# Patient Record
Sex: FEMALE | Race: WHITE | NOT HISPANIC OR LATINO | Employment: FULL TIME | URBAN - METROPOLITAN AREA
[De-identification: names, ages, dates, MRNs, and addresses within clinical notes are randomized per-mention and may not be internally consistent; named-entity substitution may affect disease eponyms.]

---

## 2019-03-22 ENCOUNTER — HOSPITAL ENCOUNTER (INPATIENT)
Facility: HOSPITAL | Age: 39
LOS: 3 days | Discharge: HOME | DRG: 176 | End: 2019-03-25
Attending: EMERGENCY MEDICINE | Admitting: INTERNAL MEDICINE
Payer: COMMERCIAL

## 2019-03-22 DIAGNOSIS — I26.99 PULMONARY EMBOLISM: ICD-10-CM

## 2019-03-22 DIAGNOSIS — R06.02 SHORTNESS OF BREATH: ICD-10-CM

## 2019-03-22 DIAGNOSIS — Z75.8 DISCHARGE PLANNING ISSUES: ICD-10-CM

## 2019-03-22 DIAGNOSIS — I26.99 OTHER ACUTE PULMONARY EMBOLISM WITHOUT ACUTE COR PULMONALE: ICD-10-CM

## 2019-03-22 DIAGNOSIS — E11.9 NEW ONSET TYPE 2 DIABETES MELLITUS: ICD-10-CM

## 2019-03-22 DIAGNOSIS — I26.09 OTHER PULMONARY EMBOLISM WITH ACUTE COR PULMONALE, UNSPECIFIED CHRONICITY: ICD-10-CM

## 2019-03-22 DIAGNOSIS — I26.09 OTHER ACUTE PULMONARY EMBOLISM WITH ACUTE COR PULMONALE: Primary | ICD-10-CM

## 2019-03-22 DIAGNOSIS — R26.81 GAIT INSTABILITY: ICD-10-CM

## 2019-03-22 LAB
ALBUMIN SERPL BCP-MCNC: 3.5 G/DL
ALP SERPL-CCNC: 68 U/L
ALT SERPL W/O P-5'-P-CCNC: 18 U/L
ANION GAP SERPL CALC-SCNC: 10 MMOL/L
APTT BLDCRRT: 21.3 SEC
APTT BLDCRRT: 22.1 SEC
AST SERPL-CCNC: 14 U/L
B-HCG UR QL: NEGATIVE
BASOPHILS # BLD AUTO: 0.06 K/UL
BASOPHILS # BLD AUTO: 0.07 K/UL
BASOPHILS NFR BLD: 0.7 %
BASOPHILS NFR BLD: 0.9 %
BILIRUB SERPL-MCNC: 0.2 MG/DL
BNP SERPL-MCNC: <10 PG/ML
BUN SERPL-MCNC: 9 MG/DL
CALCIUM SERPL-MCNC: 10.4 MG/DL
CHLORIDE SERPL-SCNC: 97 MMOL/L
CO2 SERPL-SCNC: 26 MMOL/L
CREAT SERPL-MCNC: 1.2 MG/DL
CTP QC/QA: YES
D DIMER PPP IA.FEU-MCNC: 7.06 MG/L FEU
DIFFERENTIAL METHOD: ABNORMAL
DIFFERENTIAL METHOD: ABNORMAL
EOSINOPHIL # BLD AUTO: 0.1 K/UL
EOSINOPHIL # BLD AUTO: 0.1 K/UL
EOSINOPHIL NFR BLD: 0.7 %
EOSINOPHIL NFR BLD: 1 %
ERYTHROCYTE [DISTWIDTH] IN BLOOD BY AUTOMATED COUNT: 13.2 %
ERYTHROCYTE [DISTWIDTH] IN BLOOD BY AUTOMATED COUNT: 13.2 %
ERYTHROCYTE [DISTWIDTH] IN BLOOD BY AUTOMATED COUNT: 13.3 %
EST. GFR  (AFRICAN AMERICAN): >60 ML/MIN/1.73 M^2
EST. GFR  (NON AFRICAN AMERICAN): 57.9 ML/MIN/1.73 M^2
FIBRINOGEN PPP-MCNC: 193 MG/DL
GLUCOSE SERPL-MCNC: 407 MG/DL
HCT VFR BLD AUTO: 42 %
HCT VFR BLD AUTO: 45.5 %
HCT VFR BLD AUTO: 46.7 %
HGB BLD-MCNC: 13.6 G/DL
HGB BLD-MCNC: 14.1 G/DL
HGB BLD-MCNC: 14.6 G/DL
IMM GRANULOCYTES # BLD AUTO: 0.03 K/UL
IMM GRANULOCYTES # BLD AUTO: 0.05 K/UL
IMM GRANULOCYTES NFR BLD AUTO: 0.4 %
IMM GRANULOCYTES NFR BLD AUTO: 0.5 %
INR PPP: 0.9
INR PPP: 1
INR PPP: 1.5
LYMPHOCYTES # BLD AUTO: 2.4 K/UL
LYMPHOCYTES # BLD AUTO: 4.3 K/UL
LYMPHOCYTES NFR BLD: 34.4 %
LYMPHOCYTES NFR BLD: 40.6 %
MCH RBC QN AUTO: 26.1 PG
MCH RBC QN AUTO: 26.2 PG
MCH RBC QN AUTO: 26.8 PG
MCHC RBC AUTO-ENTMCNC: 31 G/DL
MCHC RBC AUTO-ENTMCNC: 31.3 G/DL
MCHC RBC AUTO-ENTMCNC: 32.4 G/DL
MCV RBC AUTO: 83 FL
MCV RBC AUTO: 84 FL
MCV RBC AUTO: 85 FL
MONOCYTES # BLD AUTO: 0.5 K/UL
MONOCYTES # BLD AUTO: 1 K/UL
MONOCYTES NFR BLD: 6.5 %
MONOCYTES NFR BLD: 9.1 %
NEUTROPHILS # BLD AUTO: 4 K/UL
NEUTROPHILS # BLD AUTO: 5.1 K/UL
NEUTROPHILS NFR BLD: 48.1 %
NEUTROPHILS NFR BLD: 57.1 %
NRBC BLD-RTO: 0 /100 WBC
NRBC BLD-RTO: 0 /100 WBC
PLATELET # BLD AUTO: 222 K/UL
PLATELET # BLD AUTO: 248 K/UL
PLATELET # BLD AUTO: 261 K/UL
PMV BLD AUTO: 10.4 FL
PMV BLD AUTO: 10.4 FL
PMV BLD AUTO: 10.9 FL
POCT GLUCOSE: 310 MG/DL (ref 70–110)
POTASSIUM SERPL-SCNC: 4.7 MMOL/L
PROT SERPL-MCNC: 8.2 G/DL
PROTHROMBIN TIME: 10.2 SEC
PROTHROMBIN TIME: 15.1 SEC
PROTHROMBIN TIME: 9.6 SEC
RBC # BLD AUTO: 5.08 M/UL
RBC # BLD AUTO: 5.38 M/UL
RBC # BLD AUTO: 5.59 M/UL
SODIUM SERPL-SCNC: 133 MMOL/L
TROPONIN I SERPL DL<=0.01 NG/ML-MCNC: 0.03 NG/ML
WBC # BLD AUTO: 10.5 K/UL
WBC # BLD AUTO: 6.9 K/UL
WBC # BLD AUTO: 7.03 K/UL

## 2019-03-22 PROCEDURE — 85027 COMPLETE CBC AUTOMATED: CPT

## 2019-03-22 PROCEDURE — 93010 EKG 12-LEAD: ICD-10-PCS | Mod: ,,, | Performed by: INTERNAL MEDICINE

## 2019-03-22 PROCEDURE — 99152 PR MOD CONSCIOUS SEDATION, SAME PHYS, 5+ YRS, FIRST 15 MIN: ICD-10-PCS | Mod: ,,, | Performed by: INTERNAL MEDICINE

## 2019-03-22 PROCEDURE — 99291 CRITICAL CARE FIRST HOUR: CPT | Mod: 25

## 2019-03-22 PROCEDURE — 93005 ELECTROCARDIOGRAM TRACING: CPT

## 2019-03-22 PROCEDURE — 36014 PLACE CATHETER IN ARTERY: CPT | Mod: 59,RT,, | Performed by: INTERNAL MEDICINE

## 2019-03-22 PROCEDURE — C1751 CATH, INF, PER/CENT/MIDLINE: HCPCS | Performed by: INTERNAL MEDICINE

## 2019-03-22 PROCEDURE — 25000003 PHARM REV CODE 250: Performed by: STUDENT IN AN ORGANIZED HEALTH CARE EDUCATION/TRAINING PROGRAM

## 2019-03-22 PROCEDURE — 96374 THER/PROPH/DIAG INJ IV PUSH: CPT

## 2019-03-22 PROCEDURE — 99291 PR CRITICAL CARE, E/M 30-74 MINUTES: ICD-10-PCS | Mod: ,,, | Performed by: EMERGENCY MEDICINE

## 2019-03-22 PROCEDURE — 81025 URINE PREGNANCY TEST: CPT | Performed by: EMERGENCY MEDICINE

## 2019-03-22 PROCEDURE — C1769 GUIDE WIRE: HCPCS | Performed by: INTERNAL MEDICINE

## 2019-03-22 PROCEDURE — 93010 EKG 12-LEAD: ICD-10-PCS | Mod: 77,,, | Performed by: INTERNAL MEDICINE

## 2019-03-22 PROCEDURE — 99152 MOD SED SAME PHYS/QHP 5/>YRS: CPT | Mod: ,,, | Performed by: INTERNAL MEDICINE

## 2019-03-22 PROCEDURE — C1757 CATH, THROMBECTOMY/EMBOLECT: HCPCS | Performed by: INTERNAL MEDICINE

## 2019-03-22 PROCEDURE — 85610 PROTHROMBIN TIME: CPT

## 2019-03-22 PROCEDURE — 80053 COMPREHEN METABOLIC PANEL: CPT

## 2019-03-22 PROCEDURE — 96361 HYDRATE IV INFUSION ADD-ON: CPT

## 2019-03-22 PROCEDURE — 36014 PLACE CATHETER IN ARTERY: CPT | Mod: LT | Performed by: INTERNAL MEDICINE

## 2019-03-22 PROCEDURE — 96376 TX/PRO/DX INJ SAME DRUG ADON: CPT

## 2019-03-22 PROCEDURE — 63600175 PHARM REV CODE 636 W HCPCS: Performed by: EMERGENCY MEDICINE

## 2019-03-22 PROCEDURE — 37211 PR ATERIAL THROMBOLYTIC INFUSION, INITIAL DAY, S&I: ICD-10-PCS | Mod: LT,,, | Performed by: INTERNAL MEDICINE

## 2019-03-22 PROCEDURE — C1894 INTRO/SHEATH, NON-LASER: HCPCS | Performed by: INTERNAL MEDICINE

## 2019-03-22 PROCEDURE — 84484 ASSAY OF TROPONIN QUANT: CPT

## 2019-03-22 PROCEDURE — 93010 ELECTROCARDIOGRAM REPORT: CPT | Mod: ,,, | Performed by: INTERNAL MEDICINE

## 2019-03-22 PROCEDURE — 85025 COMPLETE CBC W/AUTO DIFF WBC: CPT

## 2019-03-22 PROCEDURE — 85730 THROMBOPLASTIN TIME PARTIAL: CPT | Mod: 91

## 2019-03-22 PROCEDURE — 96365 THER/PROPH/DIAG IV INF INIT: CPT

## 2019-03-22 PROCEDURE — 36014 PR PLACE CATH IN LT/RT PULM ART: ICD-10-PCS | Mod: 59,RT,, | Performed by: INTERNAL MEDICINE

## 2019-03-22 PROCEDURE — 63600175 PHARM REV CODE 636 W HCPCS: Performed by: INTERNAL MEDICINE

## 2019-03-22 PROCEDURE — 93010 ELECTROCARDIOGRAM REPORT: CPT | Mod: 77,,, | Performed by: INTERNAL MEDICINE

## 2019-03-22 PROCEDURE — 63600175 PHARM REV CODE 636 W HCPCS: Performed by: STUDENT IN AN ORGANIZED HEALTH CARE EDUCATION/TRAINING PROGRAM

## 2019-03-22 PROCEDURE — 99285 EMERGENCY DEPT VISIT HI MDM: CPT | Mod: 25

## 2019-03-22 PROCEDURE — 25500020 PHARM REV CODE 255: Performed by: EMERGENCY MEDICINE

## 2019-03-22 PROCEDURE — 85384 FIBRINOGEN ACTIVITY: CPT

## 2019-03-22 PROCEDURE — 37211 THROMBOLYTIC ART THERAPY: CPT | Mod: LT,,, | Performed by: INTERNAL MEDICINE

## 2019-03-22 PROCEDURE — 85610 PROTHROMBIN TIME: CPT | Mod: 91

## 2019-03-22 PROCEDURE — 20000000 HC ICU ROOM

## 2019-03-22 PROCEDURE — 99291 CRITICAL CARE FIRST HOUR: CPT | Mod: ,,, | Performed by: EMERGENCY MEDICINE

## 2019-03-22 PROCEDURE — 37211 THROMBOLYTIC ART THERAPY: CPT | Mod: 50 | Performed by: INTERNAL MEDICINE

## 2019-03-22 PROCEDURE — 85379 FIBRIN DEGRADATION QUANT: CPT

## 2019-03-22 PROCEDURE — 99152 MOD SED SAME PHYS/QHP 5/>YRS: CPT | Performed by: INTERNAL MEDICINE

## 2019-03-22 PROCEDURE — 25000003 PHARM REV CODE 250: Performed by: EMERGENCY MEDICINE

## 2019-03-22 PROCEDURE — 83880 ASSAY OF NATRIURETIC PEPTIDE: CPT

## 2019-03-22 RX ORDER — HEPARIN SODIUM,PORCINE/D5W 25000/250
18 INTRAVENOUS SOLUTION INTRAVENOUS CONTINUOUS
Status: DISCONTINUED | OUTPATIENT
Start: 2019-03-22 | End: 2019-03-22

## 2019-03-22 RX ORDER — DIPHENHYDRAMINE HYDROCHLORIDE 50 MG/ML
INJECTION INTRAMUSCULAR; INTRAVENOUS
Status: DISPENSED
Start: 2019-03-22 | End: 2019-03-23

## 2019-03-22 RX ORDER — LORAZEPAM 2 MG/ML
1 INJECTION INTRAMUSCULAR
Status: ACTIVE | OUTPATIENT
Start: 2019-03-22 | End: 2019-03-23

## 2019-03-22 RX ORDER — DIPHENHYDRAMINE HCL 50 MG
50 CAPSULE ORAL ONCE
Status: CANCELLED | OUTPATIENT
Start: 2019-03-22 | End: 2019-03-22

## 2019-03-22 RX ORDER — SODIUM CHLORIDE 0.9 % (FLUSH) 0.9 %
3 SYRINGE (ML) INJECTION
Status: DISCONTINUED | OUTPATIENT
Start: 2019-03-22 | End: 2019-03-25 | Stop reason: HOSPADM

## 2019-03-22 RX ORDER — IBUPROFEN 200 MG
24 TABLET ORAL
Status: DISCONTINUED | OUTPATIENT
Start: 2019-03-22 | End: 2019-03-23

## 2019-03-22 RX ORDER — SODIUM CHLORIDE 9 MG/ML
3 INJECTION, SOLUTION INTRAVENOUS CONTINUOUS
Status: CANCELLED | OUTPATIENT
Start: 2019-03-22 | End: 2019-03-22

## 2019-03-22 RX ORDER — SODIUM CHLORIDE 9 MG/ML
INJECTION, SOLUTION INTRAVENOUS CONTINUOUS
Status: DISCONTINUED | OUTPATIENT
Start: 2019-03-22 | End: 2019-03-23 | Stop reason: SDUPTHER

## 2019-03-22 RX ORDER — SODIUM CHLORIDE 9 MG/ML
INJECTION, SOLUTION INTRAVENOUS CONTINUOUS
Status: DISCONTINUED | OUTPATIENT
Start: 2019-03-22 | End: 2019-03-24

## 2019-03-22 RX ORDER — IBUPROFEN 200 MG
16 TABLET ORAL
Status: DISCONTINUED | OUTPATIENT
Start: 2019-03-22 | End: 2019-03-23

## 2019-03-22 RX ORDER — ASPIRIN 325 MG
325 TABLET ORAL
Status: COMPLETED | OUTPATIENT
Start: 2019-03-22 | End: 2019-03-22

## 2019-03-22 RX ORDER — FENTANYL CITRATE 50 UG/ML
INJECTION, SOLUTION INTRAMUSCULAR; INTRAVENOUS
Status: DISCONTINUED | OUTPATIENT
Start: 2019-03-22 | End: 2019-03-22 | Stop reason: HOSPADM

## 2019-03-22 RX ORDER — GLUCAGON 1 MG
1 KIT INJECTION
Status: DISCONTINUED | OUTPATIENT
Start: 2019-03-22 | End: 2019-03-23

## 2019-03-22 RX ORDER — MIDAZOLAM HYDROCHLORIDE 1 MG/ML
INJECTION, SOLUTION INTRAMUSCULAR; INTRAVENOUS
Status: DISCONTINUED | OUTPATIENT
Start: 2019-03-22 | End: 2019-03-22 | Stop reason: HOSPADM

## 2019-03-22 RX ORDER — INSULIN ASPART 100 [IU]/ML
1-10 INJECTION, SOLUTION INTRAVENOUS; SUBCUTANEOUS
Status: DISCONTINUED | OUTPATIENT
Start: 2019-03-22 | End: 2019-03-23

## 2019-03-22 RX ADMIN — ALTEPLASE 1 MG/HR: 2.2 INJECTION, POWDER, LYOPHILIZED, FOR SOLUTION INTRAVENOUS at 10:03

## 2019-03-22 RX ADMIN — BIVALIRUDIN 0.25 MG/KG/HR: 250 INJECTION, POWDER, LYOPHILIZED, FOR SOLUTION INTRAVENOUS at 10:03

## 2019-03-22 RX ADMIN — IOHEXOL 100 ML: 350 INJECTION, SOLUTION INTRAVENOUS at 06:03

## 2019-03-22 RX ADMIN — SODIUM CHLORIDE 1000 ML: 0.9 INJECTION, SOLUTION INTRAVENOUS at 10:03

## 2019-03-22 RX ADMIN — HEPARIN SODIUM AND DEXTROSE 18 UNITS/KG/HR: 10000; 5 INJECTION INTRAVENOUS at 07:03

## 2019-03-22 RX ADMIN — SODIUM CHLORIDE 1000 ML: 0.9 INJECTION, SOLUTION INTRAVENOUS at 06:03

## 2019-03-22 RX ADMIN — ASPIRIN 325 MG ORAL TABLET 325 MG: 325 PILL ORAL at 06:03

## 2019-03-22 NOTE — Clinical Note
The catheter is inserted to the  distal LPA. is inserted in to the Cath Ekosonic 5.4fr [id number] was secured in the LPA..

## 2019-03-22 NOTE — ED NOTES
LOC: The patient is awake, alert, and oriented to place, time, situation. Affect is appropriate.  Speech is appropriate and clear.     APPEARANCE: Patient resting comfortably in no acute distress.  Patient is clean and well groomed.    SKIN: The skin is warm and dry; color consistent with ethnicity.  Patient has normal skin turgor and moist mucus membranes.  Skin intact; no breakdown or bruising noted.     MUSCULOSKELETAL: Patient moving upper and lower extremities without difficulty.  Denies weakness.     RESPIRATORY: Airway is open and patent. Respirations spontaneous, labored.  Patient has an increased effort and rate.  No accessory muscle use noted. Denies cough.     CARDIAC:  ST,   No peripheral edema noted.  Complaints of chest tightness.     ABDOMEN: Soft and non tender to palpation.  No distention noted.     NEUROLOGIC: Eyes open spontaneously.  Behavior appropriate to situation.  Follows commands; facial expression symmetrical.  Purposeful motor response noted; normal sensation in all extremities.

## 2019-03-22 NOTE — Clinical Note
The catheter is inserted to the  distal RPA. is inserted in to the Cath Ekosonic 5.4fr [id number] was secured in the RPA. .

## 2019-03-22 NOTE — ED NOTES
Patient placed on monitor 0951 and is sinus tach at 114 bpm. Pulse Ox at 95% on room air. Patient complains of being short of breath.

## 2019-03-23 PROBLEM — E11.9 NEW ONSET TYPE 2 DIABETES MELLITUS: Status: ACTIVE | Noted: 2019-03-23

## 2019-03-23 PROBLEM — I26.09 PULMONARY EMBOLISM WITH ACUTE COR PULMONALE: Status: ACTIVE | Noted: 2019-03-22

## 2019-03-23 PROBLEM — R73.9 HYPERGLYCEMIA: Status: ACTIVE | Noted: 2019-03-23

## 2019-03-23 PROBLEM — H57.11 EYE PAIN, RIGHT: Status: ACTIVE | Noted: 2019-03-23

## 2019-03-23 LAB
ANION GAP SERPL CALC-SCNC: 10 MMOL/L
ASCENDING AORTA: 3.35 CM
BSA FOR ECHO PROCEDURE: 2.47 M2
BUN SERPL-MCNC: 8 MG/DL
CALCIUM SERPL-MCNC: 9 MG/DL
CHLORIDE SERPL-SCNC: 103 MMOL/L
CO2 SERPL-SCNC: 21 MMOL/L
CREAT SERPL-MCNC: 1 MG/DL
CV ECHO LV RWT: 0.34 CM
DOP CALC LVOT AREA: 3.11 CM2
DOP CALC LVOT DIAMETER: 1.99 CM
DOP CALC LVOT PEAK VEL: 0.96 M/S
DOP CALC LVOT STROKE VOLUME: 49.83 CM3
DOP CALCLVOT PEAK VEL VTI: 16.03 CM
E WAVE DECELERATION TIME: 194.38 MSEC
E/A RATIO: 1.14
E/E' RATIO: 5.82
ECHO LV POSTERIOR WALL: 0.77 CM (ref 0.6–1.1)
ERYTHROCYTE [DISTWIDTH] IN BLOOD BY AUTOMATED COUNT: 13.3 %
ERYTHROCYTE [DISTWIDTH] IN BLOOD BY AUTOMATED COUNT: 13.3 %
ERYTHROCYTE [DISTWIDTH] IN BLOOD BY AUTOMATED COUNT: 13.4 %
EST. GFR  (AFRICAN AMERICAN): >60 ML/MIN/1.73 M^2
EST. GFR  (NON AFRICAN AMERICAN): >60 ML/MIN/1.73 M^2
ESTIMATED AVG GLUCOSE: 338 MG/DL
FIBRINOGEN PPP-MCNC: 242 MG/DL
FIBRINOGEN PPP-MCNC: 284 MG/DL
FIBRINOGEN PPP-MCNC: 376 MG/DL
FRACTIONAL SHORTENING: 30 % (ref 28–44)
GLUCOSE SERPL-MCNC: 398 MG/DL
HBA1C MFR BLD HPLC: 13.4 %
HCT VFR BLD AUTO: 41.5 %
HCT VFR BLD AUTO: 42.3 %
HCT VFR BLD AUTO: 43.1 %
HGB BLD-MCNC: 13.1 G/DL
HGB BLD-MCNC: 13.2 G/DL
HGB BLD-MCNC: 13.3 G/DL
INR PPP: 1
INR PPP: 1.2
INR PPP: 1.2
INTERVENTRICULAR SEPTUM: 0.78 CM (ref 0.6–1.1)
IVRT: 0.12 MSEC
LA MAJOR: 5.82 CM
LA MINOR: 6.04 CM
LA WIDTH: 3.74 CM
LEFT ATRIUM SIZE: 3.34 CM
LEFT ATRIUM VOLUME INDEX: 26.6 ML/M2
LEFT ATRIUM VOLUME: 62.94 CM3
LEFT INTERNAL DIMENSION IN SYSTOLE: 3.17 CM (ref 2.1–4)
LEFT VENTRICLE DIASTOLIC VOLUME INDEX: 39.79 ML/M2
LEFT VENTRICLE DIASTOLIC VOLUME: 94.25 ML
LEFT VENTRICLE MASS INDEX: 46.7 G/M2
LEFT VENTRICLE SYSTOLIC VOLUME INDEX: 16.9 ML/M2
LEFT VENTRICLE SYSTOLIC VOLUME: 40.03 ML
LEFT VENTRICULAR INTERNAL DIMENSION IN DIASTOLE: 4.54 CM (ref 3.5–6)
LEFT VENTRICULAR MASS: 110.67 G
LV LATERAL E/E' RATIO: 5.82
LV SEPTAL E/E' RATIO: 5.82
MCH RBC QN AUTO: 26.3 PG
MCH RBC QN AUTO: 26.5 PG
MCH RBC QN AUTO: 26.7 PG
MCHC RBC AUTO-ENTMCNC: 30.6 G/DL
MCHC RBC AUTO-ENTMCNC: 31 G/DL
MCHC RBC AUTO-ENTMCNC: 32 G/DL
MCV RBC AUTO: 83 FL
MCV RBC AUTO: 86 FL
MCV RBC AUTO: 86 FL
MV PEAK A VEL: 0.56 M/S
MV PEAK E VEL: 0.64 M/S
PISA TR MAX VEL: 3.21 M/S
PLATELET # BLD AUTO: 202 K/UL
PLATELET # BLD AUTO: 218 K/UL
PLATELET # BLD AUTO: 225 K/UL
PMV BLD AUTO: 10.2 FL
PMV BLD AUTO: 10.4 FL
PMV BLD AUTO: 10.6 FL
POCT GLUCOSE: 261 MG/DL (ref 70–110)
POCT GLUCOSE: 275 MG/DL (ref 70–110)
POCT GLUCOSE: 306 MG/DL (ref 70–110)
POCT GLUCOSE: 321 MG/DL (ref 70–110)
POCT GLUCOSE: 329 MG/DL (ref 70–110)
POTASSIUM SERPL-SCNC: 3.9 MMOL/L
PROTHROMBIN TIME: 10.3 SEC
PROTHROMBIN TIME: 11.8 SEC
PROTHROMBIN TIME: 12.3 SEC
RA MAJOR: 5.08 CM
RA WIDTH: 3.27 CM
RBC # BLD AUTO: 4.95 M/UL
RBC # BLD AUTO: 4.99 M/UL
RBC # BLD AUTO: 5.01 M/UL
SINUS: 3.4 CM
SODIUM SERPL-SCNC: 134 MMOL/L
STJ: 3.17 CM
TDI LATERAL: 0.11
TDI SEPTAL: 0.11
TDI: 0.11
TR MAX PG: 41.22 MMHG
WBC # BLD AUTO: 5.26 K/UL
WBC # BLD AUTO: 5.76 K/UL
WBC # BLD AUTO: 6.65 K/UL

## 2019-03-23 PROCEDURE — 85027 COMPLETE CBC AUTOMATED: CPT | Mod: 91

## 2019-03-23 PROCEDURE — 85384 FIBRINOGEN ACTIVITY: CPT | Mod: 91

## 2019-03-23 PROCEDURE — 20000000 HC ICU ROOM

## 2019-03-23 PROCEDURE — 63600175 PHARM REV CODE 636 W HCPCS: Performed by: STUDENT IN AN ORGANIZED HEALTH CARE EDUCATION/TRAINING PROGRAM

## 2019-03-23 PROCEDURE — 85384 FIBRINOGEN ACTIVITY: CPT

## 2019-03-23 PROCEDURE — 63600175 PHARM REV CODE 636 W HCPCS

## 2019-03-23 PROCEDURE — 99223 1ST HOSP IP/OBS HIGH 75: CPT | Mod: ,,, | Performed by: NURSE PRACTITIONER

## 2019-03-23 PROCEDURE — 63600175 PHARM REV CODE 636 W HCPCS: Performed by: EMERGENCY MEDICINE

## 2019-03-23 PROCEDURE — 63600175 PHARM REV CODE 636 W HCPCS: Performed by: NURSE PRACTITIONER

## 2019-03-23 PROCEDURE — 85610 PROTHROMBIN TIME: CPT | Mod: 91

## 2019-03-23 PROCEDURE — 83036 HEMOGLOBIN GLYCOSYLATED A1C: CPT

## 2019-03-23 PROCEDURE — 80048 BASIC METABOLIC PNL TOTAL CA: CPT

## 2019-03-23 PROCEDURE — S5571 INSULIN DISPOS PEN 3 ML: HCPCS | Performed by: NURSE PRACTITIONER

## 2019-03-23 PROCEDURE — 99223 PR INITIAL HOSPITAL CARE,LEVL III: ICD-10-PCS | Mod: ,,, | Performed by: NURSE PRACTITIONER

## 2019-03-23 PROCEDURE — 25000003 PHARM REV CODE 250: Performed by: STUDENT IN AN ORGANIZED HEALTH CARE EDUCATION/TRAINING PROGRAM

## 2019-03-23 PROCEDURE — 99233 PR SUBSEQUENT HOSPITAL CARE,LEVL III: ICD-10-PCS | Mod: ,,, | Performed by: INTERNAL MEDICINE

## 2019-03-23 PROCEDURE — 85027 COMPLETE CBC AUTOMATED: CPT

## 2019-03-23 PROCEDURE — 99233 SBSQ HOSP IP/OBS HIGH 50: CPT | Mod: ,,, | Performed by: INTERNAL MEDICINE

## 2019-03-23 PROCEDURE — 85610 PROTHROMBIN TIME: CPT

## 2019-03-23 PROCEDURE — 94761 N-INVAS EAR/PLS OXIMETRY MLT: CPT

## 2019-03-23 RX ORDER — GLUCAGON 1 MG
1 KIT INJECTION
Status: DISCONTINUED | OUTPATIENT
Start: 2019-03-23 | End: 2019-03-25 | Stop reason: HOSPADM

## 2019-03-23 RX ORDER — IBUPROFEN 200 MG
16 TABLET ORAL
Status: DISCONTINUED | OUTPATIENT
Start: 2019-03-23 | End: 2019-03-25 | Stop reason: HOSPADM

## 2019-03-23 RX ORDER — LORAZEPAM 2 MG/ML
0.5 CONCENTRATE ORAL ONCE
Status: DISCONTINUED | OUTPATIENT
Start: 2019-03-23 | End: 2019-03-23

## 2019-03-23 RX ORDER — SODIUM CHLORIDE 9 MG/ML
INJECTION, SOLUTION INTRAVENOUS CONTINUOUS
Status: DISCONTINUED | OUTPATIENT
Start: 2019-03-23 | End: 2019-03-24

## 2019-03-23 RX ORDER — INSULIN ASPART 100 [IU]/ML
0-5 INJECTION, SOLUTION INTRAVENOUS; SUBCUTANEOUS
Status: DISCONTINUED | OUTPATIENT
Start: 2019-03-23 | End: 2019-03-25 | Stop reason: HOSPADM

## 2019-03-23 RX ORDER — INSULIN ASPART 100 [IU]/ML
5 INJECTION, SOLUTION INTRAVENOUS; SUBCUTANEOUS
Status: DISCONTINUED | OUTPATIENT
Start: 2019-03-23 | End: 2019-03-24

## 2019-03-23 RX ORDER — IBUPROFEN 200 MG
24 TABLET ORAL
Status: DISCONTINUED | OUTPATIENT
Start: 2019-03-23 | End: 2019-03-25 | Stop reason: HOSPADM

## 2019-03-23 RX ORDER — MORPHINE SULFATE 2 MG/ML
1 INJECTION, SOLUTION INTRAMUSCULAR; INTRAVENOUS ONCE
Status: COMPLETED | OUTPATIENT
Start: 2019-03-23 | End: 2019-03-23

## 2019-03-23 RX ORDER — LORAZEPAM 2 MG/ML
0.5 INJECTION INTRAMUSCULAR ONCE
Status: COMPLETED | OUTPATIENT
Start: 2019-03-23 | End: 2019-03-23

## 2019-03-23 RX ORDER — LORAZEPAM 2 MG/ML
INJECTION INTRAMUSCULAR
Status: COMPLETED
Start: 2019-03-23 | End: 2019-03-23

## 2019-03-23 RX ORDER — MORPHINE SULFATE 2 MG/ML
INJECTION, SOLUTION INTRAMUSCULAR; INTRAVENOUS
Status: COMPLETED
Start: 2019-03-23 | End: 2019-03-23

## 2019-03-23 RX ADMIN — APIXABAN 10 MG: 5 TABLET, FILM COATED ORAL at 02:03

## 2019-03-23 RX ADMIN — INSULIN ASPART 5 UNITS: 100 INJECTION, SOLUTION INTRAVENOUS; SUBCUTANEOUS at 05:03

## 2019-03-23 RX ADMIN — INSULIN ASPART 4 UNITS: 100 INJECTION, SOLUTION INTRAVENOUS; SUBCUTANEOUS at 01:03

## 2019-03-23 RX ADMIN — LORAZEPAM 0.5 MG: 2 INJECTION INTRAMUSCULAR at 12:03

## 2019-03-23 RX ADMIN — MORPHINE SULFATE 1 MG: 2 INJECTION, SOLUTION INTRAMUSCULAR; INTRAVENOUS at 12:03

## 2019-03-23 RX ADMIN — INSULIN ASPART 3 UNITS: 100 INJECTION, SOLUTION INTRAVENOUS; SUBCUTANEOUS at 05:03

## 2019-03-23 RX ADMIN — INSULIN ASPART 3 UNITS: 100 INJECTION, SOLUTION INTRAVENOUS; SUBCUTANEOUS at 11:03

## 2019-03-23 RX ADMIN — ALTEPLASE 1 MG/HR: 2.2 INJECTION, POWDER, LYOPHILIZED, FOR SOLUTION INTRAVENOUS at 05:03

## 2019-03-23 RX ADMIN — INSULIN ASPART 5 UNITS: 100 INJECTION, SOLUTION INTRAVENOUS; SUBCUTANEOUS at 11:03

## 2019-03-23 RX ADMIN — BIVALIRUDIN 0.25 MG/KG/HR: 250 INJECTION, POWDER, LYOPHILIZED, FOR SOLUTION INTRAVENOUS at 05:03

## 2019-03-23 RX ADMIN — APIXABAN 10 MG: 5 TABLET, FILM COATED ORAL at 10:03

## 2019-03-23 RX ADMIN — INSULIN ASPART 8 UNITS: 100 INJECTION, SOLUTION INTRAVENOUS; SUBCUTANEOUS at 08:03

## 2019-03-23 RX ADMIN — LORAZEPAM 0.5 MG: 2 INJECTION INTRAMUSCULAR; INTRAVENOUS at 12:03

## 2019-03-23 RX ADMIN — INSULIN DETEMIR 15 UNITS: 100 INJECTION, SOLUTION SUBCUTANEOUS at 09:03

## 2019-03-23 RX ADMIN — INSULIN ASPART 2 UNITS: 100 INJECTION, SOLUTION INTRAVENOUS; SUBCUTANEOUS at 10:03

## 2019-03-23 NOTE — ASSESSMENT & PLAN NOTE
-pt complaining of swelling/blurring of right eye, pupils symmetric and reactive to light  -non contrast head CT scan negative for acute findings  -PRN refresh eye drops applied to right eye

## 2019-03-23 NOTE — HPI
37 yo F with PMHx significant for morbid obesity and menorrhagia who takes hormone therapy presenting with a week duration of progressively worsening SOB/MONZON and chest pressure/tightness. 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 When she was walking from the bus to the door of the classroom she was out of breathe. She denies having symptoms of dizziness, syncopal events, vision changes, headaches, N/V, CP or palpitations. She does report having right leg pain and swelling that has worsened over the past few days. In the ER, she was found to be tachycardic and having labored breathing. EKG showed sinus tachycardia. Labs notable for elevated D-dimer and troponin. CTA chest was done that revealed diffuse bilateral pulmonary thromboembolism involving the distal right and left main pulmonary arteries, extending to multiple lobar, segmental, and subsegmental pulmonary arteries bilaterally. TTE performed by ER Cardiology fellow showed RV strain. The cath lab was activated and she went for bilateral U/S guided catheter directed thrombolysis with continuous infusion of bivalrudin and alteplase. Of note, she was fond to have RA pressure of ~18 and PAP ~80 mmHg. When she arrived to the CCU post procedure, she complained of right eye pain/pressure and blurry vision which was new. Her pupils were found to be symmetric and reactive to light, no gross motor deficit was appreciated on neurological exam. A non-contrast head CT scan was ordered for further evaluation.

## 2019-03-23 NOTE — ASSESSMENT & PLAN NOTE
BG > 400. HGBA1C :13.4. Patient without prior hx of DM.  - Endocrinology consulted, reccs appreciated  - Start Insulin detemir 15U, Insulin aspart 5U  - Low dose sliding scale

## 2019-03-23 NOTE — H&P
[facility]  Cardiology  History and Physical     Patient Name: Jose Hi  MRN: [id number]  Admission Date: 3/22/2019  Code Status: Full Code   Attending Provider: Sonido Linda MD     Patient information was obtained from patient and ER records.     Subjective:     Chief Complaint:  Shortness of Breath     HPI: 39 yo F with PMHx significant for morbid obesity and menorrhagia who takes hormone therapy presenting with a week duration of progressively worsening SOB/MONZON and chest pressure/tightness. 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 When she was walking from the bus to the door of the classroom she was out of breathe. She denies having symptoms of dizziness, syncopal events, vision changes, headaches, N/V, CP or palpitations. She does report having right leg pain and swelling that has worsened over the past few days. In the ER, she was found to be tachycardic and having labored breathing. EKG showed sinus tachycardia. Labs notable for elevated D-dimer and troponin. CTA chest was done that revealed diffuse bilateral pulmonary thromboembolism involving the distal right and left main pulmonary arteries, extending to multiple lobar, segmental, and subsegmental pulmonary arteries bilaterally. TTE performed by ER Cardiology fellow showed RV strain. The cath lab was activated and she went for bilateral U/S guided catheter directed thrombolysis with continuous infusion of bivalrudin and alteplase. Of note, she was fond to have RA pressure of ~18 and PAP ~80 mmHg. When she arrived to the CCU post procedure, she complained of right eye pain/pressure and blurry vision which was new. Her pupils were found to be symmetric and reactive to light, no gross motor deficit was appreciated on neurological exam. A non-contrast head CT scan was ordered for further evaluation.     History reviewed. No pertinent past medical history.    Past Surgical History:   Procedure  Laterality Date    FRACTURE SURGERY      TUBAL LIGATION         Review of patient's allergies indicates:  No Known Allergies    No current facility-administered medications on file prior to encounter.      No current outpatient medications on file prior to encounter.     Family History     None        Tobacco Use    Smoking status: Never Smoker    Smokeless tobacco: Never Used   Substance and Sexual Activity    Alcohol use: Yes     Comment: socially    Drug use: No    Sexual activity: Not on file     Review of Systems   Constitution: Negative for chills, fever, weakness and malaise/fatigue.   Eyes: Positive for blurred vision.   Cardiovascular: Positive for dyspnea on exertion and leg swelling. Negative for chest pain, near-syncope, orthopnea, palpitations, paroxysmal nocturnal dyspnea and syncope.   Respiratory: Positive for shortness of breath. Negative for cough and wheezing.    Gastrointestinal: Negative for abdominal pain, nausea and vomiting.   Genitourinary: Positive for menorrhagia.   Neurological: Negative for dizziness, focal weakness, headaches, light-headedness, loss of balance and numbness.     Objective:     Vital Signs (Most Recent):  Temp: 98.4 °F (36.9 °C) (03/22/19 1810)  Pulse: (!) 116 (03/22/19 1923)  Resp: (!) 22 (03/22/19 1915)  BP: 129/78 (03/22/19 1916)  SpO2: 99 % (03/22/19 1916) Vital Signs (24h Range):  Temp:  [97.3 °F (36.3 °C)-98.4 °F (36.9 °C)] 98.4 °F (36.9 °C)  Pulse:  [] 116  Resp:  [18-22] 22  SpO2:  [98 %-99 %] 99 %  BP: (129-156)/(78-96) 129/78     Weight: 120.7 kg (266 lb)  Body mass index is 39.28 kg/m².    SpO2: 99 %  O2 Device (Oxygen Therapy): room air      Intake/Output Summary (Last 24 hours) at 3/22/2019 2324  Last data filed at 3/22/2019 1926  Gross per 24 hour   Intake 1000 ml   Output --   Net 1000 ml       Lines/Drains/Airways     Peripheral Intravenous Line                 Peripheral IV - Single Lumen 03/22/19 1512 Right Antecubital less than 1 day          Peripheral IV - Single Lumen 03/22/19 1915 Left Antecubital less than 1 day                Physical Exam   Constitutional: She is oriented to person, place, and time.   HENT:   Mouth/Throat: Oropharynx is clear and moist.   Eyes: Pupils are equal, round, and reactive to light.   Neck: Neck supple.   Cardiovascular: Exam reveals gallop. Exam reveals no friction rub.   No murmur heard.  Tachycardic   Pulmonary/Chest: Effort normal and breath sounds normal.   Abdominal: Soft.   Musculoskeletal: She exhibits tenderness.   Positive for right calf tenderness    Neurological: She is alert and oriented to person, place, and time.   Skin: Skin is warm and dry.   Psychiatric: She has a normal mood and affect. Her behavior is normal.   Vitals reviewed.      Significant Labs: All pertinent lab results from the last 24 hours have been reviewed.        Assessment and Plan:     Pulmonary embolus with RV strain     -unclear etiology, suspect LE DVT and hypercoagulable state from being on hormone therapy for menorrhagia causing provoked VTE  -pt denies prior personal history and family history of PE/DVT's and rhematological/malignancy related disorders, pt denies recent surgery and recent periods of immobilization   -c/w CDT via EKOS catheters, alteplase infusing at 1 mg/hr and bilvarudin at 0.25 mg/kg/hr for 12 hours (primary team should STOP drips at 10 AM on 3/23/19 and notify Interventional Cardiology at that time)   -check fibrinogen and CBC Q6 hrs  -wean supplemental oxygen as tolerated  -TTE and Venous Doppler U/S of LE's pending   -prior to hospital discharge patient will need to be started on NOAC      Hyperglycemia    -likely stress response from acute illness  -pending HbA1C  -BMP reviewed, no AG  -FS qAC/HS with CATARINO as needed      Eye pain, right    -pt complaining of swelling/blurring of right eye, pupils symmetric and reactive to light  -non contrast head CT scan negative for acute findings  -PRN refresh eye drops  applied to right eye       Octavia Larson MD  Cardiology Fellow, PGY-4   [facility]

## 2019-03-23 NOTE — HOSPITAL COURSE
Admitted to cardiology CCU for Acute pulmonary thromboembolism with RV strain. She is S/p catheter directed thrombolytic therapy. Patient was subsequently started on Eliquis. Echo with normal left ventricular systolic function. EF of 65%. Mild right ventricular enlargement. Mildly reduced right ventricular systolic function. Labs significant for HGBA1C of 13.4, no prior history of diabetes. Endocrinology was consulted, patient started on Insulin inpatient. Patient seen today sat 98% on room air and 97% after 6 minutes walk. Echo repeated. She is hemodynamically stable. Endocrinology team agrees for discharge with outpatient follow up. DM education provided. Will discharge with home medications and outpatient follow ups.

## 2019-03-23 NOTE — PROGRESS NOTES
[facility]  Cardiology  Progress Note    Patient Name: Jose Hi  MRN: [id number]  Admission Date: 3/22/2019  Hospital Length of Stay: 1 days  Code Status: Full Code   Attending Physician: Sonido Linda MD   Primary Care Physician: Primary Doctor No  Expected Discharge Date:   Principal Problem:Pulmonary embolism with acute cor pulmonale    Subjective:     Hospital Course:   Admitted to cardiology CCU for Acute pulmonary thromboembolism with RV strain. She is S/p catheter directed thrombolytic therapy. Patient was subsequently started on Eliquis. Echo with normal left ventricular systolic function. EF of 65%. Mild right ventricular enlargement. Mildly reduced right ventricular systolic function. Labs significant for HGBA1C of 13.4, no prior history of diabetes. Endocrinology was consulted, patient started on Insulin.    Interval History:     Review of Systems   Constitution: Negative for chills, fever, weakness and malaise/fatigue.   Eyes: Negative for blurred vision.   Cardiovascular: Positive for dyspnea on exertion and leg swelling. Negative for chest pain, near-syncope, orthopnea, palpitations, paroxysmal nocturnal dyspnea and syncope.   Respiratory: Positive for shortness of breath. Negative for cough and wheezing.    Gastrointestinal: Negative for abdominal pain, nausea and vomiting.   Genitourinary: Positive for menorrhagia.   Neurological: Negative for dizziness, focal weakness, headaches, light-headedness, loss of balance and numbness.     Objective:     Vital Signs (Most Recent):  Temp: 99.2 °F (37.3 °C) (03/23/19 1100)  Pulse: 99 (03/23/19 1500)  Resp: 20 (03/23/19 1500)  BP: 117/73 (03/23/19 1500)  SpO2: 100 % (03/23/19 1500) Vital Signs (24h Range):  Temp:  [98.4 °F (36.9 °C)-99.2 °F (37.3 °C)] 99.2 °F (37.3 °C)  Pulse:  [] 99  Resp:  [17-36] 20  SpO2:  [93 %-100 %] 100 %  BP: (105-135)/(56-91) 117/73     Weight: 125.2 kg (276 lb)  Body mass index is 40.76 kg/m².      SpO2: 100 %  O2 Device (Oxygen Therapy): nasal cannula      Intake/Output Summary (Last 24 hours) at 3/23/2019 1533  Last data filed at 3/23/2019 1000  Gross per 24 hour   Intake 1737.5 ml   Output 450 ml   Net 1287.5 ml       Lines/Drains/Airways     Drain            Female External Urinary Catheter 03/23/19 0730 less than 1 day          Peripheral Intravenous Line                 Peripheral IV - Single Lumen 03/22/19 1512 Right Antecubital 1 day         Peripheral IV - Single Lumen 03/22/19 1915 Left Antecubital less than 1 day                Physical Exam   Constitutional: She is oriented to person, place, and time.   HENT:   Mouth/Throat: Oropharynx is clear and moist.   Eyes: Pupils are equal, round, and reactive to light.   Neck: Neck supple.   Cardiovascular: Exam reveals no gallop and no friction rub.   No murmur heard.  Tachycardic   Pulmonary/Chest: Effort normal and breath sounds normal.   Abdominal: Soft.   Musculoskeletal: She exhibits tenderness.   Positive for right calf tenderness    Neurological: She is alert and oriented to person, place, and time.   Skin: Skin is warm and dry.   Psychiatric: She has a normal mood and affect. Her behavior is normal.   Vitals reviewed.      Significant Labs:   CMP   Recent Labs   Lab 03/22/19  1513 03/23/19  0533   * 134*   K 4.7 3.9   CL 97 103   CO2 26 21*   * 398*   BUN 9 8   CREATININE 1.2 1.0   CALCIUM 10.4 9.0   PROT 8.2  --    ALBUMIN 3.5  --    BILITOT 0.2  --    ALKPHOS 68  --    AST 14  --    ALT 18  --    ANIONGAP 10 10   ESTGFRAFRICA >60.0 >60.0   EGFRNONAA 57.9* >60.0   , CBC   Recent Labs   Lab 03/23/19  0057 03/23/19  0533 03/23/19  1246   WBC 6.65 5.76 5.26   HGB 13.3 13.2 13.1   HCT 41.5 43.1 42.3    218 202    and Troponin   Recent Labs   Lab 03/22/19  1513   TROPONINI 0.030*       Significant Imaging: X-Ray: CXR: X-Ray Chest 1 View (CXR): No results found for this visit on 03/22/19.    Assessment and Plan:       * Pulmonary  embolism with acute cor pulmonale    -Unclear etiology, suspect LE DVT and hypercoagulable state from being on hormone therapy for menorrhagia causing provoked VTE  -pt denies prior personal history and family history of PE/DVT's and rhematological/malignancy related disorders, pt denies recent surgery and recent periods of immobilization   - S/P Catheter directed thrombolytic therapy -check fibrinogen and CBC Q6 hrs  -wean supplemental oxygen as tolerated  -TTE : Echo with normal left ventricular systolic function. EF of 65%. Mild right ventricular enlargement. Mildly reduced right ventricular systolic function   - U/S lower extremities  pending  - Started on Eliquis 10 mg BID X 7 days, the  5mg BID       New onset type 2 diabetes mellitus    BG > 400. HGBA1C :13.4. Patient without prior hx of DM.  - Endocrinology consulted, reccs appreciated  - Start Insulin detemir 15U, Insulin aspart 5U  - Low dose sliding scale     Eye pain, right    -pt complaining of swelling/blurring of right eye, pupils symmetric and reactive to light  -non contrast head CT scan negative for acute findings  -PRN refresh eye drops applied to right eye  - Resolved         VTE Risk Mitigation (From admission, onward)        Ordered     apixaban tablet 10 mg  2 times daily      03/23/19 1325     Reason for No Pharmacological VTE Prophylaxis  Once      03/22/19 2042     IP VTE HIGH RISK PATIENT  Once      03/22/19 2042          Marta Phelan DO  Cardiology  [facility]

## 2019-03-23 NOTE — ASSESSMENT & PLAN NOTE
-unclear etiology, suspect LE DVT and hypercoagulable state from being on hormone therapy for menorrhagia causing provoked VTE  -pt denies prior personal history and family history of PE/DVT's and rhematological/malignancy related disorders, pt denies recent surgery and recent periods of immobilization   -c/w CDT via EKOS catheters, alteplase infusing at 1 mg/hr and bilvarudin at 0.25 mg/kg/hr for 12 hours (primary team should STOP drips at 10 AM on 3/23/19 and notify Interventional Cardiology at that time)   -check fibrinogen and CBC Q6 hrs  -wean supplemental oxygen as tolerated  -TTE and Venous Doppler U/S of LE's pending   -prior to hospital discharge patient will need to be started on NOAC

## 2019-03-23 NOTE — SUBJECTIVE & OBJECTIVE
History reviewed. No pertinent past medical history.    Past Surgical History:   Procedure Laterality Date    FRACTURE SURGERY      TUBAL LIGATION         Review of patient's allergies indicates:  No Known Allergies    No current facility-administered medications on file prior to encounter.      No current outpatient medications on file prior to encounter.     Family History     None        Tobacco Use    Smoking status: Never Smoker    Smokeless tobacco: Never Used   Substance and Sexual Activity    Alcohol use: Yes     Comment: socially    Drug use: No    Sexual activity: Not on file     Review of Systems   Constitution: Negative for chills, fever, weakness and malaise/fatigue.   Eyes: Positive for blurred vision.   Cardiovascular: Positive for dyspnea on exertion and leg swelling. Negative for chest pain, near-syncope, orthopnea, palpitations, paroxysmal nocturnal dyspnea and syncope.   Respiratory: Positive for shortness of breath. Negative for cough and wheezing.    Gastrointestinal: Negative for abdominal pain, nausea and vomiting.   Genitourinary: Positive for menorrhagia.   Neurological: Negative for dizziness, focal weakness, headaches, light-headedness, loss of balance and numbness.     Objective:     Vital Signs (Most Recent):  Temp: 98.4 °F (36.9 °C) (03/22/19 1810)  Pulse: (!) 116 (03/22/19 1923)  Resp: (!) 22 (03/22/19 1915)  BP: 129/78 (03/22/19 1916)  SpO2: 99 % (03/22/19 1916) Vital Signs (24h Range):  Temp:  [97.3 °F (36.3 °C)-98.4 °F (36.9 °C)] 98.4 °F (36.9 °C)  Pulse:  [] 116  Resp:  [18-22] 22  SpO2:  [98 %-99 %] 99 %  BP: (129-156)/(78-96) 129/78     Weight: 120.7 kg (266 lb)  Body mass index is 39.28 kg/m².    SpO2: 99 %  O2 Device (Oxygen Therapy): room air      Intake/Output Summary (Last 24 hours) at 3/22/2019 2324  Last data filed at 3/22/2019 1926  Gross per 24 hour   Intake 1000 ml   Output --   Net 1000 ml       Lines/Drains/Airways     Peripheral Intravenous Line                  Peripheral IV - Single Lumen 03/22/19 1512 Right Antecubital less than 1 day         Peripheral IV - Single Lumen 03/22/19 1915 Left Antecubital less than 1 day                Physical Exam   Constitutional: She is oriented to person, place, and time.   HENT:   Mouth/Throat: Oropharynx is clear and moist.   Eyes: Pupils are equal, round, and reactive to light.   Neck: Neck supple.   Cardiovascular: Exam reveals gallop. Exam reveals no friction rub.   No murmur heard.  Tachycardic   Pulmonary/Chest: Effort normal and breath sounds normal.   Abdominal: Soft.   Musculoskeletal: She exhibits tenderness.   Positive for right calf tenderness    Neurological: She is alert and oriented to person, place, and time.   Skin: Skin is warm and dry.   Psychiatric: She has a normal mood and affect. Her behavior is normal.   Vitals reviewed.      Significant Labs: All pertinent lab results from the last 24 hours have been reviewed.

## 2019-03-23 NOTE — ED NOTES
Pt began with sudden severe SOB, CP minutes after return from CT.  She began screaming and clutching her chest.   called and pt moved emergently to ED room 13.  Resp paged and CN notified.

## 2019-03-23 NOTE — SUBJECTIVE & OBJECTIVE
Interval HPI:   Overnight events:     Patient is unaware of her new diagnosis of DM. Education given at bedside regarding DM basics and etiology. Continuing POC and futures expectations during admission discussed with patient. Patient verbalized understanding, was accepting, and agreed to participate in POC after consult interview. Diabetes educational materials provided to patient.    Eatin%  Nausea: No  Hypoglycemia and intervention: No  Fever: No  TPN and/or TF: No  If yes, type of TF/TPN and rate: None    PMH, PSH, FH, SH reviewed     Review of Systems  Constitutional: Positive for weight changes.  Eyes: Positive for visual disturbance.  Respiratory: Negative for cough.   Cardiovascular: Positive for chest pain.  Gastrointestinal: Negative for nausea.  Endocrine: Positive for polyuria, polydipsia.  Musculoskeletal: Negative for back pain.  Skin: Negative for rash.  Neurological: Negative for syncope. Positive for dizziness  Psychiatric/Behavioral: Negative for depression.    Current Medications and/or Treatments Impacting Glycemic Control  Immunotherapy:    Immunosuppressants     None        Steroids:   Hormones (From admission, onward)    None        Pressors:    Autonomic Drugs (From admission, onward)    None        Hyperglycemia/Diabetes Medications:   Antihyperglycemics (From admission, onward)    Start     Stop Route Frequency Ordered    19 1130  insulin aspart U-100 pen 5 Units      -- SubQ 3 times daily with meals 19 0833    19 0945  insulin detemir U-100 pen 15 Units      -- SubQ Daily 19 0833    19 0933  insulin aspart U-100 pen 0-5 Units      -- SubQ Before meals & nightly PRN 19 0833             PHYSICAL EXAMINATION:  Vitals:    19 0825   BP:    Pulse: 93   Resp: (!) 25   Temp:      Body mass index is 40.86 kg/m².    Physical Exam   Constitutional: Well developed, well nourished, NAD.  ENT: External ears no masses with nose patent; normal  hearing.  Neck: Supple; trachea midline.  Cardiovascular: Normal heart sounds, no LE edema. DP +2 bilaterally.  Lungs: Normal effort; lungs anterior bilaterally clear to auscultation.  Abdomen: Soft, obese, no masses, no hernias.  MS: No clubbing or cyanosis of nails noted; unable to assess gait.  Skin: No rashes, lesions, or ulcers; no nodules.   Psychiatric: Good judgement and insight; normal mood and affect.  Neurological: Cranial nerves are grossly intact. Normal sensation in the bilateral lower extremities.  Foot: Nails in good condition, no amputations noted.

## 2019-03-23 NOTE — CONSULTS
[facility]  Interventional Cardiology  Consult Note    Patient Name: Jose Hi  MRN: [id number]  Admission Date: 3/22/2019  Hospital Length of Stay: 1 days  Code Status: Full Code   Attending Provider: Sonido Linda MD  Consulting Provider: Loco Jung MD  Primary Care Physician: Primary Doctor No  Principal Problem:<principal problem not specified>    Patient information was obtained from patient and past medical records.     Consults  Subjective:     Chief Complaint:  SOB     HPI:  38 year old female with no past medical history presenting with dyspnea for last 1 week.Patient with no past medical history, no recent surgery or long distance travel. She works 2 jobs and denies being sedentary. Workup in ED revealed B/l PE (Diffuse bilateral pulmonary thromboembolism involving the distal right and left main pulmonary arteries, extending to multiple lobar, segmental, and subsegmental pulmonary arteries bilaterally)and Bedside echo showed preserved LVEF, RV enlargement, D septum-very challenging windows due to body habitus. She reports right leg swelling.She reports heavy menstrual periods but no other bleeding issue.No h/o CVA. Mother with history of lupus.Reports taking birth control pills for heavy menstrual cycles. Denies smoking. Elevated d-dimer, elevated troponin.BNP normal    History reviewed. No pertinent past medical history.    Past Surgical History:   Procedure Laterality Date    FRACTURE SURGERY      TUBAL LIGATION         Review of patient's allergies indicates:  No Known Allergies    No medications prior to admission.     Family History     None        Tobacco Use    Smoking status: Never Smoker    Smokeless tobacco: Never Used   Substance and Sexual Activity    Alcohol use: Yes     Comment: socially    Drug use: No    Sexual activity: Not on file     Review of Systems   All other systems reviewed and are negative.    Objective:     Vital Signs (Most  Recent):  Temp: 98.4 °F (36.9 °C) (03/22/19 1810)  Pulse: (!) 116 (03/22/19 1923)  Resp: (!) 22 (03/22/19 1915)  BP: 129/78 (03/22/19 1916)  SpO2: 99 % (03/22/19 1916) Vital Signs (24h Range):  Temp:  [97.3 °F (36.3 °C)-98.4 °F (36.9 °C)] 98.4 °F (36.9 °C)  Pulse:  [] 116  Resp:  [18-22] 22  SpO2:  [98 %-99 %] 99 %  BP: (129-156)/(78-96) 129/78     Weight: 120.7 kg (266 lb)  Body mass index is 39.28 kg/m².    SpO2: 99 %  O2 Device (Oxygen Therapy): room air      Intake/Output Summary (Last 24 hours) at 3/23/2019 0149  Last data filed at 3/22/2019 1926  Gross per 24 hour   Intake 1000 ml   Output --   Net 1000 ml       Lines/Drains/Airways     Peripheral Intravenous Line                 Peripheral IV - Single Lumen 03/22/19 1512 Right Antecubital less than 1 day         Peripheral IV - Single Lumen 03/22/19 1915 Left Antecubital less than 1 day                Physical Exam  General: alert, awake and oriented x 3  Eyes:PERRL.   Neck:no JVD   Lungs:  clear to auscultation bilaterally   Cardiovascular: Heart: tachycardic, S1, S2 normal, no murmur, click, rub or gallop.   Chest Wall: no tenderness.   Pulses-2+ radial, 1+ DP, PT b/l  Extremities: no cyanosis or edema.   Abdomen/Rectal: Abdomen: soft, non-tender ,morbidly obese,non-distented; bowel sounds normal  Neurologic: Normal strength and tone. No focal numbness or weakness  Significant Labs:   CMP   Recent Labs   Lab 03/22/19  1513   *   K 4.7   CL 97   CO2 26   *   BUN 9   CREATININE 1.2   CALCIUM 10.4   PROT 8.2   ALBUMIN 3.5   BILITOT 0.2   ALKPHOS 68   AST 14   ALT 18   ANIONGAP 10   ESTGFRAFRICA >60.0   EGFRNONAA 57.9*   , CBC   Recent Labs   Lab 03/22/19 1908 03/22/19 2242 03/23/19  0057   WBC 10.50 6.90 6.65   HGB 14.1 13.6 13.3   HCT 45.5 42.0 41.5    222 225    and INR   Recent Labs   Lab 03/22/19 1908 03/22/19 2242 03/23/19  0057   INR 1.0 1.5* 1.2       Significant Imaging: Echocardiogram: 2D echo with color flow doppler: No  results found for this or any previous visit. and Transthoracic echo (TTE) complete (Cupid Only): No results found for this or any previous visit.    Assessment and Plan:     Pulmonary embolus    Submassive Pulmonary embolism with evidence of RV strain on echo(unable to get TAPSE due to challenging windows)  Patient taken to cath lab for catheter directed tPA  Admit to CCU service  tPA via right EKOS catheter at 1mg/hr  tPA via left EKOS catheter at 1mg/hr   Angiomax vis right CFV sheath sidearm at 0.25mcg/kg/hr  Monitor for bleeding  Check CBC and fibrinogen q6 hours         VTE Risk Mitigation (From admission, onward)        Ordered     Reason for No Pharmacological VTE Prophylaxis  Once      03/22/19 2042     IP VTE HIGH RISK PATIENT  Once      03/22/19 2042          Thank you for your consult.     Loco Jung MD  Interventional Cardiology   [facility]

## 2019-03-23 NOTE — ASSESSMENT & PLAN NOTE
-likely stress response from acute illness  -pending HbA1C  -BMP reviewed, no AG  -FS qAC/HS with CATARINO as needed

## 2019-03-23 NOTE — PROGRESS NOTES
Progress Note  Interventional Cardiology Service    Admit Date: 3/22/2019   LOS: 0 days     SUBJECTIVE:     Follow up for: <principal problem not specified>    Interval History:   Symptomatic hypoxia from submassive PE with associated moderate RV failure. Patient will be taken to the cath lab for ultra-sound accelerated thrombolysis of bilateral PE.     Scheduled Meds:   diphenhydrAMINE        lorazepam  1 mg Intravenous ED 1 Time     Continuous Infusions:   heparin (porcine) in D5W 18 Units/kg/hr (03/22/19 1911)     PRN Meds:dextrose 50%, dextrose 50%, glucagon (human recombinant), glucose, glucose, [START ON 3/23/2019] heparin (PORCINE), [START ON 3/23/2019] heparin (PORCINE), insulin aspart U-100    Review of patient's allergies indicates:  No Known Allergies    OBJECTIVE:     Vital Signs (Most Recent)  Temp: 98.4 °F (36.9 °C) (03/22/19 1810)  Pulse: (!) 116 (03/22/19 1923)  Resp: (!) 22 (03/22/19 1915)  BP: 129/78 (03/22/19 1916)  SpO2: 99 % (03/22/19 1916)    Vital Signs Range (Last 24H):  Temp:  [97.3 °F (36.3 °C)-98.4 °F (36.9 °C)]   Pulse:  []   Resp:  [18-22]   BP: (129-156)/(78-96)   SpO2:  [98 %-99 %]     I & O (Last 24H):    Intake/Output Summary (Last 24 hours) at 3/22/2019 2006  Last data filed at 3/22/2019 1926  Gross per 24 hour   Intake 1000 ml   Output --   Net 1000 ml            Physical Exam  Gen: NAD  Head/Eyes/Ears/Nose: NCAT, MMM   Neck: Soft, supple, no JVD   Lung: decreased breath sounds bilaterally, no wheezes  Heart: Normal S1/S2, regular rate and rhythm, no gallops, normal PMI  Abdomen: Soft, NT/ND, NABS, no masses, no guarding/rebounding, no HSM, no ascitis  Extremities: No LE edema bilaterally, 2+ pulses bilaterally in upper/lower extremities   Skin: Normal color and turgor. No rashes, no petechia, no ecchymoses.   Neuro: AAOx3    Labs:     Recent Labs   Lab 03/22/19  1513 03/22/19  1908   WBC 7.03 10.50   HGB 14.6 14.1   HCT 46.7 45.5    248   LYMPH 34.4  2.4 40.6   4.3   MONO 6.5  0.5 9.1  1.0   EOSINOPHIL 0.7 1.0       Recent Labs   Lab 03/22/19  1513 03/22/19  1908   APTT 21.3 22.1   INR 0.9 1.0        Recent Labs   Lab 03/22/19  1513   *   CALCIUM 10.4   ALBUMIN 3.5   PROT 8.2   *   K 4.7   CO2 26   CL 97   BUN 9   CREATININE 1.2   ALKPHOS 68   ALT 18   AST 14   BILITOT 0.2     Estimated Creatinine Clearance: 89.2 mL/min (based on SCr of 1.2 mg/dL).    Recent Labs   Lab 03/22/19  1513   BNP <10       No results for input(s): LDH in the last 168 hours.    Microbiology Results (last 7 days)     ** No results found for the last 168 hours. **            ASSESSMENT AND PLAN:     # Bilateral Submassive PE  -RCA and EKOS catheter placement via bilateral CFV  -IVF at 3 ml/hour  -The risks, benefits & alternatives of the procedure were explained to the patient.    -The risks of ultra-sound accelerated catheter direct thrombolysis with include but are not limited to:  Bleeding, infection, heart rhythm abnormalities, allergic reactions, kidney injury, stroke and death.    -The risks of moderate sedation include hypotension, respiratory depression, arrhythmias, bronchospasm, & death.    -Informed consent was obtained & the patient is agreeable to proceed with the procedure.    Rodrigo Spear MD  Interventional Cardiovascular Fellow, PGY VII  Pager: [phone]  3/22/2019 8:08 PM

## 2019-03-23 NOTE — CONSULTS
"[facility]  Endocrinology  Diabetes Consult Note    Consult Requested by: Sonido Linda MD   Reason for admit: Pulmonary embolism with acute cor pulmonale    HISTORY OF PRESENT ILLNESS:  Reason for Consult: Management of T2DM, Hyperglycemia     Surgical Procedure and Date: N/A     Diabetes diagnosis year: 2019    Home Diabetes Medications:  None    Diabetes Complications include:     N/A (New Onset)    Complicating diabetes co morbidities:   Pulmonary Embolus, Obesity      HPI:   Patient is a 38 y.o. female with a diagnosis of Diffuse bilateral pulmonary thromboembolism involving the distal right and left main pulmonary arteries, extending to multiple lobar, segmental, and subsegmental pulmonary arteries bilaterally. No h/o CVA. Mother with history of lupus. Reports taking birth control pills for heavy menstrual cycles. Denies smoking. Elevated d-dimer, elevated troponin.BNP normal.   No history of DM noted on file. Patient denies family history of DM. Patient receives primary care at [facility]. She endorses only going to the doctor for OBGYN care, and when she "feels bad." Does not keep up with wellness visits. Endocrinology consulted to manage new onset DM/hyperglycemia during admission to [facility].     Lab Results   Component Value Date    HGBA1C 13.4 (H) 2019                     Interval HPI:   Overnight events:     Patient is unaware of her new diagnosis of DM. Education given at bedside regarding DM basics and etiology. Continuing POC and futures expectations during admission discussed with patient. Patient verbalized understanding, was accepting, and agreed to participate in POC after consult interview. Diabetes educational materials provided to patient.    Eatin%  Nausea: No  Hypoglycemia and intervention: No  Fever: No  TPN and/or TF: No  If yes, type of TF/TPN and rate: None    PMH, PSH, FH, SH reviewed     Review of Systems  Constitutional: " Positive for weight changes.  Eyes: Positive for visual disturbance.  Respiratory: Negative for cough.   Cardiovascular: Positive for chest pain.  Gastrointestinal: Negative for nausea.  Endocrine: Positive for polyuria, polydipsia.  Musculoskeletal: Negative for back pain.  Skin: Negative for rash.  Neurological: Negative for syncope. Positive for dizziness  Psychiatric/Behavioral: Negative for depression.    Current Medications and/or Treatments Impacting Glycemic Control  Immunotherapy:    Immunosuppressants     None        Steroids:   Hormones (From admission, onward)    None        Pressors:    Autonomic Drugs (From admission, onward)    None        Hyperglycemia/Diabetes Medications:   Antihyperglycemics (From admission, onward)    Start     Stop Route Frequency Ordered    03/23/19 1130  insulin aspart U-100 pen 5 Units      -- SubQ 3 times daily with meals 03/23/19 0833    03/23/19 0945  insulin detemir U-100 pen 15 Units      -- SubQ Daily 03/23/19 0833    03/23/19 0933  insulin aspart U-100 pen 0-5 Units      -- SubQ Before meals & nightly PRN 03/23/19 0833             PHYSICAL EXAMINATION:  Vitals:    03/23/19 0825   BP:    Pulse: 93   Resp: (!) 25   Temp:      Body mass index is 40.86 kg/m².    Physical Exam   Constitutional: Well developed, well nourished, NAD.  ENT: External ears no masses with nose patent; normal hearing.  Neck: Supple; trachea midline.  Cardiovascular: Normal heart sounds, no LE edema. DP +2 bilaterally.  Lungs: Normal effort; lungs anterior bilaterally clear to auscultation.  Abdomen: Soft, obese, no masses, no hernias.  MS: No clubbing or cyanosis of nails noted; unable to assess gait.  Skin: No rashes, lesions, or ulcers; no nodules.   Psychiatric: Good judgement and insight; normal mood and affect.  Neurological: Cranial nerves are grossly intact. Normal sensation in the bilateral lower extremities.  Foot: Nails in good condition, no amputations noted.         Labs Reviewed and  Include   Recent Labs   Lab 03/22/19  1513 03/23/19  0533   * 398*   CALCIUM 10.4 9.0   ALBUMIN 3.5  --    PROT 8.2  --    * 134*   K 4.7 3.9   CO2 26 21*   CL 97 103   BUN 9 8   CREATININE 1.2 1.0   ALKPHOS 68  --    ALT 18  --    AST 14  --    BILITOT 0.2  --      Lab Results   Component Value Date    WBC 5.26 03/23/2019    HGB 13.1 03/23/2019    HCT 42.3 03/23/2019    MCV 86 03/23/2019     03/23/2019     No results for input(s): TSH, FREET4 in the last 168 hours.  Lab Results   Component Value Date    HGBA1C 13.4 (H) 03/23/2019       Nutritional status:   Body mass index is 40.76 kg/m².  Lab Results   Component Value Date    ALBUMIN 3.5 03/22/2019     No results found for: PREALBUMIN    Estimated Creatinine Clearance: 109.2 mL/min (based on SCr of 1 mg/dL).    Accu-Checks  Recent Labs     03/22/19  2245 03/23/19  0107 03/23/19  0800 03/23/19  1150   POCTGLUCOSE 310* 321* 329* 261*        ASSESSMENT and PLAN    * Pulmonary embolism with acute cor pulmonale    Managed per primary team  Avoid hypoglycemia         New onset type 2 diabetes mellitus    BG goal 140 - 180    Start Levemir 15 units daily (20% reduction from wt based dosing). Patient is insulin naive.  Start Novolog 5 units TIDWM (20% reduction from wt based dosing using 0.3 modifier). Patient is insulin naive.  Start Low Dose SQ Insulin Correction Scale. Patient is insulin naive.  Start ADA diet.   Start DM education    ** Please call Endocrine for any BG related issues **    Discharge Recommendations:   Offer follow-up with [facility] DM clinic   have patient keep BG logs, and continue hospital MDI.   Metformin 500mg pills:  1 pill once daily at night for 3-4 nights THEN  1 pill with breakfast, 1 pill with dinner for next 3-4 days THEN  1 pill with breakfast, 2 pills with dinner for next 3-4 days THEN  2 pills with breakfast, 2 pills with dinner                   Plan discussed with patient, family, and RN at bedside.     Eliud Wagner,  NP  Endocrinology  [facility]

## 2019-03-23 NOTE — SUBJECTIVE & OBJECTIVE
Interval History:     Review of Systems   Constitution: Negative for chills, fever, weakness and malaise/fatigue.   Eyes: Negative for blurred vision.   Cardiovascular: Positive for dyspnea on exertion and leg swelling. Negative for chest pain, near-syncope, orthopnea, palpitations, paroxysmal nocturnal dyspnea and syncope.   Respiratory: Positive for shortness of breath. Negative for cough and wheezing.    Gastrointestinal: Negative for abdominal pain, nausea and vomiting.   Genitourinary: Positive for menorrhagia.   Neurological: Negative for dizziness, focal weakness, headaches, light-headedness, loss of balance and numbness.     Objective:     Vital Signs (Most Recent):  Temp: 99.2 °F (37.3 °C) (03/23/19 1100)  Pulse: 99 (03/23/19 1500)  Resp: 20 (03/23/19 1500)  BP: 117/73 (03/23/19 1500)  SpO2: 100 % (03/23/19 1500) Vital Signs (24h Range):  Temp:  [98.4 °F (36.9 °C)-99.2 °F (37.3 °C)] 99.2 °F (37.3 °C)  Pulse:  [] 99  Resp:  [17-36] 20  SpO2:  [93 %-100 %] 100 %  BP: (105-135)/(56-91) 117/73     Weight: 125.2 kg (276 lb)  Body mass index is 40.76 kg/m².     SpO2: 100 %  O2 Device (Oxygen Therapy): nasal cannula      Intake/Output Summary (Last 24 hours) at 3/23/2019 1533  Last data filed at 3/23/2019 1000  Gross per 24 hour   Intake 1737.5 ml   Output 450 ml   Net 1287.5 ml       Lines/Drains/Airways     Drain            Female External Urinary Catheter 03/23/19 0730 less than 1 day          Peripheral Intravenous Line                 Peripheral IV - Single Lumen 03/22/19 1512 Right Antecubital 1 day         Peripheral IV - Single Lumen 03/22/19 1915 Left Antecubital less than 1 day                Physical Exam   Constitutional: She is oriented to person, place, and time.   HENT:   Mouth/Throat: Oropharynx is clear and moist.   Eyes: Pupils are equal, round, and reactive to light.   Neck: Neck supple.   Cardiovascular: Exam reveals no gallop and no friction rub.   No murmur heard.  Tachycardic    Pulmonary/Chest: Effort normal and breath sounds normal.   Abdominal: Soft.   Musculoskeletal: She exhibits tenderness.   Positive for right calf tenderness    Neurological: She is alert and oriented to person, place, and time.   Skin: Skin is warm and dry.   Psychiatric: She has a normal mood and affect. Her behavior is normal.   Vitals reviewed.      Significant Labs:   CMP   Recent Labs   Lab 03/22/19  1513 03/23/19  0533   * 134*   K 4.7 3.9   CL 97 103   CO2 26 21*   * 398*   BUN 9 8   CREATININE 1.2 1.0   CALCIUM 10.4 9.0   PROT 8.2  --    ALBUMIN 3.5  --    BILITOT 0.2  --    ALKPHOS 68  --    AST 14  --    ALT 18  --    ANIONGAP 10 10   ESTGFRAFRICA >60.0 >60.0   EGFRNONAA 57.9* >60.0   , CBC   Recent Labs   Lab 03/23/19  0057 03/23/19  0533 03/23/19  1246   WBC 6.65 5.76 5.26   HGB 13.3 13.2 13.1   HCT 41.5 43.1 42.3    218 202    and Troponin   Recent Labs   Lab 03/22/19  1513   TROPONINI 0.030*       Significant Imaging: X-Ray: CXR: X-Ray Chest 1 View (CXR): No results found for this visit on 03/22/19.

## 2019-03-23 NOTE — HPI
38 year old female with no past medical history presenting with dyspnea for last 1 week.Patient with no past medical history, no recent surgery or long distance travel. She works 2 jobs and denies being sedentary. Workup in ED revealed B/l PE (Diffuse bilateral pulmonary thromboembolism involving the distal right and left main pulmonary arteries, extending to multiple lobar, segmental, and subsegmental pulmonary arteries bilaterally)and Bedside echo showed preserved LVEF, RV enlargement, D septum-very challenging windows due to body habitus. She reports right leg swelling.She reports heavy menstrual periods but no other bleeding issue.No h/o CVA. Mother with history of lupus.Reports taking birth control pills for heavy menstrual cycles. Denies smoking. Elevated d-dimer, elevated troponin.BNP normal

## 2019-03-23 NOTE — ASSESSMENT & PLAN NOTE
-Unclear etiology, suspect LE DVT and hypercoagulable state from being on hormone therapy for menorrhagia causing provoked VTE  -pt denies prior personal history and family history of PE/DVT's and rhematological/malignancy related disorders, pt denies recent surgery and recent periods of immobilization   - S/P Catheter directed thrombolytic therapy -check fibrinogen and CBC Q6 hrs  -wean supplemental oxygen as tolerated  -TTE : Echo with normal left ventricular systolic function. EF of 65%. Mild right ventricular enlargement. Mildly reduced right ventricular systolic function   - U/S lower extremities  pending  - Started on Eliquis 10 mg BID X 7 days, the  5mg BID

## 2019-03-23 NOTE — ASSESSMENT & PLAN NOTE
Submassive Pulmonary embolism with evidence of RV strain on echo(unable to get TAPSE due to challenging windows)  Patient taken to cath lab for catheter directed tPA  Admit to CCU service  tPA via right EKOS catheter at 1mg/hr  tPA via left EKOS catheter at 1mg/hr   Angiomax vis right CFV sheath sidearm at 0.25mcg/kg/hr  Monitor for bleeding  Check CBC and fibrinogen q6 hours

## 2019-03-23 NOTE — SUBJECTIVE & OBJECTIVE
History reviewed. No pertinent past medical history.    Past Surgical History:   Procedure Laterality Date    FRACTURE SURGERY      TUBAL LIGATION         Review of patient's allergies indicates:  No Known Allergies    No medications prior to admission.     Family History     None        Tobacco Use    Smoking status: Never Smoker    Smokeless tobacco: Never Used   Substance and Sexual Activity    Alcohol use: Yes     Comment: socially    Drug use: No    Sexual activity: Not on file     Review of Systems   All other systems reviewed and are negative.    Objective:     Vital Signs (Most Recent):  Temp: 98.4 °F (36.9 °C) (03/22/19 1810)  Pulse: (!) 116 (03/22/19 1923)  Resp: (!) 22 (03/22/19 1915)  BP: 129/78 (03/22/19 1916)  SpO2: 99 % (03/22/19 1916) Vital Signs (24h Range):  Temp:  [97.3 °F (36.3 °C)-98.4 °F (36.9 °C)] 98.4 °F (36.9 °C)  Pulse:  [] 116  Resp:  [18-22] 22  SpO2:  [98 %-99 %] 99 %  BP: (129-156)/(78-96) 129/78     Weight: 120.7 kg (266 lb)  Body mass index is 39.28 kg/m².    SpO2: 99 %  O2 Device (Oxygen Therapy): room air      Intake/Output Summary (Last 24 hours) at 3/23/2019 0149  Last data filed at 3/22/2019 1926  Gross per 24 hour   Intake 1000 ml   Output --   Net 1000 ml       Lines/Drains/Airways     Peripheral Intravenous Line                 Peripheral IV - Single Lumen 03/22/19 1512 Right Antecubital less than 1 day         Peripheral IV - Single Lumen 03/22/19 1915 Left Antecubital less than 1 day                Physical Exam  General: alert, awake and oriented x 3  Eyes:PERRL.   Neck:no JVD   Lungs:  clear to auscultation bilaterally   Cardiovascular: Heart: tachycardic, S1, S2 normal, no murmur, click, rub or gallop.   Chest Wall: no tenderness.   Pulses-2+ radial, 1+ DP, PT b/l  Extremities: no cyanosis or edema.   Abdomen/Rectal: Abdomen: soft, non-tender ,morbidly obese,non-distented; bowel sounds normal  Neurologic: Normal strength and tone. No focal numbness or  weakness  Significant Labs:   CMP   Recent Labs   Lab 03/22/19  1513   *   K 4.7   CL 97   CO2 26   *   BUN 9   CREATININE 1.2   CALCIUM 10.4   PROT 8.2   ALBUMIN 3.5   BILITOT 0.2   ALKPHOS 68   AST 14   ALT 18   ANIONGAP 10   ESTGFRAFRICA >60.0   EGFRNONAA 57.9*   , CBC   Recent Labs   Lab 03/22/19  1908 03/22/19  2242 03/23/19  0057   WBC 10.50 6.90 6.65   HGB 14.1 13.6 13.3   HCT 45.5 42.0 41.5    222 225    and INR   Recent Labs   Lab 03/22/19  1908 03/22/19  2242 03/23/19  0057   INR 1.0 1.5* 1.2       Significant Imaging: Echocardiogram: 2D echo with color flow doppler: No results found for this or any previous visit. and Transthoracic echo (TTE) complete (Cupid Only): No results found for this or any previous visit.

## 2019-03-23 NOTE — ASSESSMENT & PLAN NOTE
BG goal 140 - 180    Start Levemir 15 units daily (20% reduction from wt based dosing). Patient is insulin naive.  Start Novolog 5 units TIDWM (20% reduction from wt based dosing using 0.3 modifier). Patient is insulin naive.  Start Low Dose SQ Insulin Correction Scale. Patient is insulin naive.  Start ADA diet.   Start DM education    ** Please call Endocrine for any BG related issues **    Discharge Recommendations:   Offer follow-up with [facility] DM clinic   have patient keep BG logs, and continue hospital MDI.   Metformin 500mg pills:  1 pill once daily at night for 3-4 nights THEN  1 pill with breakfast, 1 pill with dinner for next 3-4 days THEN  1 pill with breakfast, 2 pills with dinner for next 3-4 days THEN  2 pills with breakfast, 2 pills with dinner

## 2019-03-23 NOTE — HPI
"Reason for Consult: Management of T2DM, Hyperglycemia     Surgical Procedure and Date: N/A     Diabetes diagnosis year: 03/23/2019    Home Diabetes Medications:  None    Diabetes Complications include:     N/A (New Onset)    Complicating diabetes co morbidities:   Pulmonary Embolus, Obesity      HPI:   Patient is a 38 y.o. female with a diagnosis of Diffuse bilateral pulmonary thromboembolism involving the distal right and left main pulmonary arteries, extending to multiple lobar, segmental, and subsegmental pulmonary arteries bilaterally. No h/o CVA. Mother with history of lupus. Reports taking birth control pills for heavy menstrual cycles. Denies smoking. Elevated d-dimer, elevated troponin.BNP normal.   No history of DM noted on file. Patient denies family history of DM. Patient receives primary care at [facility]. She endorses only going to the doctor for OBGYN care, and when she "feels bad." Does not keep up with wellness visits. Endocrinology consulted to manage new onset DM/hyperglycemia during admission to [facility].     Lab Results   Component Value Date    HGBA1C 13.4 (H) 03/23/2019                   "

## 2019-03-23 NOTE — PROGRESS NOTES
Interventional cardiology, Dr. Spear, was contacted about coolant line clotting off and clarification of orders to turn off the tPa and angiomax at 10:00 am.     Interventional said it was okay to leave coolant line off until 10 and to turn off tPa and angiomax at 10:00 am.

## 2019-03-23 NOTE — PROGRESS NOTES
MD Jim to BS. Left and right femoral sheaths were removed. Pressure applied, hemostasis achieved. Will continue bedrest and monitor site for bleeding.     Waiting to hear from MD about starting heparin or an oral anticoag.

## 2019-03-23 NOTE — ASSESSMENT & PLAN NOTE
-pt complaining of swelling/blurring of right eye, pupils symmetric and reactive to light  -non contrast head CT scan negative for acute findings  -PRN refresh eye drops applied to right eye  - Resolved

## 2019-03-24 ENCOUNTER — TELEPHONE (OUTPATIENT)
Dept: ENDOCRINOLOGY | Facility: HOSPITAL | Age: 39
End: 2019-03-24

## 2019-03-24 DIAGNOSIS — E11.9 NEW ONSET TYPE 2 DIABETES MELLITUS: Primary | ICD-10-CM

## 2019-03-24 LAB
ANION GAP SERPL CALC-SCNC: 7 MMOL/L (ref 8–16)
BASOPHILS # BLD AUTO: 0.04 K/UL (ref 0–0.2)
BASOPHILS NFR BLD: 0.8 % (ref 0–1.9)
BUN SERPL-MCNC: 7 MG/DL (ref 6–20)
CALCIUM SERPL-MCNC: 8.9 MG/DL (ref 8.7–10.5)
CHLORIDE SERPL-SCNC: 104 MMOL/L (ref 95–110)
CO2 SERPL-SCNC: 24 MMOL/L (ref 23–29)
CREAT SERPL-MCNC: 0.9 MG/DL (ref 0.5–1.4)
DIFFERENTIAL METHOD: ABNORMAL
EOSINOPHIL # BLD AUTO: 0.1 K/UL (ref 0–0.5)
EOSINOPHIL NFR BLD: 2 % (ref 0–8)
ERYTHROCYTE [DISTWIDTH] IN BLOOD BY AUTOMATED COUNT: 13.3 % (ref 11.5–14.5)
EST. GFR  (AFRICAN AMERICAN): >60 ML/MIN/1.73 M^2
EST. GFR  (NON AFRICAN AMERICAN): >60 ML/MIN/1.73 M^2
GLUCOSE SERPL-MCNC: 286 MG/DL (ref 70–110)
HCT VFR BLD AUTO: 41.2 % (ref 37–48.5)
HGB BLD-MCNC: 13.1 G/DL (ref 12–16)
IMM GRANULOCYTES # BLD AUTO: 0.03 K/UL (ref 0–0.04)
IMM GRANULOCYTES NFR BLD AUTO: 0.6 % (ref 0–0.5)
LYMPHOCYTES # BLD AUTO: 1.5 K/UL (ref 1–4.8)
LYMPHOCYTES NFR BLD: 29.3 % (ref 18–48)
MCH RBC QN AUTO: 26.6 PG (ref 27–31)
MCHC RBC AUTO-ENTMCNC: 31.8 G/DL (ref 32–36)
MCV RBC AUTO: 84 FL (ref 82–98)
MONOCYTES # BLD AUTO: 0.4 K/UL (ref 0.3–1)
MONOCYTES NFR BLD: 8.2 % (ref 4–15)
NEUTROPHILS # BLD AUTO: 3 K/UL (ref 1.8–7.7)
NEUTROPHILS NFR BLD: 59.1 % (ref 38–73)
NRBC BLD-RTO: 0 /100 WBC
PLATELET # BLD AUTO: 198 K/UL (ref 150–350)
PMV BLD AUTO: 10.7 FL (ref 9.2–12.9)
POCT GLUCOSE: 251 MG/DL (ref 70–110)
POCT GLUCOSE: 265 MG/DL (ref 70–110)
POCT GLUCOSE: 276 MG/DL (ref 70–110)
POCT GLUCOSE: 311 MG/DL (ref 70–110)
POTASSIUM SERPL-SCNC: 4 MMOL/L (ref 3.5–5.1)
RBC # BLD AUTO: 4.92 M/UL (ref 4–5.4)
SODIUM SERPL-SCNC: 135 MMOL/L (ref 136–145)
WBC # BLD AUTO: 5.02 K/UL (ref 3.9–12.7)

## 2019-03-24 PROCEDURE — 99232 SBSQ HOSP IP/OBS MODERATE 35: CPT | Mod: ,,, | Performed by: NURSE PRACTITIONER

## 2019-03-24 PROCEDURE — 25000003 PHARM REV CODE 250: Performed by: STUDENT IN AN ORGANIZED HEALTH CARE EDUCATION/TRAINING PROGRAM

## 2019-03-24 PROCEDURE — 20600001 HC STEP DOWN PRIVATE ROOM

## 2019-03-24 PROCEDURE — 99233 SBSQ HOSP IP/OBS HIGH 50: CPT | Mod: ,,, | Performed by: INTERNAL MEDICINE

## 2019-03-24 PROCEDURE — 93005 ELECTROCARDIOGRAM TRACING: CPT

## 2019-03-24 PROCEDURE — 93010 EKG 12-LEAD: ICD-10-PCS | Mod: ,,, | Performed by: INTERNAL MEDICINE

## 2019-03-24 PROCEDURE — 99233 PR SUBSEQUENT HOSPITAL CARE,LEVL III: ICD-10-PCS | Mod: ,,, | Performed by: INTERNAL MEDICINE

## 2019-03-24 PROCEDURE — 93010 ELECTROCARDIOGRAM REPORT: CPT | Mod: ,,, | Performed by: INTERNAL MEDICINE

## 2019-03-24 PROCEDURE — 99232 PR SUBSEQUENT HOSPITAL CARE,LEVL II: ICD-10-PCS | Mod: ,,, | Performed by: NURSE PRACTITIONER

## 2019-03-24 PROCEDURE — 85025 COMPLETE CBC W/AUTO DIFF WBC: CPT

## 2019-03-24 RX ORDER — INSULIN ASPART 100 [IU]/ML
10 INJECTION, SOLUTION INTRAVENOUS; SUBCUTANEOUS
Status: DISCONTINUED | OUTPATIENT
Start: 2019-03-24 | End: 2019-03-25

## 2019-03-24 RX ORDER — METFORMIN HYDROCHLORIDE 500 MG/1
TABLET ORAL
Qty: 90 TABLET | Refills: 3 | Status: SHIPPED | OUTPATIENT
Start: 2019-03-24 | End: 2019-05-10 | Stop reason: SINTOL

## 2019-03-24 RX ADMIN — INSULIN ASPART 10 UNITS: 100 INJECTION, SOLUTION INTRAVENOUS; SUBCUTANEOUS at 06:03

## 2019-03-24 RX ADMIN — INSULIN DETEMIR 15 UNITS: 100 INJECTION, SOLUTION SUBCUTANEOUS at 08:03

## 2019-03-24 RX ADMIN — INSULIN ASPART 4 UNITS: 100 INJECTION, SOLUTION INTRAVENOUS; SUBCUTANEOUS at 08:03

## 2019-03-24 RX ADMIN — APIXABAN 10 MG: 5 TABLET, FILM COATED ORAL at 08:03

## 2019-03-24 RX ADMIN — INSULIN ASPART 10 UNITS: 100 INJECTION, SOLUTION INTRAVENOUS; SUBCUTANEOUS at 12:03

## 2019-03-24 RX ADMIN — INSULIN ASPART 1 UNITS: 100 INJECTION, SOLUTION INTRAVENOUS; SUBCUTANEOUS at 09:03

## 2019-03-24 RX ADMIN — INSULIN ASPART 3 UNITS: 100 INJECTION, SOLUTION INTRAVENOUS; SUBCUTANEOUS at 06:03

## 2019-03-24 RX ADMIN — APIXABAN 10 MG: 5 TABLET, FILM COATED ORAL at 09:03

## 2019-03-24 RX ADMIN — INSULIN ASPART 5 UNITS: 100 INJECTION, SOLUTION INTRAVENOUS; SUBCUTANEOUS at 08:03

## 2019-03-24 NOTE — CONSULTS
Food & Nutrition  Education    Diet Education: Diabetic (A1C 13.4)  Time Spent: 15 minutes   Learners: Patient & 2 family members    Nutrition Education provided with handouts. All questions and concerns answered. Dietitian's contact information provided.  Comments: Educated pt on diabetic diet. Explained the importance of carbohydrate counting & consistent carbohydrate intake. Pt admits to drinking soft drinks at home & skipping meals. Reports she knows how to read a nutrition facts label. Pt seemed slightly overwhelmed with all of this information. Paperwork given.      Follow-Up: 4/4    Please re-consult as needed.    Thanks!

## 2019-03-24 NOTE — PROGRESS NOTES
03/23/19 2225 03/23/19 2234   Vital Signs   Pulse (!) 140 91   Resp (!) 43 (!) 25   SpO2 (!) 90 % 97 %   O2 Device (Oxygen Therapy) room air room air   BP --  121/73   MAP (mmHg) --  90   BP Location --  Left arm   BP Method --  Automatic   Patient Position (walking) Sitting     While walking from bathroom patient reports feeling lightheaded and difficulty breathing. After sitting on edge of bed patient reports relief of symptoms. Patient instructed not to get out of bed and to call for assistance, patient verbalized understanding. Bedside commode ordered. VSS and NADN. Will continue to monitor patient.

## 2019-03-24 NOTE — ASSESSMENT & PLAN NOTE
BG goal 140 - 180    Increase Levemir 20 units daily. 20% increase. Significant basal requirements present.  Increase Novolog 10 units TIDWM (20% increase). Insulin resistance suspected.  Continue Low Dose SQ Insulin Correction Scale.  Continuye ADA diet.     ** Please call Endocrine for any BG related issues **    Discharge Recommendations:  Offer follow-up with [facility] clinic  Have patient keep BG logs, and mail to [facility] clinic. Address provided to patient during rounds.      Will most likely RX (will need to assess morning BG 03/25/2019)  Insurance Preferred Diabetes testing supplies  Levemir 20 units daily.   32 G Cnochis Pen needles  Metformin 500mg pills:  1 pill once daily at night for 3-4 nights THEN  1 pill with breakfast, 1 pill with dinner for next 3-4 days THEN  1 pill with breakfast, 2 pills with dinner for next 3-4 days THEN  2 pills with breakfast, 2 pills with dinner      Lab Results   Component Value Date    CREATININE 0.9 03/23/2019     Lab Results   Component Value Date    ALT 18 03/22/2019    AST 14 03/22/2019    ALKPHOS 68 03/22/2019    BILITOT 0.2 03/22/2019

## 2019-03-24 NOTE — NURSING TRANSFER
Nursing Transfer Note      3/24/2019      Transfer From: [id number]    Transfer via wheelchair    Transfer with cardiac monitoring    Transported by Kimberlee RN    Medicines sent:     Chart send with patient: Yes    Notified: family members

## 2019-03-24 NOTE — ASSESSMENT & PLAN NOTE
-Unclear etiology, suspect LE DVT and hypercoagulable state from being on hormone therapy for menorrhagia causing provoked VTE  -pt denies prior personal history and family history of PE/DVT's and rhematological/malignancy related disorders, pt denies recent surgery and recent periods of immobilization   - S/P Catheter directed thrombolytic therapy   -TTE : Echo with normal left ventricular systolic function. EF of 65%. Mild right ventricular enlargement. Mildly reduced right ventricular systolic function     -  Continue Eliquis 10 mg BID X 7 days, the 5mg BID  - U/S lower extremities pending  - PT/OT ordered  - Step down to hospital medicine

## 2019-03-24 NOTE — DISCHARGE SUMMARY
[facility]  Discharge Summary     Patient ID:  Jose Hi  [id number]  38 y.o.  1980    Admit date: 3/22/2019    Discharge Date and Time:  03/24/2019 1:43 PM    Admitting Physician: Sonido Linda MD     Discharge Provider: Marta Phelan    Reason for Admission: Shortness of breath [R06.02]  Other acute pulmonary embolism without acute cor pulmonale [I26.99]  Other acute pulmonary embolism without acute cor pulmonale [I26.99]  Other acute pulmonary embolism without acute cor pulmonale [I26.99]    Admission Condition: fair    Procedures Performed: Procedure(s) (LRB):  INSERTION, CATHETER, RIGHT HEART (N/A)  EKOS, Pumoart/DVT    Hospital Course :     Patient is a 38 y.o. female with a diagnosis of Diffuse bilateral pulmonary thromboembolism involving the distal right and left main pulmonary arteries, extending to multiple lobar, segmental, and subsegmental pulmonary arteries bilaterally. Admitted to cardiology CCU for Acute pulmonary thromboembolism with RV strain. She is S/p catheter directed thrombolytic therapy. Patient was subsequently started on Eliquis. Echo with normal left ventricular systolic function. EF of 65%. Mild right ventricular enlargement. Mildly reduced right ventricular systolic function. Labs significant for HGBA1C of 13.4, no prior history of diabetes. Endocrinology was consulted, patient started on Insulin inpatient. Patient also received diabetes education. Endocrinology recommended patient be discharged with Metformin 500 mg.  With the following instructions.  - 1 pill once daily at night for 3-4 nights THEN  1 pill with breakfast, 1 pill with dinner for next 3-4 days THEN  1 pill with breakfast, 2 pills with dinner for next 3-4 days THEN  2 pills with breakfast, 2 pills with dinner       Patient to follow with [facility] endocrinology. Patient also to follow up with diabetes education 4/4.        Consults: Cardiology and Endocrinology    Significant Diagnostic  Studies:  labs:   Recent Labs   Lab 03/24/19  0851   WBC 5.02   RBC 4.92   HGB 13.1   HCT 41.2      MCV 84   MCH 26.6*   MCHC 31.8*     Basic metabolic panel    Results for FANNIE FRANKEL (MRN [id number]) as of 3/24/2019 13:54   Ref. Range 3/23/2019 23:24   Sodium Latest Ref Range: 136 - 145 mmol/L 135 (L)   Potassium Latest Ref Range: 3.5 - 5.1 mmol/L 4.0   Chloride Latest Ref Range: 95 - 110 mmol/L 104   CO2 Latest Ref Range: 23 - 29 mmol/L 24   Anion Gap Latest Ref Range: 8 - 16 mmol/L 7 (L)   BUN, Bld Latest Ref Range: 6 - 20 mg/dL 7   Creatinine Latest Ref Range: 0.5 - 1.4 mg/dL 0.9   eGFR if non African American Latest Ref Range: >60 mL/min/1.73 m^2 >60.0   eGFR if African American Latest Ref Range: >60 mL/min/1.73 m^2 >60.0   Glucose Latest Ref Range: 70 - 110 mg/dL 286 (H)   Calcium Latest Ref Range: 8.7 - 10.5 mg/dL 8.9       Final Diagnoses:    Principal Problem: Pulmonary embolism with acute cor pulmonale   Secondary Diagnoses:   Active Hospital Problems    Diagnosis  POA    *Pulmonary embolism with acute cor pulmonale [I26.09]  Yes    Eye pain, right [H57.11]  Yes    New onset type 2 diabetes mellitus [E11.9]  Yes      Resolved Hospital Problems   No resolved problems to display.       Discharged Condition: good    Discharge Exam:    Vitals:    03/24/19 1000 03/24/19 1100 03/24/19 1200 03/24/19 1300   BP: 112/64 116/66 115/65 106/73   BP Location:  Left arm     Patient Position:  Lying     Pulse: 79 80 80 96   Resp: (!) 24 (!) 23 (!) 24 (!) 30   Temp:  98.4 °F (36.9 °C)     TempSrc:  Oral     SpO2: 99% 98% 98% 98%   Weight:       Height:           Physical Exam   Constitutional: She is oriented to person, place, and time.   HENT:   Mouth/Throat: Oropharynx is clear and moist.   Eyes: Pupils are equal, round, and reactive to light.   Neck: Neck supple.   Cardiovascular: Exam reveals no gallop and no friction rub.   No murmur heard.  Tachycardic   Pulmonary/Chest: Effort normal and breath sounds  normal.   Abdominal: Soft.   Musculoskeletal: She exhibits tenderness.   Positive for right calf tenderness    Neurological: She is alert and oriented to person, place, and time.   Skin: Skin is warm and dry.   Psychiatric: She has a normal mood and affect. Her behavior is normal.   Vitals reviewed.      Disposition: Final discharge disposition not confirmed    Follow Up/Patient Instructions:     Medications:  Reconciled Home Medications:      Medication List      START taking these medications    * apixaban 5 mg Tab  Commonly known as:  ELIQUIS  Take 2 tablets (10 mg total) by mouth 2 (two) times daily. for 11 doses     * apixaban 5 mg Tab  Commonly known as:  ELIQUIS  Take 1 tablet (5 mg total) by mouth 2 (two) times daily.  Start taking on:  3/30/2019     metFORMIN 500 MG tablet  Commonly known as:  GLUCOPHAGE  1 pill once daily at night for 3-4 nights THEN  1 pill with breakfast, 1 pill with dinner for next 3-4 days THEN  1 pill with breakfast, 2 pills with dinner for next 3-4 days THEN  2 pills with breakfast, 2 pills with dinner         * This list has 2 medication(s) that are the same as other medications prescribed for you. Read the directions carefully, and ask your doctor or other care provider to review them with you.              Discharge Procedure Orders   Ambulatory Referral to Endocrinology   Referral Priority: Routine Referral Type: Consultation   Requested Specialty: Endocrinology   Number of Visits Requested: 1     Ambulatory Referral to [facility]   Referral Priority: Routine Referral Type: Consultation   Referral Reason: Specialty Services Required   Number of Visits Requested: 1     Ambulatory Referral to Cardiology   Referral Priority: Routine Referral Type: Consultation   Referral Reason: Specialty Services Required   Requested Specialty: Cardiology   Number of Visits Requested: 1       Activity: activity as tolerated  Diet: diabetic diet  Wound Care: keep wound clean and dry and none  needed    No future appointments.      Signed:  Marta Phelan  3/24/2019  1:43 PM

## 2019-03-24 NOTE — NURSING TRANSFER
Nursing Transfer Note      3/24/2019     Transfer To: [id number]    Transfer via wheelchair    Transfer with cardiac monitoring    Transported by YONAS Mohan    Medicines sent: Novolog and Levemir flexpen    Chart send with patient: Yes    Notified: friend    Patient reassessed at: 03/24/19 @ 1700    Upon arrival to floor: patient oriented to room, call bell in reach and bed in lowest position. Pt AOX4, VSS, oriented to room. YONAS Downey given report. All questions/concerns addressed.

## 2019-03-24 NOTE — PROGRESS NOTES
Progress Note  Interventional Cardiology Service    Admit Date: 3/22/2019   LOS: 2 days     SUBJECTIVE:     Follow up for: Pulmonary embolism with acute cor pulmonale    Interval History:     Patient remained clinically stable. Was safely weaned off from EKOS catheters. Reports significant improvement of SOB.     Scheduled Meds:   [START ON 3/30/2019] apixaban  5 mg Oral BID    And    apixaban  10 mg Oral BID    insulin aspart U-100  10 Units Subcutaneous TIDWM    [START ON 3/25/2019] insulin detemir U-100  20 Units Subcutaneous Daily     Continuous Infusions:  PRN Meds:dextrose 50%, dextrose 50%, glucagon (human recombinant), glucose, glucose, insulin aspart U-100, sodium chloride 0.9%    Review of patient's allergies indicates:  No Known Allergies    OBJECTIVE:     Vital Signs (Most Recent)  Temp: 97.8 °F (36.6 °C) (03/24/19 1655)  Pulse: 91 (03/24/19 1655)  Resp: (!) 30 (03/24/19 1500)  BP: 135/63 (03/24/19 1655)  SpO2: 97 % (03/24/19 1655)    Vital Signs Range (Last 24H):  Temp:  [97.8 °F (36.6 °C)-98.9 °F (37.2 °C)]   Pulse:  []   Resp:  [17-43]   BP: (103-135)/(56-76)   SpO2:  [90 %-100 %]     I & O (Last 24H):    Intake/Output Summary (Last 24 hours) at 3/24/2019 1728  Last data filed at 3/24/2019 1100  Gross per 24 hour   Intake 960 ml   Output 700 ml   Net 260 ml            Physical Exam  Gen: NAD  Head/Eyes/Ears/Nose: NCAT, MMM   Neck: Soft, supple, no JVD   Lung: decreased breath sounds bilaterally, no wheezes  Heart: Normal S1/S2, regular rate and rhythm, no gallops, normal PMI  Abdomen: Soft, NT/ND, NABS, no masses, no guarding/rebounding, no HSM, no ascitis  Extremities: No LE edema bilaterally, 2+ pulses bilaterally in upper/lower extremities   Skin: Normal color and turgor. No rashes, no petechia, no ecchymoses.   Neuro: AAOx3    Labs:     Recent Labs   Lab 03/23/19  0533 03/23/19  1246 03/24/19  0851   WBC 5.76 5.26 5.02   HGB 13.2 13.1 13.1   HCT 43.1 42.3 41.2    202 198   LYMPH   --   --  29.3  1.5   MONO  --   --  8.2  0.4   EOSINOPHIL  --   --  2.0       Recent Labs   Lab 03/22/19  1513 03/22/19  1908  03/23/19  0057 03/23/19  0533 03/23/19  1246   APTT 21.3 22.1  --   --   --   --    INR 0.9 1.0   < > 1.2 1.2 1.0    < > = values in this interval not displayed.        Recent Labs   Lab 03/22/19  1513 03/23/19  0533 03/23/19  2324   * 398* 286*   CALCIUM 10.4 9.0 8.9   ALBUMIN 3.5  --   --    PROT 8.2  --   --    * 134* 135*   K 4.7 3.9 4.0   CO2 26 21* 24   CL 97 103 104   BUN 9 8 7   CREATININE 1.2 1.0 0.9   ALKPHOS 68  --   --    ALT 18  --   --    AST 14  --   --    BILITOT 0.2  --   --      Estimated Creatinine Clearance: 121.3 mL/min (based on SCr of 0.9 mg/dL).    Recent Labs   Lab 03/22/19  1513   BNP <10       No results for input(s): LDH in the last 168 hours.    Microbiology Results (last 7 days)     ** No results found for the last 168 hours. **            ASSESSMENT AND PLAN:     # Bilateral submassive Pe  -s/p successful ultra-sound accelerated thrombolysis to bilateral PE  -follow up on 2D echo with CFD and contrast to evaluate improvement of RV function and pulmonary pressures  -Anti-phospholipid syndrome investigation for PE cause      Rodrigo Spear MD  Interventional Cardiovascular Fellow, PGY VII  Pager: [phone]  3/24/2019 5:28 PM

## 2019-03-24 NOTE — NURSING
Pt educated on DMII, including diet, using glucometer, Novolog and Detemir flexpen use. Demonstrated set up, prime, and administration of insulin, pt able to demonstrate back and administered bfast/lunch insulin. Discussed diet and POC following discharge. Will continue to educate and address all questions/concerns.

## 2019-03-24 NOTE — ASSESSMENT & PLAN NOTE
BG > 400. HGBA1C :13.4. Patient without prior hx of DM.  - Endocrinology consulted, reccs appreciated  - Increase Insulin detemir 20U, Insulin aspart 10U  - Diabetes education received  - Low dose sliding scale  - F/u endocrine for D/C reccs. May need education on home insulin.

## 2019-03-24 NOTE — SUBJECTIVE & OBJECTIVE
Interval History: Patient received diabetes education, appeared to be overwhelmed with the information.    Review of Systems   Constitution: Negative for chills, fever and malaise/fatigue.   Eyes: Negative for blurred vision.   Cardiovascular: Positive for dyspnea on exertion. Negative for chest pain, leg swelling, near-syncope, orthopnea, palpitations, paroxysmal nocturnal dyspnea and syncope.   Respiratory: Negative for cough, shortness of breath and wheezing.    Musculoskeletal:        Pain in R.calf   Gastrointestinal: Negative for abdominal pain, nausea and vomiting.   Genitourinary: Positive for menorrhagia.   Neurological: Negative for dizziness, focal weakness, headaches, light-headedness, loss of balance, numbness and weakness.     Objective:     Vital Signs (Most Recent):  Temp: 97.8 °F (36.6 °C) (03/24/19 1655)  Pulse: 91 (03/24/19 1655)  Resp: (!) 30 (03/24/19 1500)  BP: 135/63 (03/24/19 1655)  SpO2: 97 % (03/24/19 1655) Vital Signs (24h Range):  Temp:  [97.8 °F (36.6 °C)-98.9 °F (37.2 °C)] 97.8 °F (36.6 °C)  Pulse:  [] 91  Resp:  [17-43] 30  SpO2:  [90 %-100 %] 97 %  BP: (103-135)/(56-76) 135/63     Weight: 125.2 kg (276 lb)  Body mass index is 40.76 kg/m².     SpO2: 97 %  O2 Device (Oxygen Therapy): room air      Intake/Output Summary (Last 24 hours) at 3/24/2019 1704  Last data filed at 3/24/2019 1100  Gross per 24 hour   Intake 960 ml   Output 700 ml   Net 260 ml       Lines/Drains/Airways     Peripheral Intravenous Line                 Peripheral IV - Single Lumen 03/22/19 1512 Right Antecubital 2 days         Peripheral IV - Single Lumen 03/22/19 1915 Left Antecubital 1 day                Physical Exam   Constitutional: She is oriented to person, place, and time.   HENT:   Mouth/Throat: Oropharynx is clear and moist.   Eyes: Pupils are equal, round, and reactive to light.   Neck: Neck supple.   Cardiovascular: Exam reveals no gallop and no friction rub.   No murmur heard.  Tachycardic    Pulmonary/Chest: Effort normal and breath sounds normal.   Abdominal: Soft.   Musculoskeletal: She exhibits tenderness.   Positive for right calf tenderness    Neurological: She is alert and oriented to person, place, and time.   Skin: Skin is warm and dry.   Psychiatric: She has a normal mood and affect. Her behavior is normal.   Vitals reviewed.      Significant Labs:   CMP   Recent Labs   Lab 03/23/19  0533 03/23/19  2324   * 135*   K 3.9 4.0    104   CO2 21* 24   * 286*   BUN 8 7   CREATININE 1.0 0.9   CALCIUM 9.0 8.9   ANIONGAP 10 7*   ESTGFRAFRICA >60.0 >60.0   EGFRNONAA >60.0 >60.0    and CBC   Recent Labs   Lab 03/23/19  0533 03/23/19  1246 03/24/19  0851   WBC 5.76 5.26 5.02   HGB 13.2 13.1 13.1   HCT 43.1 42.3 41.2    202 198       Significant Imaging: n/a

## 2019-03-24 NOTE — PROGRESS NOTES
"[facility]  Endocrinology  Progress Note    Admit Date: 3/22/2019     Reason for Consult: Management of T2DM, Hyperglycemia     Surgical Procedure and Date: N/A     Diabetes diagnosis year: 03/23/2019    Home Diabetes Medications:  None    Diabetes Complications include:     N/A (New Onset)    Complicating diabetes co morbidities:   Pulmonary Embolus, Obesity      HPI:   Patient is a 38 y.o. female with a diagnosis of Diffuse bilateral pulmonary thromboembolism involving the distal right and left main pulmonary arteries, extending to multiple lobar, segmental, and subsegmental pulmonary arteries bilaterally. No h/o CVA. Mother with history of lupus. Reports taking birth control pills for heavy menstrual cycles. Denies smoking. Elevated d-dimer, elevated troponin.BNP normal.   No history of DM noted on file. Patient denies family history of DM. Patient receives primary care at [facility]. She endorses only going to the doctor for OBGYN care, and when she "feels bad." Does not keep up with wellness visits. Endocrinology consulted to manage new onset DM/hyperglycemia during admission to [facility] [place].     Lab Results   Component Value Date    HGBA1C 13.4 (H) 03/23/2019                     Interval HPI:   Overnight events:    NAEON. BG remains above goal. Will adjust SQ insulin regimen accordingly. Patient is scheduled for discharge tomorrow. Will most likely need insulin regimen adjustments tomorrow morning.      Reviewed topics related to DM including: the need for insulin, how insulin works, what makes it a high risk medication, the importance of immediate follow up with either PCP or endocrine provider, importance of and how to check BG, how to record BG on logs, how to administer insulin, appropriate insulin administration sites, importance of rotating injection sites, hyper/hypoglycemia, how and when to treat hypoglycemia, when to hold insulin, how the correction scale works, importance " "of storing unused insulin in the refrigerator, and when to seek medical attention.  Patient verbalized understanding, answered all questions to patient's satisfaction.      Eatin%  Nausea: No  Hypoglycemia and intervention: No  Fever: No  TPN and/or TF: No  If yes, type of TF/TPN and rate: None    /72 (BP Location: Left arm, Patient Position: Lying)   Pulse 86   Temp 98.9 °F (37.2 °C) (Oral)   Resp (!) 24   Ht 5' 9" (1.753 m)   Wt 125.2 kg (276 lb)   LMP 03/15/2019   SpO2 97%   Breastfeeding? No   BMI 40.76 kg/m²      Labs Reviewed and Include    Lab Results   Component Value Date    ALT 18 2019    AST 14 2019    ALKPHOS 68 2019    BILITOT 0.2 2019       Recent Labs   Lab 19  2324   *   CALCIUM 8.9   *   K 4.0   CO2 24      BUN 7   CREATININE 0.9     Lab Results   Component Value Date    WBC 5.26 2019    HGB 13.1 2019    HCT 42.3 2019    MCV 86 2019     2019     No results for input(s): TSH, FREET4 in the last 168 hours.  Lab Results   Component Value Date    HGBA1C 13.4 (H) 2019       Nutritional status:   Body mass index is 40.76 kg/m².  Lab Results   Component Value Date    ALBUMIN 3.5 2019     No results found for: PREALBUMIN    Estimated Creatinine Clearance: 121.3 mL/min (based on SCr of 0.9 mg/dL).    Accu-Checks  Recent Labs     19  2245 19  0107 19  0800 19  1150 19  1714 19  2201 19  0826   POCTGLUCOSE 310* 321* 329* 261* 275* 306* 311*       Current Medications and/or Treatments Impacting Glycemic Control  Immunotherapy:    Immunosuppressants     None        Steroids:   Hormones (From admission, onward)    None        Pressors:    Autonomic Drugs (From admission, onward)    None        Hyperglycemia/Diabetes Medications:   Antihyperglycemics (From admission, onward)    Start     Stop Route Frequency Ordered    19 1130  insulin aspart U-100 " pen 5 Units      -- SubQ 3 times daily with meals 03/23/19 0833    03/23/19 0945  insulin detemir U-100 pen 15 Units      -- SubQ Daily 03/23/19 0833    03/23/19 0933  insulin aspart U-100 pen 0-5 Units      -- SubQ Before meals & nightly PRN 03/23/19 0833          ASSESSMENT and PLAN    * Pulmonary embolism with acute cor pulmonale  Managed per primary team  Avoid hypoglycemia        New onset type 2 diabetes mellitus  BG goal 140 - 180    Increase Levemir 20 units daily. 20% increase. Significant basal requirements present.  Increase Novolog 10 units TIDWM (20% increase). Insulin resistance suspected.  Continue Low Dose SQ Insulin Correction Scale.  Continuye ADA diet.     ** Please call Endocrine for any BG related issues **    Discharge Recommendations:  Offer follow-up with [facility] clinic  Have patient keep BG logs, and mail to [facility] clinic. Address provided to patient during rounds.      Will most likely RX (will need to assess morning BG 03/25/2019)  Insurance Preferred Diabetes testing supplies  Levemir 20 units daily.   32 G Conchis Pen needles  Metformin 500mg pills:  1 pill once daily at night for 3-4 nights THEN  1 pill with breakfast, 1 pill with dinner for next 3-4 days THEN  1 pill with breakfast, 2 pills with dinner for next 3-4 days THEN  2 pills with breakfast, 2 pills with dinner      Lab Results   Component Value Date    CREATININE 0.9 03/23/2019     Lab Results   Component Value Date    ALT 18 03/22/2019    AST 14 03/22/2019    ALKPHOS 68 03/22/2019    BILITOT 0.2 03/22/2019                      Eliud Wagner NP  Endocrinology  [facility]

## 2019-03-24 NOTE — TELEPHONE ENCOUNTER
Patient is new onset DM, and will need to establish long term care for DM with first available provider.   Patient will also need first available DM education appointment.     Thank you,     Eliud Wagner, NP

## 2019-03-24 NOTE — PROGRESS NOTES
[facility]  Cardiology  Progress Note    Patient Name: Jose Hi  MRN: [id number]  Admission Date: 3/22/2019  Hospital Length of Stay: 2 days  Code Status: Full Code   Attending Physician: Sonido Linda MD   Primary Care Physician: Primary Doctor No  Expected Discharge Date: 3/24/2019  Principal Problem:Pulmonary embolism with acute cor pulmonale    Subjective:     Hospital Course:   Admitted to cardiology CCU for Acute pulmonary thromboembolism with RV strain. She is S/p catheter directed thrombolytic therapy. Patient was subsequently started on Eliquis. Echo with normal left ventricular systolic function. EF of 65%. Mild right ventricular enlargement. Mildly reduced right ventricular systolic function. Labs significant for HGBA1C of 13.4, no prior history of diabetes. Endocrinology was consulted, patient started on Insulin inpatient. Patient also received diabetes education. Plan to  step down to hospital medicine.        Interval History: Patient received diabetes education, appeared to be overwhelmed with the information.    Review of Systems   Constitution: Negative for chills, fever and malaise/fatigue.   Eyes: Negative for blurred vision.   Cardiovascular: Positive for dyspnea on exertion. Negative for chest pain, leg swelling, near-syncope, orthopnea, palpitations, paroxysmal nocturnal dyspnea and syncope.   Respiratory: Negative for cough, shortness of breath and wheezing.    Musculoskeletal:        Pain in R[place]cal[place]   Gastrointestinal: Negative for abdominal pain, nausea and vomiting.   Genitourinary: Positive for menorrhagia.   Neurological: Negative for dizziness, focal weakness, headaches, light-headedness, loss of balance, numbness and weakness.     Objective:     Vital Signs (Most Recent):  Temp: 97.8 °F (36.6 °C) (03/24/19 1655)  Pulse: 91 (03/24/19 1655)  Resp: (!) 30 (03/24/19 1500)  BP: 135/63 (03/24/19 1655)  SpO2: 97 % (03/24/19 1655) Vital Signs (24h Range):  Temp:   [97.8 °F (36.6 °C)-98.9 °F (37.2 °C)] 97.8 °F (36.6 °C)  Pulse:  [] 91  Resp:  [17-43] 30  SpO2:  [90 %-100 %] 97 %  BP: (103-135)/(56-76) 135/63     Weight: 125.2 kg (276 lb)  Body mass index is 40.76 kg/m².     SpO2: 97 %  O2 Device (Oxygen Therapy): room air      Intake/Output Summary (Last 24 hours) at 3/24/2019 1704  Last data filed at 3/24/2019 1100  Gross per 24 hour   Intake 960 ml   Output 700 ml   Net 260 ml       Lines/Drains/Airways     Peripheral Intravenous Line                 Peripheral IV - Single Lumen 03/22/19 1512 Right Antecubital 2 days         Peripheral IV - Single Lumen 03/22/19 1915 Left Antecubital 1 day                Physical Exam   Constitutional: She is oriented to person, place, and time.   HENT:   Mouth/Throat: Oropharynx is clear and moist.   Eyes: Pupils are equal, round, and reactive to light.   Neck: Neck supple.   Cardiovascular: Exam reveals no gallop and no friction rub.   No murmur heard.  Tachycardic   Pulmonary/Chest: Effort normal and breath sounds normal.   Abdominal: Soft.   Musculoskeletal: She exhibits tenderness.   Positive for right calf tenderness    Neurological: She is alert and oriented to person, place, and time.   Skin: Skin is warm and dry.   Psychiatric: She has a normal mood and affect. Her behavior is normal.   Vitals reviewed.      Significant Labs:   CMP   Recent Labs   Lab 03/23/19  0533 03/23/19  2324   * 135*   K 3.9 4.0    104   CO2 21* 24   * 286*   BUN 8 7   CREATININE 1.0 0.9   CALCIUM 9.0 8.9   ANIONGAP 10 7*   ESTGFRAFRICA >60.0 >60.0   EGFRNONAA >60.0 >60.0    and CBC   Recent Labs   Lab 03/23/19  0533 03/23/19  1246 03/24/19  0851   WBC 5.76 5.26 5.02   HGB 13.2 13.1 13.1   HCT 43.1 42.3 41.2    202 198       Significant Imaging: n/a    Assessment and Plan:       * Pulmonary embolism with acute cor pulmonale  -Unclear etiology, suspect LE DVT and hypercoagulable state from being on hormone therapy for menorrhagia  causing provoked VTE  -pt denies prior personal history and family history of PE/DVT's and rhematological/malignancy related disorders, pt denies recent surgery and recent periods of immobilization   - S/P Catheter directed thrombolytic therapy   -TTE : Echo with normal left ventricular systolic function. EF of 65%. Mild right ventricular enlargement. Mildly reduced right ventricular systolic function     -  Continue Eliquis 10 mg BID X 7 days, the 5mg BID  - U/S lower extremities pending  - PT/OT ordered  - Step down to hospital medicine      New onset type 2 diabetes mellitus  BG > 400. HGBA1C :13.4. Patient without prior hx of DM.  - Endocrinology consulted, reccs appreciated  - Increase Insulin detemir 20U, Insulin aspart 10U  - Diabetes education received  - Low dose sliding scale  - F/u endocrine for D/C reccs. May need education on home insulin.    Eye pain, right  -pt complaining of swelling/blurring of right eye, pupils symmetric and reactive to light  -non contrast head CT scan negative for acute findings  -PRN refresh eye drops applied to right eye  - Resolved        VTE Risk Mitigation (From admission, onward)        Ordered     apixaban tablet 5 mg  2 times daily      03/24/19 1329     apixaban tablet 10 mg  2 times daily      03/24/19 1329     Reason for No Pharmacological VTE Prophylaxis  Once      03/22/19 2042     IP VTE HIGH RISK PATIENT  Once      03/22/19 2042          Marta Phelan DO  Cardiology  [facility] [address]

## 2019-03-25 VITALS
DIASTOLIC BLOOD PRESSURE: 56 MMHG | HEIGHT: 69 IN | WEIGHT: 276 LBS | HEART RATE: 75 BPM | BODY MASS INDEX: 40.88 KG/M2 | TEMPERATURE: 98 F | OXYGEN SATURATION: 97 % | RESPIRATION RATE: 18 BRPM | SYSTOLIC BLOOD PRESSURE: 117 MMHG

## 2019-03-25 PROBLEM — Z75.8 DISCHARGE PLANNING ISSUES: Status: ACTIVE | Noted: 2019-03-25

## 2019-03-25 LAB
ANION GAP SERPL CALC-SCNC: 9 MMOL/L (ref 8–16)
ASCENDING AORTA: 2.84 CM
AV INDEX (PROSTH): 0.88
AV MEAN GRADIENT: 2.86 MMHG
AV PEAK GRADIENT: 5.38 MMHG
AV VALVE AREA: 3.36 CM2
AV VELOCITY RATIO: 0.82
BSA FOR ECHO PROCEDURE: 2.47 M2
BUN SERPL-MCNC: 8 MG/DL (ref 6–20)
CALCIUM SERPL-MCNC: 9.5 MG/DL (ref 8.7–10.5)
CHLORIDE SERPL-SCNC: 103 MMOL/L (ref 95–110)
CO2 SERPL-SCNC: 23 MMOL/L (ref 23–29)
CREAT SERPL-MCNC: 0.8 MG/DL (ref 0.5–1.4)
CV ECHO LV RWT: 0.38 CM
DOP CALC AO PEAK VEL: 1.16 M/S
DOP CALC AO VTI: 23.62 CM
DOP CALC LVOT AREA: 3.83 CM2
DOP CALC LVOT DIAMETER: 2.21 CM
DOP CALC LVOT PEAK VEL: 0.95 M/S
DOP CALC LVOT STROKE VOLUME: 79.48 CM3
DOP CALCLVOT PEAK VEL VTI: 20.73 CM
E WAVE DECELERATION TIME: 181.47 MSEC
E/A RATIO: 1.39
E/E' RATIO: 6.5
ECHO LV POSTERIOR WALL: 0.86 CM (ref 0.6–1.1)
EST. GFR  (AFRICAN AMERICAN): >60 ML/MIN/1.73 M^2
EST. GFR  (NON AFRICAN AMERICAN): >60 ML/MIN/1.73 M^2
FRACTIONAL SHORTENING: 35 % (ref 28–44)
GLUCOSE SERPL-MCNC: 291 MG/DL (ref 70–110)
INTERVENTRICULAR SEPTUM: 0.88 CM (ref 0.6–1.1)
IVRT: 0.07 MSEC
LA MAJOR: 5.51 CM
LA MINOR: 5.52 CM
LA WIDTH: 3.45 CM
LEFT ATRIUM SIZE: 3.72 CM
LEFT ATRIUM VOLUME INDEX: 25.4 ML/M2
LEFT ATRIUM VOLUME: 60.16 CM3
LEFT INTERNAL DIMENSION IN SYSTOLE: 2.94 CM (ref 2.1–4)
LEFT VENTRICLE DIASTOLIC VOLUME INDEX: 40.15 ML/M2
LEFT VENTRICLE DIASTOLIC VOLUME: 95.1 ML
LEFT VENTRICLE MASS INDEX: 54.6 G/M2
LEFT VENTRICLE SYSTOLIC VOLUME INDEX: 14 ML/M2
LEFT VENTRICLE SYSTOLIC VOLUME: 33.18 ML
LEFT VENTRICULAR INTERNAL DIMENSION IN DIASTOLE: 4.55 CM (ref 3.5–6)
LEFT VENTRICULAR MASS: 129.28 G
LV LATERAL E/E' RATIO: 5.2
LV SEPTAL E/E' RATIO: 8.67
MV PEAK A VEL: 0.56 M/S
MV PEAK E VEL: 0.78 M/S
PISA TR MAX VEL: 2.55 M/S
POCT GLUCOSE: 183 MG/DL (ref 70–110)
POCT GLUCOSE: 271 MG/DL (ref 70–110)
POCT GLUCOSE: 284 MG/DL (ref 70–110)
POTASSIUM SERPL-SCNC: 4.4 MMOL/L (ref 3.5–5.1)
PULM VEIN S/D RATIO: 1.46
PV PEAK D VEL: 0.39 M/S
PV PEAK S VEL: 0.57 M/S
RA MAJOR: 4.08 CM
RA PRESSURE: 3 MMHG
RA WIDTH: 3.44 CM
RIGHT VENTRICULAR END-DIASTOLIC DIMENSION: 3.93 CM
RV TISSUE DOPPLER FREE WALL SYSTOLIC VELOCITY 1 (APICAL 4 CHAMBER VIEW): 12.39 M/S
SINUS: 3.33 CM
SODIUM SERPL-SCNC: 135 MMOL/L (ref 136–145)
STJ: 2.59 CM
TDI LATERAL: 0.15
TDI SEPTAL: 0.09
TDI: 0.12
TR MAX PG: 26.01 MMHG
TRICUSPID ANNULAR PLANE SYSTOLIC EXCURSION: 1.6 CM
TV REST PULMONARY ARTERY PRESSURE: 29 MMHG

## 2019-03-25 PROCEDURE — 36415 COLL VENOUS BLD VENIPUNCTURE: CPT

## 2019-03-25 PROCEDURE — 93005 ELECTROCARDIOGRAM TRACING: CPT

## 2019-03-25 PROCEDURE — 93010 EKG 12-LEAD: ICD-10-PCS | Mod: ,,, | Performed by: INTERNAL MEDICINE

## 2019-03-25 PROCEDURE — 80048 BASIC METABOLIC PNL TOTAL CA: CPT

## 2019-03-25 PROCEDURE — 25000003 PHARM REV CODE 250: Performed by: STUDENT IN AN ORGANIZED HEALTH CARE EDUCATION/TRAINING PROGRAM

## 2019-03-25 PROCEDURE — 97165 OT EVAL LOW COMPLEX 30 MIN: CPT

## 2019-03-25 PROCEDURE — 99239 PR HOSPITAL DISCHARGE DAY,>30 MIN: ICD-10-PCS | Mod: ,,, | Performed by: INTERNAL MEDICINE

## 2019-03-25 PROCEDURE — 99232 SBSQ HOSP IP/OBS MODERATE 35: CPT | Mod: ,,, | Performed by: NURSE PRACTITIONER

## 2019-03-25 PROCEDURE — 99232 PR SUBSEQUENT HOSPITAL CARE,LEVL II: ICD-10-PCS | Mod: ,,, | Performed by: NURSE PRACTITIONER

## 2019-03-25 PROCEDURE — 86147 CARDIOLIPIN ANTIBODY EA IG: CPT

## 2019-03-25 PROCEDURE — 99239 HOSP IP/OBS DSCHRG MGMT >30: CPT | Mod: ,,, | Performed by: INTERNAL MEDICINE

## 2019-03-25 PROCEDURE — 86038 ANTINUCLEAR ANTIBODIES: CPT

## 2019-03-25 PROCEDURE — 93010 ELECTROCARDIOGRAM REPORT: CPT | Mod: ,,, | Performed by: INTERNAL MEDICINE

## 2019-03-25 RX ORDER — INSULIN ASPART 100 [IU]/ML
13 INJECTION, SOLUTION INTRAVENOUS; SUBCUTANEOUS
Status: DISCONTINUED | OUTPATIENT
Start: 2019-03-25 | End: 2019-03-25 | Stop reason: HOSPADM

## 2019-03-25 RX ORDER — INSULIN PUMP SYRINGE, 3 ML
EACH MISCELLANEOUS
Qty: 1 EACH | Refills: 0 | Status: SHIPPED | OUTPATIENT
Start: 2019-03-25 | End: 2020-09-23 | Stop reason: SDUPTHER

## 2019-03-25 RX ORDER — LANCETS
1 EACH MISCELLANEOUS 3 TIMES DAILY
Qty: 200 EACH | Refills: 6 | Status: SHIPPED | OUTPATIENT
Start: 2019-03-25 | End: 2020-08-12 | Stop reason: SDUPTHER

## 2019-03-25 RX ADMIN — INSULIN ASPART 13 UNITS: 100 INJECTION, SOLUTION INTRAVENOUS; SUBCUTANEOUS at 11:03

## 2019-03-25 RX ADMIN — APIXABAN 10 MG: 5 TABLET, FILM COATED ORAL at 11:03

## 2019-03-25 RX ADMIN — INSULIN ASPART 3 UNITS: 100 INJECTION, SOLUTION INTRAVENOUS; SUBCUTANEOUS at 11:03

## 2019-03-25 NOTE — PLAN OF CARE
SEVERO faxed order for rolling walker to [facility] for delivery. SEVERO contacted [facility] to inform of referral. SEVERO informed by Dontae that he has deliveries ahead of pt and can deliver it later tonight at the hospital or delivery it to pt's home tomorrow.     SEVERO informed pt of this information. Pt is in agreement for delivery at home tomorrow. Dontae is aware.     Andreea Perez, JOHNNY  [facility]

## 2019-03-25 NOTE — DISCHARGE SUMMARY
[facility]  Cardiology  Discharge Summary      Patient Name: Jose Hi  MRN: [id number]  Admission Date: 3/22/2019  Hospital Length of Stay: 3 days  Discharge Date and Time:  03/25/2019 3:02 PM  Attending Physician: Sonido Linda MD    Discharging Provider: Idania Espinoza MD  Primary Care Physician: Primary Doctor No    HPI:   39 yo F with PMHx significant for morbid obesity and menorrhagia who takes hormone therapy presenting with a week duration of progressively worsening SOB/MONZON and chest pressure/tightness. 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 When she was walking from the bus to the door of the classroom she was out of breathe. She denies having symptoms of dizziness, syncopal events, vision changes, headaches, N/V, CP or palpitations. She does report having right leg pain and swelling that has worsened over the past few days. In the ER, she was found to be tachycardic and having labored breathing. EKG showed sinus tachycardia. Labs notable for elevated D-dimer and troponin. CTA chest was done that revealed diffuse bilateral pulmonary thromboembolism involving the distal right and left main pulmonary arteries, extending to multiple lobar, segmental, and subsegmental pulmonary arteries bilaterally. TTE performed by ER Cardiology fellow showed RV strain. The cath lab was activated and she went for bilateral U/S guided catheter directed thrombolysis with continuous infusion of bivalrudin and alteplase. Of note, she was fond to have RA pressure of ~18 and PAP ~80 mmHg. When she arrived to the CCU post procedure, she complained of right eye pain/pressure and blurry vision which was new. Her pupils were found to be symmetric and reactive to light, no gross motor deficit was appreciated on neurological exam. A non-contrast head CT scan was ordered for further evaluation.     Procedure(s) (LRB):  INSERTION, CATHETER, RIGHT HEART (N/A)  EKOS, Pumoart/DVT      Indwelling Lines/Drains at time of discharge:  Lines/Drains/Airways          None          Hospital Course:  Admitted to cardiology CCU for Acute pulmonary thromboembolism with RV strain. She is S/p catheter directed thrombolytic therapy. Patient was subsequently started on Eliquis. Echo with normal left ventricular systolic function. EF of 65%. Mild right ventricular enlargement. Mildly reduced right ventricular systolic function. Labs significant for HGBA1C of 13.4, no prior history of diabetes. Endocrinology was consulted, patient started on Insulin inpatient.  Patient seen today sat 98% on room air and 97% after 6 minutes walk. Echo repeated. She is hemodynamically stable. Endocrinology team agrees for discharge with outpatient follow up. DM education provided. Will discharge with home medications and outpatient follow ups.    Review of Systems   Constitution: Negative for chills, fever and malaise/fatigue.   Eyes: Negative for blurred vision.   Cardiovascular: Positive for dyspnea on exertion. Negative for chest pain, leg swelling, near-syncope, orthopnea, palpitations, paroxysmal nocturnal dyspnea and syncope.   Respiratory: Negative for cough, shortness of breath and wheezing.    Musculoskeletal:        Pain in R.calf   Gastrointestinal: Negative for abdominal pain, nausea and vomiting.   Genitourinary: Positive for menorrhagia.   Neurological: Negative for dizziness, focal weakness, headaches, light-headedness, loss of balance, numbness and weakness.       Temp:  [97.5 °F (36.4 °C)-98.3 °F (36.8 °C)] 98.3 °F (36.8 °C)  Pulse:  [60-94] 75  Resp:  [16-20] 18  SpO2:  [94 %-98 %] 97 %  BP: (106-135)/(56-67) 117/56         Physical Exam   Constitutional: She is oriented to person, place, and time.   HENT:   Mouth/Throat: Oropharynx is clear and moist.   Eyes: Pupils are equal, round, and reactive to light.   Neck: Neck supple.   Cardiovascular: Exam reveals no gallop and no friction rub.   No murmur  heard.  Pulmonary/Chest: Effort normal and breath sounds normal.   Abdominal: Soft.   Musculoskeletal: She exhibits tenderness.   Positive for right calf tenderness    Neurological: She is alert and oriented to person, place, and time.   Skin: Skin is warm and dry.   Psychiatric: She has a normal mood and affect. Her behavior is normal.   Vitals reviewed.            Consults:   Consults (From admission, onward)        Status Ordering Provider     Inpatient consult to Cardiology  Once     Provider:  (Not yet assigned)    Completed HALLEY DIEZ     Inpatient consult to Endocrinology  Once     Provider:  (Not yet assigned)    Completed ALAINA HICKS     Inpatient consult to Registered Dietitian/Nutritionist  Once     Provider:  (Not yet assigned)    Completed RENETTA TIJERINA     Inpatient consult to Registered Dietitian/Nutritionist  Once     Provider:  (Not yet assigned)    Completed MARIA DE JESUS BARRIENTOS     Inpatient consult to Social Work  Once     Provider:  (Not yet assigned)    Acknowledged FRENCH RODRIGUES          Significant Diagnostic Studies: Labs:   BMP:   Recent Labs   Lab 03/23/19 2324 03/25/19  0823   * 291*   * 135*   K 4.0 4.4    103   CO2 24 23   BUN 7 8   CREATININE 0.9 0.8   CALCIUM 8.9 9.5   , CMP   Recent Labs   Lab 03/23/19 2324 03/25/19  0823   * 135*   K 4.0 4.4    103   CO2 24 23   * 291*   BUN 7 8   CREATININE 0.9 0.8   CALCIUM 8.9 9.5   ANIONGAP 7* 9   ESTGFRAFRICA >60.0 >60.0   EGFRNONAA >60.0 >60.0   , CBC   Recent Labs   Lab 03/24/19  0851   WBC 5.02   HGB 13.1   HCT 41.2      , INR   Lab Results   Component Value Date    INR 1.0 03/23/2019    INR 1.2 03/23/2019    INR 1.2 03/23/2019   , Lipid Panel No results found for: CHOL, HDL, LDLCALC, TRIG, CHOLHDL, Troponin   Recent Labs   Lab 03/22/19  1513   TROPONINI 0.030*   , A1C:   Recent Labs   Lab 03/23/19  0057   HGBA1C 13.4*    and All labs within the past 24 hours have been reviewed  Microbiology: Blood  Culture No results found for: LABBLOO, Sputum Culture No results found for: GSRESP, RESPIRATORYC and Urine Culture  No results found for: LABURIN  Radiology: X-Ray: CXR: X-Ray Chest 1 View (CXR): No results found for this visit on 03/22/19.    Pending Diagnostic Studies:     Procedure Component Value Units Date/Time    PARAS [[id number]] Collected:  03/25/19 0823    Order Status:  Sent Lab Status:  In process Updated:  03/25/19 0839    Specimen:  Blood     Cardiolipin antibody [[id number]] Collected:  03/25/19 0823    Order Status:  Sent Lab Status:  In process Updated:  03/25/19 0839    Specimen:  Blood           Final Active Diagnoses:    Diagnosis Date Noted POA    PRINCIPAL PROBLEM:  Pulmonary embolism with acute cor pulmonale [I26.09] 03/22/2019 Yes    Discharge planning issues [Z02.9] 03/25/2019 Not Applicable    Eye pain, right [H57.11] 03/23/2019 Yes    New onset type 2 diabetes mellitus [E11.9] 03/23/2019 Yes      Problems Resolved During this Admission:     * Pulmonary embolism with acute cor pulmonale  -Unclear etiology, suspect LE DVT and hypercoagulable state from being on hormone therapy for menorrhagia causing provoked VTE  -pt denies prior personal history and family history of PE/DVT's and rhematological/malignancy related disorders, pt denies recent surgery and recent periods of immobilization   - S/P Catheter directed thrombolytic therapy   -TTE : Echo with normal left ventricular systolic function. EF of 65%. Mild right ventricular enlargement. Mildly reduced right ventricular systolic function.    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  There is additional thrombus noted in the distal right peroneal vein.      -  Continue Eliquis 10 mg BID X 7 days, the 5mg BID  - PT/OT ordered.  - Antiphospholipid workup sent - to be followed up as an outpatient  - Discharge  home.      Discharge planning issues  Patient does not require further acute OT services.     Endocrinology Discharge Recommendations:  Offer follow-up with [facility]  Have patient keep BG logs, and mail to [facility]. Address provided to patient during rounds.      Will most likely RX:  Insurance Preferred Diabetes testing supplies  Levemir 20 units daily.   32 G Conchis Pen needles  Metformin 500mg pills:  1 pill once daily at night for 3-4 nights THEN  1 pill with breakfast, 1 pill with dinner for next 3-4 days THEN  1 pill with breakfast, 2 pills with dinner for next 3-4 days THEN  2 pills with breakfast, 2 pills with dinner           New onset type 2 diabetes mellitus  BG > 400. HGBA1C :13.4. Patient without prior hx of DM.  - Endocrinology consulted, reccs appreciated  - Increase Insulin detemir 20U, Insulin aspart 10U  - Diabetes education received  - Low dose sliding scale  - F/u endocrine for D/C reccs.     Eye pain, right  -pt complaining of swelling/blurring of right eye, pupils symmetric and reactive to light  -non contrast head CT scan negative for acute findings  -PRN refresh eye drops applied to right eye  - Resolved        Discharged Condition: stable    Disposition: Home or Self Care    Follow Up:  Follow-up Information     Schedule an appointment as soon as possible for a visit with Primary Doctor No.           Schedule an appointment as soon as possible for a visit with [facility].    Specialty:  Endocrinology  Contact information[facility]  [facility] [id number] [phone]           Schedule an appointment as soon as possible for a visit with [facility].    Specialty:  Cardiology  Contact information:  [address] [facility] [id number] [phone]               Patient Instructions:      Ambulatory Referral to Endocrinology   Referral Priority: Routine Referral Type: Consultation   Requested Specialty: Endocrinology   Number of  Visits Requested: 1     Ambulatory Referral to [facility]   Referral Priority: Routine Referral Type: Consultation   Referral Reason: Specialty Services Required   Number of Visits Requested: 1     Ambulatory Referral to Cardiology   Referral Priority: Routine Referral Type: Consultation   Referral Reason: Specialty Services Required   Requested Specialty: Cardiology   Number of Visits Requested: 1     Ambulatory referral to Cardiology   Referral Priority: Routine Referral Type: Consultation   Referral Reason: Specialty Services Required   Requested Specialty: Cardiology   Number of Visits Requested: 1     Ambulatory referral to Endocrinology   Referral Priority: Routine Referral Type: Consultation   Requested Specialty: Endocrinology   Number of Visits Requested: 1     Diet diabetic     Notify your health care provider if you experience any of the following:  temperature >100.4     Notify your health care provider if you experience any of the following:  persistent nausea and vomiting or diarrhea     Notify your health care provider if you experience any of the following:  severe uncontrolled pain     Notify your health care provider if you experience any of the following:  redness, tenderness, or signs of infection (pain, swelling, redness, odor or green/yellow discharge around incision site)     Notify your health care provider if you experience any of the following:  difficulty breathing or increased cough     Notify your health care provider if you experience any of the following:  severe persistent headache     Notify your health care provider if you experience any of the following:  worsening rash     Notify your health care provider if you experience any of the following:  persistent dizziness, light-headedness, or visual disturbances     Notify your health care provider if you experience any of the following:  increased confusion or weakness     Activity as tolerated     Medications:  Reconciled  "Home Medications:      Medication List      START taking these medications    * ELIQUIS 5 mg Tab  Generic drug:  apixaban  Take 2 tablets (10 mg total) by mouth 2 (two) times daily. for 11 doses     * ELIQUIS 5 mg Tab  Generic drug:  apixaban  Take 1 tablet (5 mg total) by mouth 2 (two) times daily.  Start taking on:  3/30/2019     LEVEMIR FLEXTOUCH U-100 INSULN 100 unit/mL (3 mL) Inpn pen  Generic drug:  insulin detemir U-100  Inject 20 Units into the skin once daily.  Start taking on:  3/26/2019     metFORMIN 500 MG tablet  Commonly known as:  GLUCOPHAGE  1 pill once daily at night for 3-4 nights THEN  1 pill with breakfast, 1 pill with dinner for next 3-4 days THEN  1 pill with breakfast, 2 pills with dinner for next 3-4 days THEN  2 pills with breakfast, 2 pills with dinner         * This list has 2 medication(s) that are the same as other medications prescribed for you. Read the directions carefully, and ask your doctor or other care provider to review them with you.            ASK your doctor about these medications    BD ULTRA-FINE LEANA PEN NEEDLE 32 gauge x 5/32" Ndle  Generic drug:  pen needle, diabetic  Use once daily            Time spent on the discharge of patient: 30 minutes    Idania Espinoza MD  Cardiology  [facility]  "

## 2019-03-25 NOTE — PT/OT/SLP EVAL
Occupational Therapy   Evaluation and Discharge Note    Name: Jose Hi  MRN: [id number]  Admitting Diagnosis:  Pulmonary embolism with acute cor pulmonale 3 Days Post-Op    Recommendations:     Discharge Recommendations: home (would benefit from OP PT for overall endurance and strengthening)   Discharge Equipment Recommendations:  walker, rolling(Tub transfer bench )  Barriers to discharge:  None    Assessment:     Jose Hi is a 38 y.o. female with a medical diagnosis of Pulmonary embolism with acute cor pulmonale. At this time, patient is functioning at their prior level of function and does not require further acute OT services.     Plan:     During this hospitalization, patient does not require further acute OT services.  Please re-consult if situation changes.    · Plan of Care Reviewed with: patient    Subjective     Chief Complaint: SOB   Patient/Family Comments/goals: to return home and return to work     Occupational Profile:  Living Environment: Pt reports she lives with her boyfriend and daughter (11y/o) in a 1st floor apartment with no BENNETT. Pt has tub/shower combo with no DME present. Pt works full time as a  and a night manager at a hotel. Pt was driving and performing all ADLs and IADLs independently.   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 Pt may have limited assistance upon returning to the home environment.     Pain/Comfort:  · Pain Rating 1: 0/10  · Pain Rating Post-Intervention 1: 0/10    Patients cultural, spiritual, Christian conflicts given the current situation: no    Objective:     Communicated with: RN prior to session.  Patient found ambulating hallway with RN with telemetry upon OT entry to room. Pt agreeable to  therapy session.     General Precautions: Standard, fall   Orthopedic Precautions:N/A   Braces: N/A     Occupational Performance:    Bed Mobility:    · Not performed, pt found ambulating hallway and left sitting EOB.     Functional Mobility/Transfers:  · Patient completed Sit <> Stand Transfer with independence  with  no assistive device   Functional Mobility: Pt engaged in functional mobility simulating household/community mobility with modified independence <> Supervision  using RW and no AD.   · Pt completed ~160ft using dynamap for stability with SpO2 at 94-97% during mobility  · OT assess pt using no AD and pt completed ~30ft with SBA using no AD; slight instability noted   · OT provided pt with RW and pt completed ~200ft with modified independence using RW; improved balance, confidence, and stability with use of RW.     Activities of Daily Living:  · Feeding:  independence    · Upper Body Dressing: set-up A for gown mangement     · Lower Body Dressing: stand by assistance for pulling up socks while sitting EOB     Cognitive/Visual Perceptual:  Cognitive/Psychosocial Skills:     -       Oriented to: Person, Place, Time and Situation   -       Follows Commands/attention:Follows multistep  commands  -       Communication: clear/fluent  -       Memory: No Deficits noted  -       Safety awareness/insight to disability: intact   -       Mood/Affect/Coping skills/emotional control: Appropriate to situation  Visual/Perceptual:      -Intact      Physical Exam:  Balance:    - Static sit: good  -  Dynamic sit: good  - Static standing: goog  - Dynamic Standing: good-    Postural examination/scapula alignment:    -       Rounded shoulders  Skin integrity: Visible skin intact  Edema:  Mild BUEs   Sensation:    -       Intact  light/touch BUEs  Dominant hand:    -       right  Upper Extremity Range of Motion:     -       Right Upper Extremity: WFL  -       Left Upper Extremity: WFL  Upper Extremity Strength:    -       Right  Upper Extremity: WFL  -       Left Upper Extremity: WFL    AMPA 6 Click ADL:  AMPAC Total Score: 22    Treatment & Education:  Pt educated by therapist on:   - Pt educated on role of OT, POC, and goals for therapy.    - Patient and family aware of patient's deficits and therapy progression.   - pt educated on DME recommendations: RW and TTB   - CM notified of recommendations  - Educated pt on being appropriate to transfer with nsg and PCT with supervision using RW  - Time provided for therapeutic counseling and discussion of health disposition.  - pt educated on energy conservation techniques to incorporate in the home and work environment   - Pt educated on controlled breathing techniques  - pt's oxygen sat remained ~94-97% throughout session.    - Importance of OOB ax's with staff member assistance and sitting OOB majority of day.   - Pt completed ADLs and functional mobility for treatment session as noted above   - Pt verbalized understanding. Pt expressed no further concerns/questions.  - whiteboard updated   Education:    Patient left sitting EOB with all lines intact, call button in reach and RN notified    GOALS:   Multidisciplinary Problems     Occupational Therapy Goals     Not on file          Multidisciplinary Problems (Resolved)        Problem: Occupational Therapy Goal    Goal Priority Disciplines Outcome Interventions   Occupational Therapy Goal   (Resolved)     OT, PT/OT Outcome(s) achieved                    History:     History reviewed. No pertinent past medical history.    Past Surgical History:   Procedure Laterality Date    EKOS, Pumoart/DVT  3/22/2019    Performed by [facility] at [facility] C[facility]    FRACTURE SURGERY      INSERTION, CATHETER, RIGHT HEART N/A 3/22/2019    Performed by [facility] at [facility] CATH LAB    TUBAL LIGATION         Time Tracking:     OT Date of Treatment: 03/25/19  OT Start Time: 1410  OT Stop Time: 1431  OT Total Time (min): 21 min    Billable  Minutes:Evaluation 21    Macy Hyatt, OT  3/25/2019

## 2019-03-25 NOTE — SUBJECTIVE & OBJECTIVE
"Interval HPI:   Overnight events:  BG remains above goal with significant prandial excursions noted. Patient scheduled for discharge today. Will make home adjustments as needed.     Eatin%  Nausea: No  Hypoglycemia and intervention: No  Fever: No  TPN and/or TF: No  If yes, type of TF/TPN and rate: None    /67 (BP Location: Right arm, Patient Position: Lying)   Pulse 94   Temp 97.8 °F (36.6 °C) (Oral)   Resp 16   Ht 5' 9" (1.753 m)   Wt 125.2 kg (276 lb)   LMP 03/15/2019   SpO2 98%   Breastfeeding? No   BMI 40.76 kg/m²     Labs Reviewed and Include    No results for input(s): GLU, CALCIUM, ALBUMIN, PROT, NA, K, CO2, CL, BUN, CREATININE, ALKPHOS, ALT, AST, BILITOT in the last 24 hours.  Lab Results   Component Value Date    WBC 5.02 2019    HGB 13.1 2019    HCT 41.2 2019    MCV 84 2019     2019     No results for input(s): TSH, FREET4 in the last 168 hours.  Lab Results   Component Value Date    HGBA1C 13.4 (H) 2019       Nutritional status:   Body mass index is 40.76 kg/m².  Lab Results   Component Value Date    ALBUMIN 3.5 2019     No results found for: PREALBUMIN    Estimated Creatinine Clearance: 121.3 mL/min (based on SCr of 0.9 mg/dL).    Accu-Checks  Recent Labs     19  2245 19  0107 19  0800 19  1150 19  1714 19  2201 19  0826 19  1230 19  1659 19  2135   POCTGLUCOSE 310* 321* 329* 261* 275* 306* 311* 265* 251* 276*       Current Medications and/or Treatments Impacting Glycemic Control  Immunotherapy:    Immunosuppressants     None        Steroids:   Hormones (From admission, onward)    None        Pressors:    Autonomic Drugs (From admission, onward)    None        Hyperglycemia/Diabetes Medications:   Antihyperglycemics (From admission, onward)    Start     Stop Route Frequency Ordered    19 0900  insulin detemir U-100 pen 20 Units      -- SubQ Daily 19 1000    " 03/24/19 1130  insulin aspart U-100 pen 10 Units      -- SubQ 3 times daily with meals 03/24/19 1000    03/23/19 0933  insulin aspart U-100 pen 0-5 Units      -- SubQ Before meals & nightly PRN 03/23/19 0801

## 2019-03-25 NOTE — PROGRESS NOTES
"[facility]  Endocrinology  Progress Note    Admit Date: 3/22/2019     Reason for Consult: Management of T2DM, Hyperglycemia     Surgical Procedure and Date: N/A     Diabetes diagnosis year: 2019    Home Diabetes Medications:  None    Diabetes Complications include:     N/A (New Onset)    Complicating diabetes co morbidities:   Pulmonary Embolus, Obesity      HPI:   Patient is a 38 y.o. female with a diagnosis of Diffuse bilateral pulmonary thromboembolism involving the distal right and left main pulmonary arteries, extending to multiple lobar, segmental, and subsegmental pulmonary arteries bilaterally. No h/o CVA. Mother with history of lupus. Reports taking birth control pills for heavy menstrual cycles. Denies smoking. Elevated d-dimer, elevated troponin.BNP normal.   No history of DM noted on file. Patient denies family history of DM. Patient receives primary care at [facility]. She endorses only going to the doctor for OBGYN care, and when she "feels bad." Does not keep up with wellness visits. Endocrinology consulted to manage new onset DM/hyperglycemia during [place] [facility]     Lab Results   Component Value Date    HGBA1C 13.4 (H) 2019                     Interval HPI:   Overnight events:  BG remains above goal with significant prandial excursions noted. Patient scheduled for discharge today. Will make home adjustments as needed.     Eatin%  Nausea: No  Hypoglycemia and intervention: No  Fever: No  TPN and/or TF: No  If yes, type of TF/TPN and rate: None    /67 (BP Location: Right arm, Patient Position: Lying)   Pulse 94   Temp 97.8 °F (36.6 °C) (Oral)   Resp 16   Ht 5' 9" (1.753 m)   Wt 125.2 kg (276 lb)   LMP 03/15/2019   SpO2 98%   Breastfeeding? No   BMI 40.76 kg/m²      Labs Reviewed and Include    No results for input(s): GLU, CALCIUM, ALBUMIN, PROT, NA, K, CO2, CL, BUN, CREATININE, ALKPHOS, ALT, AST, BILITOT in the last 24 " hours.  Lab Results   Component Value Date    WBC 5.02 03/24/2019    HGB 13.1 03/24/2019    HCT 41.2 03/24/2019    MCV 84 03/24/2019     03/24/2019     No results for input(s): TSH, FREET4 in the last 168 hours.  Lab Results   Component Value Date    HGBA1C 13.4 (H) 03/23/2019       Nutritional status:   Body mass index is 40.76 kg/m².  Lab Results   Component Value Date    ALBUMIN 3.5 03/22/2019     No results found for: PREALBUMIN    Estimated Creatinine Clearance: 121.3 mL/min (based on SCr of 0.9 mg/dL).    Accu-Checks  Recent Labs     03/22/19  2245 03/23/19  0107 03/23/19  0800 03/23/19  1150 03/23/19  1714 03/23/19  2201 03/24/19  0826 03/24/19  1230 03/24/19  1659 03/24/19  2135   POCTGLUCOSE 310* 321* 329* 261* 275* 306* 311* 265* 251* 276*       Current Medications and/or Treatments Impacting Glycemic Control  Immunotherapy:    Immunosuppressants     None        Steroids:   Hormones (From admission, onward)    None        Pressors:    Autonomic Drugs (From admission, onward)    None        Hyperglycemia/Diabetes Medications:   Antihyperglycemics (From admission, onward)    Start     Stop Route Frequency Ordered    03/25/19 0900  insulin detemir U-100 pen 20 Units      -- SubQ Daily 03/24/19 1000    03/24/19 1130  insulin aspart U-100 pen 10 Units      -- SubQ 3 times daily with meals 03/24/19 1000    03/23/19 0933  insulin aspart U-100 pen 0-5 Units      -- SubQ Before meals & nightly PRN 03/23/19 0833          ASSESSMENT and PLAN    New onset type 2 diabetes mellitus  BG goal 140 - 180    Continue Levemir 20 units daily. 20% increase. Significant basal requirements present.  Increase Novolog to 12 units TIDWM (20% increase). Insulin resistance suspected.  Continue Low Dose SQ Insulin Correction Scale.  Continuye ADA diet.     ** Please call Endocrine for any BG related issues **    Discharge Recommendations:  Offer follow-up with [facility] clinic  Have patient keep BG logs, and mail to [facility]  clinic. Address provided to patient during rounds.      Will most likely RX:  Insurance Preferred Diabetes testing supplies  Levemir 20 units daily.   32 G Conchis Pen needles  Metformin 500mg pills:  1 pill once daily at night for 3-4 nights THEN  1 pill with breakfast, 1 pill with dinner for next 3-4 days THEN  1 pill with breakfast, 2 pills with dinner for next 3-4 days THEN  2 pills with breakfast, 2 pills with dinner      Lab Results   Component Value Date    CREATININE 0.9 03/23/2019     Lab Results   Component Value Date    ALT 18 03/22/2019    AST 14 03/22/2019    ALKPHOS 68 03/22/2019    BILITOT 0.2 03/22/2019        Discharge Teaching:     Reviewed topics related to DM including: the need for insulin, how insulin works, what makes it a high risk medication, the importance of immediate follow up with either PCP or endocrine provider, importance of and how to check BG, how to record BG on logs, how to administer insulin, appropriate insulin administration sites, importance of rotating injection sites, hyper/hypoglycemia, how and when to treat hypoglycemia, when to hold insulin, importance of storing unused insulin in the refrigerator, and when to seek medical attention.  Patient verbalized understanding, answered all questions to patient's satisfaction.                 Eliud Wagner, NP  Endocrinology  [facility]

## 2019-03-25 NOTE — ASSESSMENT & PLAN NOTE
-Unclear etiology, suspect LE DVT and hypercoagulable state from being on hormone therapy for menorrhagia causing provoked VTE  -pt denies prior personal history and family history of PE/DVT's and rhematological/malignancy related disorders, pt denies recent surgery and recent periods of immobilization   - S/P Catheter directed thrombolytic therapy   -TTE : Echo with normal left ventricular systolic function. EF of 65%. Mild right ventricular enlargement. Mildly reduced right ventricular systolic function.    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  There is additional thrombus noted in the distal right peroneal vein.      -  Continue Eliquis 10 mg BID X 7 days, the 5mg BID  - PT/OT ordered.  - Antiphospholipid workup sent - to be followed up as an outpatient  - Discharge home.

## 2019-03-25 NOTE — DISCHARGE INSTRUCTIONS
Follow[place]up [place] [facility]  Keep BG logs, and mail to [facility].        Levemir 20 units daily.     32 G Conchis Pen needles    Metformin 500mg pills:  1 pill once daily at night for 3-4 nights THEN  1 pill with breakfast, 1 pill with dinner for next 3-4 days THEN  1 pill with breakfast, 2 pills with dinner for next 3-4 days THEN  2 pills with breakfast, 2 pills with dinner

## 2019-03-25 NOTE — NURSING
Patient AAo instable condition, vss, discharged home via wheelchair accompanied by family , Iv access discontinued gauze and tape , heart monitor removed, denied pain .

## 2019-03-25 NOTE — ASSESSMENT & PLAN NOTE
Patient does not require further acute OT services.     Endocrinology Discharge Recommendations:  Offer follow-up with [facility]  Have patient keep BG logs, and mail to [facility] clinic. Address provided to patient during rounds.      Will most likely RX:  Insurance Preferred Diabetes testing supplies  Levemir 20 units daily.   32 G Conchis Pen needles  Metformin 500mg pills:  1 pill once daily at night for 3-4 nights THEN  1 pill with breakfast, 1 pill with dinner for next 3-4 days THEN  1 pill with breakfast, 2 pills with dinner for next 3-4 days THEN  2 pills with breakfast, 2 pills with dinner

## 2019-03-25 NOTE — ASSESSMENT & PLAN NOTE
BG > 400. HGBA1C :13.4. Patient without prior hx of DM.  - Endocrinology consulted, reccs appreciated  - Increase Insulin detemir 20U, Insulin aspart 10U  - Diabetes education received  - Low dose sliding scale  - F/u endocrine for D/C reccs.

## 2019-03-25 NOTE — ASSESSMENT & PLAN NOTE
BG goal 140 - 180    Continue Levemir 20 units daily. 20% increase. Significant basal requirements present.  Increase Novolog to 12 units TIDWM (20% increase). Insulin resistance suspected.  Continue Low Dose SQ Insulin Correction Scale.  Continuye ADA diet.     ** Please call Endocrine for any BG related issues **    Discharge Recommendations:  Offer [place] [facility]  Have patient keep BG logs, and mail to [facility]. Address provided to patient during rounds.      Will most likely RX:  Insurance Preferred Diabetes testing supplies  Levemir 20 units daily.   32 G Conchis Pen needles  Metformin 500mg pills:  1 pill once daily at night for 3-4 nights THEN  1 pill with breakfast, 1 pill with dinner for next 3-4 days THEN  1 pill with breakfast, 2 pills with dinner for next 3-4 days THEN  2 pills with breakfast, 2 pills with dinner      Lab Results   Component Value Date    CREATININE 0.9 03/23/2019     Lab Results   Component Value Date    ALT 18 03/22/2019    AST 14 03/22/2019    ALKPHOS 68 03/22/2019    BILITOT 0.2 03/22/2019        Discharge Teaching:     Reviewed topics related to DM including: the need for insulin, how insulin works, what makes it a high risk medication, the importance of immediate follow up with either PCP or endocrine provider, importance of and how to check BG, how to record BG on logs, how to administer insulin, appropriate insulin administration sites, importance of rotating injection sites, hyper/hypoglycemia, how and when to treat hypoglycemia, when to hold insulin, importance of storing unused insulin in the refrigerator, and when to seek medical attention.  Patient verbalized understanding, answered all questions to patient's satisfaction.

## 2019-03-25 NOTE — PLAN OF CARE
Problem: Occupational Therapy Goal  Goal: Occupational Therapy Goal  Outcome: Outcome(s) achieved Date Met: 03/25/19  Initial eval completed   eval and d/c 3/25/19  Home; no OT needs     Macysa KANE Hyatt, OTR/L  [phone]  3/25/2019

## 2019-03-25 NOTE — PLAN OF CARE
Problem: Adult Inpatient Plan of Care  Goal: Plan of Care Review  Outcome: Revised  Pt free of falls/trauma/injuries.  Denies c/o SOB, CP, or discomfort.  Generalized skin remains CDI; No edema noted.  Pt tolerating plan of care.

## 2019-03-25 NOTE — CONSULTS
Consult received for diabetic diet education. RD saw pt and educated on diabetic diet yesterday 3/24.

## 2019-03-26 LAB — ANA SER QL IF: NORMAL

## 2019-03-26 NOTE — NURSING
Out patient pharmacy stated Dr ordered only script no glucometer, patient need glucometer and strip all.out patient pharmacy closing at 7 p, [facility] [phone] closes at 10 patient can collect from there if patient get script now.

## 2019-03-26 NOTE — PHYSICIAN QUERY
PT Name: Jose Hi  MR #: [id number]     PHYSICIAN QUERY -  ACUITY OF CONDITION CLARIFICATION      CDS/: Marlen Ortega               Contact information:[email or web address]  This form is a permanent document in the medical record.     Query Date: March 26, 2019    By submitting this query, we are merely seeking further clarification of documentation to reflect the severity of illness of your patient. Please utilize your independent clinical judgment when addressing the question(s) below.    The Medical record reflects the following:     Indicators   Supporting Clinical Findings Location in Medical Record    Documentation of condition Symptomatic hypoxia from submassive PE with associated moderate RV failure.     Interventional Cardiology PN 3/22    Lab Value(s)      Radiology Findings · Normal left ventricular systolic function. The estimated ejection fraction is 60%  · Normal LV diastolic function.  · Mildly reduced right ventricular systolic function. ( free wall strain -9, espite notmal TAPSE and S prime)  · Mild tricuspid regurgitation.  · Normal central venous pressure (3 mm Hg).  · The estimated PA systolic pressure is 29 mm Hg MACIEL  3/25    Treatment          Medication      Other       Provider, please specify the acuity/chronicity of __RV Failure __________________:    [  XXX ] Acute   [   ] Chronic   [   ] Subacute   [   ] Acute and/on chronic   [   ] Past history only, not a current diagnosis   [   ] Ruled Out   [   ]  Clinically Undetermined       Please document in your progress notes daily for the duration of treatment until resolved, and include in your discharge summary.

## 2019-03-26 NOTE — PLAN OF CARE
03/25/19 1106   Discharge Assessment   Assessment Type Discharge Planning Assessment   Confirmed/corrected address and phone number on facesheet? Yes   Assessment information obtained from? Patient   Expected Length of Stay (days) 3   Communicated expected length of stay with patient/caregiver yes   Prior to hospitilization cognitive status: Alert/Oriented   Prior to hospitalization functional status: Independent   Current cognitive status: Alert/Oriented   Current Functional Status: Independent   Lives With significant other;child(vicky), dependent   Able to Return to Prior Arrangements yes   Is patient able to care for self after discharge? Yes   Patient's perception of discharge disposition home or selfcare   Readmission Within the Last 30 Days no previous admission in last 30 days   Patient currently being followed by outpatient case management? No   Patient currently receives any other outside agency services? No   Equipment Currently Used at Home none   Do you have any problems affording any of your prescribed medications? No   Is the patient taking medications as prescribed? yes   Does the patient have transportation home? Yes   Transportation Anticipated family or friend will provide   Does the patient receive services at the Coumadin Clinic? No   Discharge Plan A Home with family   Discharge Plan B Home with family   Patient/Family in Agreement with Plan yes

## 2019-03-29 LAB
CARDIOLIPIN IGG SER IA-ACNC: <9.4 GPL (ref 0–14.99)
CARDIOLIPIN IGM SER IA-ACNC: <9.4 MPL (ref 0–12.49)

## 2019-04-01 ENCOUNTER — HOSPITAL ENCOUNTER (OUTPATIENT)
Dept: DIABETES | Facility: OTHER | Age: 39
Discharge: HOME | End: 2019-04-01
Attending: FAMILY MEDICINE
Payer: COMMERCIAL

## 2019-04-01 VITALS — WEIGHT: 278.69 LBS | BODY MASS INDEX: 41.28 KG/M2 | HEIGHT: 69 IN

## 2019-04-01 DIAGNOSIS — E11.9 NEW ONSET TYPE 2 DIABETES MELLITUS: Primary | ICD-10-CM

## 2019-04-01 PROCEDURE — G0108 DIAB MANAGE TRN  PER INDIV: HCPCS

## 2019-04-03 NOTE — PROGRESS NOTES
Diabetes Education  Author: Ling Guzman RN  Date: 4/3/2019    Diabetes Care Management Summary  Diabetes Education Record Assessment/Progress: Initial         Diabetes Type  Diabetes Type : Type II    Diabetes History  Diabetes Diagnosis: 0-1 year  Current Treatment: Oral Medication, Insulin  Reviewed Problem List with Patient: Yes    Health Maintenance was reviewed today with patient. Discussed with patient importance of routine eye exams, foot exams/foot care, blood work (i.e.: A1c, microalbumin, and lipid), dental visits, yearly flu vaccine, and pneumonia vaccine as indicated by PCP. Patient verbalized understanding.     Health Maintenance Topics with due status: Not Due       Topic Last Completion Date    Hemoglobin A1c 2019     Health Maintenance Due   Topic Date Due    Lipid Panel  1981    Foot Exam  1991    Eye Exam  1991    Urine Microalbumin  1991    TETANUS VACCINE  1999    Pneumococcal Vaccine (Medium Risk) (1 of 1 - PPSV23) 2000    Pap Smear with HPV Cotest  2002    Influenza Vaccine  2018       Nutrition  Meal Plan 24 Hour Recall - Breakfast: skipped - yesterday had: 2 eggs + 3 slices medina - 1 biscuit - juice    Meal Plan 24 Hour Recall - Lunch: chicken and rice - water   Meal Plan 24 Hour Recall - Dinner: macoroni and cheese, ribs, yams - juice   Meal Plan 24 Hour Recall - Snack: doesn't snack between meals     Monitoring   Self Monitoring : SMBG: AM Fastin, 278, 241, 202 Before Lunch: 268, Before dinner: 378 Before bed: 261    Blood Glucose Logs: No  Do you use a personal continuous glucose monitor?: No  In the last month, how often have you had a low blood sugar reaction?: never    Exercise   Exercise Type: (bedrest until sees cardiologist )  Frequency: Never    Current Diabetes Treatment   Current Treatment: Oral Medication, Insulin    Social History  Preferred Learning Method: Face to Face, Reading Materials  Primary Support:  (significant other)  Educational Level: College Graduate  Occupation: teacher   Smoking Status: Never a Smoker  Alcohol Use: Monthly                                Barriers to Change  Barriers to Change: None  Learning Challenges : None    Readiness to Learn   Readiness to Learn : Acceptance    Cultural Influences  Cultural Influences: No    Diabetes Education Assessment/Progress  Diabetes Disease Process (diabetes disease process and treatment options): Discussion, No Knowledge, Individual Session, Written Materials Provided(ed on what DM is, insulin resistance and possible progression of condition - ed on importance of lifestyle changes + medication management to keep BG well controlled)  Nutrition (Incorporating nutritional management into one's lifestyle): Discussion, Individual Session, No Knowledge, Written Materials Provided(ed on the plate method - label reading exercise/eliminate sweet drinks - ed on nutritious cho choices/appropriate portion sizes and increasing veggies, choosing lean proteins and healthy fats)  Physical Activity (incorporating physical activity into one's lifestyle): Discussion, Individual Session, No Knowledge, Written Materials Provided(began ed on ADA recs for physical activity - pt still recovering from PE)  Medications (states correct name, dose, onset, peak, duration, side effects & timing of meds): Discussion, Requires Assistance Family/SO, Individual Session, Written Materials Provided(pt taking 1000mg metformin BID, 30u levemir, tolerating well )  Monitoring (monitoring blood glucose/other parameters & using results): Discussion, Individual Session, Requires Assistance Family/SO, Written Materials Provided(ed on SMBG, log sheets provided, BG goals reviewed + how to use PP values to guide diet choices/changes)  Acute Complications (preventing, detecting, and treating acute complications): Discussion, Individual Session, No Knowledge, Written Materials Provided(ed on s/s of low BG  and how to prevent/treat)  Chronic Complications (preventing, detecting, and treating chronic complications): Discussion, Individual Session, No Knowledge, Written Materials Provided(ed on current a1C of 13.4%, goal A1C and importance of keeping BG well controlled to prevent complications r/t DM)  Clinical (diabetes, other pertinent medical history, and relevant comorbidities reviewed during visit): Discussion, Individual Session, No Knowledge, Written Materials Provided(ed on role of weight loss to help keep BG better controlled)  Cognitive (knowledge of self-management skills, functional health literacy): Discussion, Individual Session, No Knowledge, Written Materials Provided  Psychosocial (emotional response to diabetes): Discussion, Individual Session, No Knowledge, Written Materials Provided  Diabetes Distress and Support Systems: Discussion, Individual Session, No Knowledge, Written Materials Provided(pt significant other present, does a lot of cooking )  Behavioral (readiness for change, lifestyle practices, self-care behaviors): Discussion, Individual Session, No Knowledge, Written Materials Provided(pt very motivated to work on getting BG well controlled)    Goals  Patient has selected/evaluated goals during today's session: Yes, selected  Monitoring: Set(pt will SMBG BID and bring logs to all future appointments)  Start Date: 04/01/19  Medications: Set(pt will continue to work w/ PCP for medication adjustments/insulin titrations)  Start Date: 04/01/19         Diabetes Care Plan/Intervention  Education Plan/Intervention: Individual Follow-Up DSMT    Diabetes Meal Plan  Restrictions: Restricted Carbohydrate  Carbohydrate Per Meal: 30-45g    Today's Self-Management Care Plan was developed with the patient's input and is based on barriers identified during today's assessment.    The long and short-term goals in the care plan were written with the patient/caregiver's input. The patient has agreed to work  toward these goals to improve her overall diabetes control.      The patient received a copy of today's self-management plan and verbalized understanding of the care plan, goals, and all of today's instructions.      The patient was encouraged to communicate with her physician and care team regarding her condition(s) and treatment.  I provided the patient with my contact information today and encouraged her to contact me via phone or patient portal as needed.     Education Units of Time   Time Spent: 90 min

## 2019-04-10 ENCOUNTER — OFFICE VISIT (OUTPATIENT)
Dept: CARDIOLOGY | Facility: CLINIC | Age: 39
End: 2019-04-10
Payer: COMMERCIAL

## 2019-04-10 VITALS
BODY MASS INDEX: 40.89 KG/M2 | WEIGHT: 276.88 LBS | OXYGEN SATURATION: 98 % | DIASTOLIC BLOOD PRESSURE: 80 MMHG | SYSTOLIC BLOOD PRESSURE: 102 MMHG | HEART RATE: 74 BPM

## 2019-04-10 DIAGNOSIS — G47.33 OSA (OBSTRUCTIVE SLEEP APNEA): ICD-10-CM

## 2019-04-10 DIAGNOSIS — I82.411 DVT FEMORAL (DEEP VENOUS THROMBOSIS) WITH THROMBOPHLEBITIS, RIGHT: ICD-10-CM

## 2019-04-10 DIAGNOSIS — E11.9 NEW ONSET TYPE 2 DIABETES MELLITUS: ICD-10-CM

## 2019-04-10 DIAGNOSIS — E66.01 MORBIDLY OBESE: ICD-10-CM

## 2019-04-10 DIAGNOSIS — I26.09 OTHER ACUTE PULMONARY EMBOLISM WITH ACUTE COR PULMONALE: Primary | ICD-10-CM

## 2019-04-10 PROCEDURE — 3008F PR BODY MASS INDEX (BMI) DOCUMENTED: ICD-10-PCS | Mod: CPTII,S$GLB,, | Performed by: INTERNAL MEDICINE

## 2019-04-10 PROCEDURE — 99999 PR PBB SHADOW E&M-EST. PATIENT-LVL IV: ICD-10-PCS | Mod: PBBFAC,,, | Performed by: INTERNAL MEDICINE

## 2019-04-10 PROCEDURE — 3008F BODY MASS INDEX DOCD: CPT | Mod: CPTII,S$GLB,, | Performed by: INTERNAL MEDICINE

## 2019-04-10 PROCEDURE — 99214 OFFICE O/P EST MOD 30 MIN: CPT | Mod: S$GLB,,, | Performed by: INTERNAL MEDICINE

## 2019-04-10 PROCEDURE — 3046F HEMOGLOBIN A1C LEVEL >9.0%: CPT | Mod: CPTII,S$GLB,, | Performed by: INTERNAL MEDICINE

## 2019-04-10 PROCEDURE — 3046F PR MOST RECENT HEMOGLOBIN A1C LEVEL > 9.0%: ICD-10-PCS | Mod: CPTII,S$GLB,, | Performed by: INTERNAL MEDICINE

## 2019-04-10 PROCEDURE — 99214 PR OFFICE/OUTPT VISIT, EST, LEVL IV, 30-39 MIN: ICD-10-PCS | Mod: S$GLB,,, | Performed by: INTERNAL MEDICINE

## 2019-04-10 PROCEDURE — 99999 PR PBB SHADOW E&M-EST. PATIENT-LVL IV: CPT | Mod: PBBFAC,,, | Performed by: INTERNAL MEDICINE

## 2019-04-10 NOTE — LETTER
April 10, 2019      Marta Phelan DO  [address] [facility] [address]           [facility]  [address], [facility] [address]  [facility] [address]  Phone: [phone]  Fax: [phone]          Patient: Jose Hi   MR Number: [id number]   YOB: 1980   Date of Visit: 4/10/2019       Dear Dr. Marta Phelan:    Thank you for referring Jose Hi to me for evaluation. Attached you will find relevant portions of my assessment and plan of care.    If you have questions, please do not hesitate to call me. I look forward to following Jose Hi along with you.    Sincerely,    Tobias Mclaughlin MD    Enclosure  CC:  No Recipients    If you would like to receive this communication electronically, please contact [email or web address] or [phone] to request more information on [employer] Link access.    For providers and/or their staff who would like to refer a patient to [facility], please contact us through our one-stop-shop provider referral line, [facility], at [phone].    If you feel you have received this communication in error or would no longer like to receive these types of communications, please e-mail [email or web address]

## 2019-04-10 NOTE — PROGRESS NOTES
Subjective:    Patient ID:  Jose Hi is a 38 y.o. female who presents for follow-up of pulmonary embolism    HPI   The patient is a 38 year old female was admitted to [facility] cardiology CCU for Acute pulmonary thromboembolism with RV strain 3/22/19. She is S/p catheter directed thrombolytic therapy. Patient was subsequently started on Eliquis. Echo with normal left ventricular systolic function. EF of 65%. Mild right ventricular enlargement. Mildly reduced right ventricular systolic function. Labs significant for HGBA1C of 13.4, no prior history of diabetes. Endocrinology was consulted, patient started on Insulin inpatient. Patient also received diabetes education. Endocrinology recommended patient be discharged with Metformin 500 mg. The DVT/PE was unprovoked and has no family history. She reports SOB and MONZON. SPO2 at rest on RA is 98%. She reports some swelling in her right leg. She snores and has hypersomnolence.      vConclusion 3/25/19    · Normal left ventricular systolic function. The estimated ejection fraction is 60%  · Normal LV diastolic function.  · Mildly reduced right ventricular systolic function. ( free wall strain -9, espite notmal TAPSE and S prime)  · Mild tricuspid regurgitation.  · Normal central venous pressure (3 mm Hg).  · The estimated PA systolic pressure is 29 mm Hg           Impression 3/25/19        Duplication of right popliteal vein with one lumen remaining patent while the other demonstrates an intraluminal thrombus.  There is additional thrombus noted in the distal right peroneal vein.    This report was flagged in Epic as abnormal.              Lab Results   Component Value Date     (L) 03/25/2019    K 4.4 03/25/2019     03/25/2019    CO2 23 03/25/2019    BUN 8 03/25/2019    CREATININE 0.8 03/25/2019     (H) 03/25/2019    HGBA1C 13.4 (H) 03/23/2019    AST 14 03/22/2019    ALT 18 03/22/2019    ALBUMIN 3.5 03/22/2019    PROT 8.2 03/22/2019    BILITOT 0.2  "03/22/2019    WBC 5.02 03/24/2019    HGB 13.1 03/24/2019    HCT 41.2 03/24/2019    MCV 84 03/24/2019     03/24/2019    INR 1.0 03/23/2019         No results found for: CHOL, HDL, TRIG    No results found for: LDLCALC    No past medical history on file.    Current Outpatient Medications:     apixaban (ELIQUIS) 5 mg Tab, Take 1 tablet (5 mg total) by mouth 2 (two) times daily., Disp: 60 tablet, Rfl: 2    blood sugar diagnostic Strp, Test 4 (four) times daily., Disp: 150 strip, Rfl: 0    blood sugar diagnostic Strp, 1 each by Misc.(Non-Drug; Combo Route) route 3 (three) times daily., Disp: 270 strip, Rfl: 3    blood-glucose meter kit, Use as instructed, Disp: 1 each, Rfl: 0    insulin detemir U-100 (LEVEMIR FLEXTOUCH) 100 unit/mL (3 mL) SubQ InPn pen, Inject 20 Units into the skin once daily., Disp: 18 mL, Rfl: 3    lancets (ACCU-CHEK MULTICLIX LANCET) Misc, 1 each by Misc.(Non-Drug; Combo Route) route 3 (three) times daily., Disp: 200 each, Rfl: 6    metFORMIN (GLUCOPHAGE) 500 MG tablet, 1 pill once daily at night for 3-4 nights THEN 1 pill with breakfast, 1 pill with dinner for next 3-4 days THEN 1 pill with breakfast, 2 pills with dinner for next 3-4 days THEN 2 pills with breakfast, 2 pills with dinner , Disp: 90 tablet, Rfl: 3    pen needle, diabetic 32 gauge x 5/32" Ndle, Use once daily, Disp: 100 each, Rfl: 3          Review of Systems   Constitution: Positive for malaise/fatigue. Negative for decreased appetite, diaphoresis, fever, weight gain and weight loss.   HENT: Negative for congestion, ear discharge, ear pain and nosebleeds.    Eyes: Negative for blurred vision, double vision and visual disturbance.   Cardiovascular: Positive for dyspnea on exertion. Negative for chest pain, claudication, cyanosis, irregular heartbeat, leg swelling, near-syncope, orthopnea, palpitations, paroxysmal nocturnal dyspnea and syncope.   Respiratory: Positive for shortness of breath. Negative for cough, " hemoptysis, sleep disturbances due to breathing, snoring, sputum production and wheezing.    Endocrine: Negative for polydipsia, polyphagia and polyuria.   Hematologic/Lymphatic: Negative for adenopathy and bleeding problem. Does not bruise/bleed easily.   Skin: Negative for color change, nail changes, poor wound healing and rash.   Musculoskeletal: Negative for muscle cramps and muscle weakness.   Gastrointestinal: Negative for abdominal pain, anorexia, change in bowel habit, hematochezia, nausea and vomiting.   Genitourinary: Negative for dysuria, frequency and hematuria.   Neurological: Negative for brief paralysis, difficulty with concentration, excessive daytime sleepiness, dizziness, focal weakness, headaches, light-headedness, seizures, vertigo and weakness.   Psychiatric/Behavioral: Negative for altered mental status and depression.   Allergic/Immunologic: Negative for persistent infections.        Objective:/80 (BP Location: Right arm, Patient Position: Sitting)   Pulse 74   Wt 125.6 kg (276 lb 14.4 oz)   LMP 03/15/2019   SpO2 98%   BMI 40.89 kg/m²             Physical Exam   Constitutional: She is oriented to person, place, and time. She appears well-developed and well-nourished.   Morbid obese   HENT:   Head: Normocephalic.   Right Ear: External ear normal.   Left Ear: External ear normal.   Nose: Nose normal.   Inspection of lips, teeth and gums normal   Eyes: Pupils are equal, round, and reactive to light. Conjunctivae and EOM are normal. No scleral icterus.   Neck: Normal range of motion. No JVD present. No tracheal deviation present. No thyromegaly present.   Cardiovascular: Normal rate, regular rhythm, S1 normal, S2 normal and intact distal pulses. Exam reveals no gallop and no friction rub.   No murmur heard.  Pulses:       Dorsalis pedis pulses are 2+ on the right side, and 2+ on the left side.        Posterior tibial pulses are 2+ on the right side, and 2+ on the left side.    Pulmonary/Chest: Effort normal and breath sounds normal. No respiratory distress. She has no wheezes. She has no rales. She exhibits no tenderness.   Abdominal: Soft. Bowel sounds are normal. She exhibits no distension. There is no hepatosplenomegaly. There is no tenderness. There is no guarding.   Musculoskeletal: Normal range of motion. She exhibits no edema or tenderness.   Lymphadenopathy:   Palpation of lymph nodes of neck and groin normal   Neurological: She is oriented to person, place, and time. No cranial nerve deficit. She exhibits normal muscle tone. Coordination normal.   Skin: Skin is warm and dry. No rash noted. No erythema. No pallor.   No ankle nor pretibial edema   Psychiatric: She has a normal mood and affect. Her behavior is normal. Judgment and thought content normal.         Assessment:       1. Other acute pulmonary embolism with acute cor pulmonale    2. DVT femoral (deep venous thrombosis) with thrombophlebitis, right    3. New onset type 2 diabetes mellitus    4. Morbidly obese    5. NABILA (obstructive sleep apnea)         Plan:       Jose was seen today for consult and follow-up.    Diagnoses and all orders for this visit:    Other acute pulmonary embolism with acute cor pulmonale    DVT femoral (deep venous thrombosis) with thrombophlebitis, right  -     COMPRESSION STOCKINGS    New onset type 2 diabetes mellitus    Morbidly obese    NABILA (obstructive sleep apnea)  -     Ambulatory referral to Sleep Disorders

## 2019-04-22 ENCOUNTER — HOSPITAL ENCOUNTER (OUTPATIENT)
Dept: DIABETES | Facility: OTHER | Age: 39
Discharge: HOME | End: 2019-04-22
Attending: UROLOGY
Payer: COMMERCIAL

## 2019-04-22 VITALS — WEIGHT: 281.75 LBS | BODY MASS INDEX: 41.61 KG/M2

## 2019-04-22 DIAGNOSIS — E11.9 NEW ONSET TYPE 2 DIABETES MELLITUS: Primary | ICD-10-CM

## 2019-04-22 PROCEDURE — G0108 DIAB MANAGE TRN  PER INDIV: HCPCS

## 2019-04-24 NOTE — PROGRESS NOTES
Diabetes Education  Author: Ling Guzman RN  Date: 2019    Diabetes Care Management Summary  Diabetes Education Record Assessment/Progress: Comprehensive/Group         Diabetes Type  Diabetes Type : Type II    Diabetes History  Current Treatment: Oral Medication, Insulin  Reviewed Problem List with Patient: Yes    Health Maintenance was reviewed today with patient. Discussed with patient importance of routine eye exams, foot exams/foot care, blood work (i.e.: A1c, microalbumin, and lipid), dental visits, yearly flu vaccine, and pneumonia vaccine as indicated by PCP. Patient verbalized understanding.     Health Maintenance Topics with due status: Not Due       Topic Last Completion Date    Hemoglobin A1c 2019     Health Maintenance Due   Topic Date Due    Lipid Panel  1981    Foot Exam  1991    Eye Exam  1991    Urine Microalbumin  1991    TETANUS VACCINE  1999    Pneumococcal Vaccine (Medium Risk) (1 of 1 - PPSV23) 2000    Pap Smear with HPV Cotest  2002    Influenza Vaccine  2018       Nutrition  Meal Plan 24 Hour Recall - Breakfast: sausage miguel, 2 eggs - water   Meal Plan 24 Hour Recall - Lunch: crawfish bisque - coke zero   Meal Plan 24 Hour Recall - Dinner: crawfish bisque - water   Meal Plan 24 Hour Recall - Snack: boiled eggs (4) throughout the day    Monitoring   Self Monitoring : SMBG: AM Fastin, 247, 243, 228, 235, 265, 190, 241, 236, 252 Before Lunch: 305, 194, 189, 266, 241 2 hr PP Dinner: 293, 261, 371, 205, 368, 274 Bedtime: 249, 367, 299, 371, 303, 242, 142, 368, 213  Blood Glucose Logs: Yes  Do you use a personal continuous glucose monitor?: No    Exercise   Exercise Type: (has not been cleared by cardiology for exercise)  Frequency: Never    Current Diabetes Treatment   Current Treatment: Oral Medication, Insulin                                     Barriers to Change  Barriers to Change: None  Learning Challenges :  None    Readiness to Learn   Readiness to Learn : Eager    Cultural Influences  Cultural Influences: No    Diabetes Education Assessment/Progress  Diabetes Disease Process (diabetes disease process and treatment options): Not Covered/Deferred  Nutrition (Incorporating nutritional management into one's lifestyle): Discussion, Requires Assistance Family/SO, Individual Session(pt has been able to cut all sugary drinks out of diet, is working on plate method portions)  Physical Activity (incorporating physical activity into one's lifestyle): Not Covered/Deferred  Medications (states correct name, dose, onset, peak, duration, side effects & timing of meds): Discussion, Requires Assistance Family/SO, Individual Session, Written Materials Provided(failed metformin/victoza - now on levemir + novolog 15u + s/s - ed on s/s and testing BG - willing to try different GLP-1 in a few months)  Monitoring (monitoring blood glucose/other parameters & using results): Discussion, Requires Assistance Family/SO, Individual Session, Written Materials Provided(new logs given, ed on how to use s/s novolog and times to SMBG - reviewed ultimate BG goals )  Acute Complications (preventing, detecting, and treating acute complications): Discussion, Requires Assistance Family/SO, Individual Session(reviewed s/s of low Bg and how to prevent/treat)  Chronic Complications (preventing, detecting, and treating chronic complications): Not Covered/Deferred  Clinical (diabetes, other pertinent medical history, and relevant comorbidities reviewed during visit): Not Covered/Deferred  Cognitive (knowledge of self-management skills, functional health literacy): Not Covered/Deferred  Psychosocial (emotional response to diabetes): Discussion, Requires Assistance Family/SO, Individual Session(pt talked about her emotional journey, and how she ready to ask for help/cut back hours at work and do what she needs to do to care for herself)  Diabetes Distress and  Support Systems: Not Covered/Deferred  Behavioral (readiness for change, lifestyle practices, self-care behaviors): Discussion, Requires Assistance Family/SO, Individual Session(pt has made significant strides in managing her DM - working on getting medications straight now, pt is very committed )    Goals  Patient has selected/evaluated goals during today's session: Yes, evaluated  Monitoring: In Progress  Medications: In Progress         Diabetes Care Plan/Intervention  Education Plan/Intervention: Individual Follow-Up DSMT    Diabetes Meal Plan  Restrictions: Restricted Carbohydrate  Carbohydrate Per Meal: 30-45g    Today's Self-Management Care Plan was developed with the patient's input and is based on barriers identified during today's assessment.    The long and short-term goals in the care plan were written with the patient/caregiver's input. The patient has agreed to work toward these goals to improve her overall diabetes control.      The patient received a copy of today's self-management plan and verbalized understanding of the care plan, goals, and all of today's instructions.      The patient was encouraged to communicate with her physician and care team regarding her condition(s) and treatment.  I provided the patient with my contact information today and encouraged her to contact me via phone or patient portal as needed.     Education Units of Time   Time Spent: 60 min

## 2019-05-10 ENCOUNTER — HOSPITAL ENCOUNTER (OUTPATIENT)
Dept: DIABETES | Facility: OTHER | Age: 39
Discharge: HOME | End: 2019-05-10
Attending: NURSE PRACTITIONER
Payer: COMMERCIAL

## 2019-05-10 VITALS — BODY MASS INDEX: 41.57 KG/M2 | WEIGHT: 281.5 LBS

## 2019-05-10 DIAGNOSIS — E11.9 NEW ONSET TYPE 2 DIABETES MELLITUS: Primary | ICD-10-CM

## 2019-05-10 PROCEDURE — G0108 DIAB MANAGE TRN  PER INDIV: HCPCS

## 2019-05-10 RX ORDER — INSULIN ASPART 100 [IU]/ML
INJECTION, SOLUTION INTRAVENOUS; SUBCUTANEOUS
Refills: 1 | COMMUNITY
Start: 2019-04-12 | End: 2019-12-16 | Stop reason: CLARIF

## 2019-05-10 NOTE — PROGRESS NOTES
Diabetes Education  Author: Ling Guzman RN  Date: 5/10/2019    Diabetes Care Management Summary  Diabetes Education Record Assessment/Progress: Comprehensive/Group         Diabetes Type  Diabetes Type : Type II    Diabetes History  Current Treatment: Insulin  Reviewed Problem List with Patient: Yes    Health Maintenance was reviewed today with patient. Discussed with patient importance of routine eye exams, foot exams/foot care, blood work (i.e.: A1c, microalbumin, and lipid), dental visits, yearly flu vaccine, and pneumonia vaccine as indicated by PCP. Patient verbalized understanding.     Health Maintenance Topics with due status: Not Due       Topic Last Completion Date    Hemoglobin A1c 2019    Influenza Vaccine Not Due     Health Maintenance Due   Topic Date Due    Lipid Panel  1981    Foot Exam  1991    Eye Exam  1991    Urine Microalbumin  1991    TETANUS VACCINE  1999    Pneumococcal Vaccine (Medium Risk) (1 of 1 - PPSV23) 2000    Pap Smear with HPV Cotest  2002       Nutrition  Meal Plan 24 Hour Recall - Breakfast: 3 eggs, water - SF flavoring   Meal Plan 24 Hour Recall - Lunch: brown rice, sweet n sour chicken - diet coke   Meal Plan 24 Hour Recall - Dinner: broccoli, rice, grilled pork chop - water   Meal Plan 24 Hour Recall - Snack: peanut butter cracker     Monitoring   Self Monitoring : SMBG: AM Fastin, 147, 170, 222, 223, 251, 203, 251, 173, 216, 195, 196, 221, 183, 210, 232, 262, 235 Before Lunch: 119, 144, 180, 237, 221, 250, 143, 170, 192, 172, 163, 185, 109, 135, 228, 289 Before Dinner: 185, 169, 207, 126, 200, 130, 200, 173, 226, 135 Bedtime: 141, 118, 126, 274, 184, 127, 149, 200, 173, 157, 175   Blood Glucose Logs: Yes  Do you use a personal continuous glucose monitor?: No  In the last month, how often have you had a low blood sugar reaction?: never         Current Diabetes Treatment   Current Treatment: Insulin                                                     Diabetes Education Assessment/Progress  Diabetes Disease Process (diabetes disease process and treatment options): Not Covered/Deferred  Nutrition (Incorporating nutritional management into one's lifestyle): Not Covered/Deferred  Physical Activity (incorporating physical activity into one's lifestyle): Discussion, Requires Assistance Family/SO, Individual Session(pt is slowly able to more ADL without dyspnea on exertion - has more energy as BG coming into control as well)  Medications (states correct name, dose, onset, peak, duration, side effects & timing of meds): Discussion, Requires Assistance Family/SO, Individual Session(levemir and novolog adjusted, BG much improved - brainstormed ideas for travelling w/ insulin to help w/ compliance in treatment )  Monitoring (monitoring blood glucose/other parameters & using results): Not Covered/Deferred  Acute Complications (preventing, detecting, and treating acute complications): Discussion, Requires Assistance Family/SO, Individual Session(as pt coming into compliance, discussed increased risk of low BG - encouraged pt to purchase glucose tabs and keep in car/purse etc.)  Chronic Complications (preventing, detecting, and treating chronic complications): Not Covered/Deferred  Clinical (diabetes, other pertinent medical history, and relevant comorbidities reviewed during visit): Not Covered/Deferred  Cognitive (knowledge of self-management skills, functional health literacy): Not Covered/Deferred  Psychosocial (emotional response to diabetes): Not Covered/Deferred  Diabetes Distress and Support Systems: Not Covered/Deferred  Behavioral (readiness for change, lifestyle practices, self-care behaviors): Discussion, Requires Assistance Family/SO, Individual Session(pt feeling so much better, is excited to see progress w/ numbers - working hard to make changes )    Goals  Patient has selected/evaluated goals during today's session: Yes,  evaluated  Monitoring: In Progress  Medications: In Progress         Diabetes Care Plan/Intervention  Education Plan/Intervention: Individual Follow-Up DSMT    Diabetes Meal Plan  Restrictions: Restricted Carbohydrate  Carbohydrate Per Meal: 30-45g    Today's Self-Management Care Plan was developed with the patient's input and is based on barriers identified during today's assessment.    The long and short-term goals in the care plan were written with the patient/caregiver's input. The patient has agreed to work toward these goals to improve her overall diabetes control.      The patient received a copy of today's self-management plan and verbalized understanding of the care plan, goals, and all of today's instructions.      The patient was encouraged to communicate with her physician and care team regarding her condition(s) and treatment.  I provided the patient with my contact information today and encouraged her to contact me via phone or patient portal as needed.     Education Units of Time   Time Spent: 60 min

## 2019-05-15 ENCOUNTER — OFFICE VISIT (OUTPATIENT)
Dept: CARDIOLOGY | Facility: CLINIC | Age: 39
End: 2019-05-15
Payer: COMMERCIAL

## 2019-05-15 VITALS
DIASTOLIC BLOOD PRESSURE: 78 MMHG | WEIGHT: 288.81 LBS | HEART RATE: 74 BPM | BODY MASS INDEX: 42.65 KG/M2 | SYSTOLIC BLOOD PRESSURE: 119 MMHG | OXYGEN SATURATION: 99 %

## 2019-05-15 DIAGNOSIS — I26.09 OTHER CHRONIC PULMONARY EMBOLISM WITH ACUTE COR PULMONALE: ICD-10-CM

## 2019-05-15 DIAGNOSIS — I82.411 DVT FEMORAL (DEEP VENOUS THROMBOSIS) WITH THROMBOPHLEBITIS, RIGHT: ICD-10-CM

## 2019-05-15 DIAGNOSIS — E11.9 NEW ONSET TYPE 2 DIABETES MELLITUS: ICD-10-CM

## 2019-05-15 DIAGNOSIS — G47.33 OSA (OBSTRUCTIVE SLEEP APNEA): ICD-10-CM

## 2019-05-15 DIAGNOSIS — E66.01 MORBIDLY OBESE: ICD-10-CM

## 2019-05-15 DIAGNOSIS — I27.82 OTHER CHRONIC PULMONARY EMBOLISM WITH ACUTE COR PULMONALE: ICD-10-CM

## 2019-05-15 DIAGNOSIS — R06.09 DOE (DYSPNEA ON EXERTION): Primary | ICD-10-CM

## 2019-05-15 DIAGNOSIS — R06.01 ORTHOPNEA: ICD-10-CM

## 2019-05-15 PROCEDURE — 99999 PR PBB SHADOW E&M-EST. PATIENT-LVL III: ICD-10-PCS | Mod: PBBFAC,,, | Performed by: INTERNAL MEDICINE

## 2019-05-15 PROCEDURE — 99999 PR PBB SHADOW E&M-EST. PATIENT-LVL III: CPT | Mod: PBBFAC,,, | Performed by: INTERNAL MEDICINE

## 2019-05-15 PROCEDURE — 3046F PR MOST RECENT HEMOGLOBIN A1C LEVEL > 9.0%: ICD-10-PCS | Mod: CPTII,S$GLB,, | Performed by: INTERNAL MEDICINE

## 2019-05-15 PROCEDURE — 99214 PR OFFICE/OUTPT VISIT, EST, LEVL IV, 30-39 MIN: ICD-10-PCS | Mod: S$GLB,,, | Performed by: INTERNAL MEDICINE

## 2019-05-15 PROCEDURE — 3046F HEMOGLOBIN A1C LEVEL >9.0%: CPT | Mod: CPTII,S$GLB,, | Performed by: INTERNAL MEDICINE

## 2019-05-15 PROCEDURE — 99214 OFFICE O/P EST MOD 30 MIN: CPT | Mod: S$GLB,,, | Performed by: INTERNAL MEDICINE

## 2019-05-15 PROCEDURE — 3008F BODY MASS INDEX DOCD: CPT | Mod: CPTII,S$GLB,, | Performed by: INTERNAL MEDICINE

## 2019-05-15 PROCEDURE — 3008F PR BODY MASS INDEX (BMI) DOCUMENTED: ICD-10-PCS | Mod: CPTII,S$GLB,, | Performed by: INTERNAL MEDICINE

## 2019-05-15 NOTE — PROGRESS NOTES
"Subjective:    Patient ID:  Jose Hi is a 39 y.o. female who presents for follow-up of pulmonary embolus    HPI    The patient is a 39 year old female was [place] [facility] for acute pulmonary thromboembolism with RV strain 3/22/19. She is S/p catheter directed thrombolytic therapy. Patient was subsequently started on Eliquis.She was found to have DVT in her right leg. Echo with normal left ventricular systolic function. EF of 65%. Mild right ventricular enlargement. Mildly reduced right ventricular systolic function. Labs significant for HGBA1C of 13.4, no prior history of diabetes. Endocrinology was consulted, patient started on Insulin inpatient. She was found to have symptoms of NABILA and was referred to Sleep Clinic but not scheduled yet. She reports improved MONZON. She reports increase SOB with laying down with a "presure" discomfort in mid sternal area.. She has been sleeping in a decliner. SPO2 upright was 99 and on lying down was 95%. EKG is normal.  Lab Results   Component Value Date     (L) 03/25/2019    K 4.4 03/25/2019     03/25/2019    CO2 23 03/25/2019    BUN 8 03/25/2019    CREATININE 0.8 03/25/2019     (H) 03/25/2019    HGBA1C 13.4 (H) 03/23/2019    AST 14 03/22/2019    ALT 18 03/22/2019    ALBUMIN 3.5 03/22/2019    PROT 8.2 03/22/2019    BILITOT 0.2 03/22/2019    WBC 5.02 03/24/2019    HGB 13.1 03/24/2019    HCT 41.2 03/24/2019    MCV 84 03/24/2019     03/24/2019    INR 1.0 03/23/2019         No results found for: CHOL, HDL, TRIG    No results found for: LDLCALC    No past medical history on file.    Current Outpatient Medications:     apixaban (ELIQUIS) 5 mg Tab, Take 1 tablet (5 mg total) by mouth 2 (two) times daily., Disp: 60 tablet, Rfl: 2    blood sugar diagnostic Strp, Test 4 (four) times daily., Disp: 150 strip, Rfl: 0    blood sugar diagnostic Strp, 1 each by Misc.(Non-Drug; Combo Route) route 3 (three) times daily., Disp: 270 strip, Rfl: 3    " "blood-glucose meter kit, Use as instructed, Disp: 1 each, Rfl: 0    insulin detemir U-100 (LEVEMIR FLEXTOUCH U-100 INSULN) 100 unit/mL (3 mL) SubQ InPn pen, 40 units, Disp: , Rfl:     insulin detemir U-100 (LEVEMIR FLEXTOUCH) 100 unit/mL (3 mL) SubQ InPn pen, Inject 20 Units into the skin once daily., Disp: 18 mL, Rfl: 3    lancets (ACCU-CHEK MULTICLIX LANCET) Misc, 1 each by Misc.(Non-Drug; Combo Route) route 3 (three) times daily., Disp: 200 each, Rfl: 6    NOVOLOG FLEXPEN U-100 INSULIN 100 unit/mL (3 mL) InPn pen, INJECT 10 UNITS SC WITH BIGGEST MEAL OF THE DAY, Disp: , Rfl: 1    pen needle, diabetic 32 gauge x 5/32" Ndle, Use once daily, Disp: 100 each, Rfl: 3          Review of Systems   Constitution: Negative for decreased appetite, diaphoresis, fever, malaise/fatigue, weight gain and weight loss.   HENT: Negative for congestion, ear discharge, ear pain and nosebleeds.    Eyes: Negative for blurred vision, double vision and visual disturbance.   Cardiovascular: Positive for chest pain, dyspnea on exertion and orthopnea. Negative for claudication, cyanosis, irregular heartbeat, leg swelling, near-syncope, palpitations, paroxysmal nocturnal dyspnea and syncope.   Respiratory: Negative for cough, hemoptysis, shortness of breath, sleep disturbances due to breathing, snoring, sputum production and wheezing.    Endocrine: Negative for polydipsia, polyphagia and polyuria.   Hematologic/Lymphatic: Negative for adenopathy and bleeding problem. Does not bruise/bleed easily.   Skin: Negative for color change, nail changes, poor wound healing and rash.   Musculoskeletal: Negative for muscle cramps and muscle weakness.   Gastrointestinal: Negative for abdominal pain, anorexia, change in bowel habit, hematochezia, nausea and vomiting.   Genitourinary: Negative for dysuria, frequency and hematuria.   Neurological: Negative for brief paralysis, difficulty with concentration, excessive daytime sleepiness, dizziness, focal " weakness, headaches, light-headedness, seizures, vertigo and weakness.   Psychiatric/Behavioral: Negative for altered mental status and depression.   Allergic/Immunologic: Negative for persistent infections.        Objective:/78   Pulse 74   Wt 131 kg (288 lb 12.8 oz)   SpO2 99%   BMI 42.65 kg/m²             Physical Exam   Constitutional: She is oriented to person, place, and time. She appears well-developed and well-nourished.   morbid obese   HENT:   Head: Normocephalic.   Right Ear: External ear normal.   Left Ear: External ear normal.   Nose: Nose normal.   Inspection of lips, teeth and gums normal   Eyes: Pupils are equal, round, and reactive to light. Conjunctivae and EOM are normal. No scleral icterus.   Neck: Normal range of motion. No JVD present. No tracheal deviation present. No thyromegaly present.   Cardiovascular: Normal rate, regular rhythm, normal heart sounds and intact distal pulses. Exam reveals no gallop and no friction rub.   No murmur heard.  Pulmonary/Chest: Effort normal and breath sounds normal. No respiratory distress. She has no wheezes. She has no rales. She exhibits no tenderness.   Abdominal: Soft. Bowel sounds are normal. She exhibits no distension. There is no hepatosplenomegaly. There is no tenderness. There is no guarding.   Musculoskeletal: Normal range of motion. She exhibits no edema or tenderness.   Lymphadenopathy:   Palpation of lymph nodes of neck and groin normal   Neurological: She is oriented to person, place, and time. No cranial nerve deficit. She exhibits normal muscle tone. Coordination normal.   Skin: Skin is warm and dry. No rash noted. No erythema. No pallor.   Palpation of skin normal   Psychiatric: She has a normal mood and affect. Her behavior is normal. Judgment and thought content normal.         Assessment:       1. MONZON (dyspnea on exertion)    2. Orthopnea    3. Other chronic pulmonary embolism with acute cor pulmonale    4. DVT femoral (deep  venous thrombosis) with thrombophlebitis, right    5. New onset type 2 diabetes mellitus    6. Morbidly obese    7. NABILA (obstructive sleep apnea)         Plan:       Jose was seen today for chest pain and shortness of breath.    Diagnoses and all orders for this visit:    MONZON (dyspnea on exertion)  -     Six Minute Walk Test to qualify for Home Oxygen; Future  -     Ambulatory consult to Pulmonology    Orthopnea  -     Six Minute Walk Test to qualify for Home Oxygen; Future  -     Ambulatory consult to Pulmonology    Other chronic pulmonary embolism with acute cor pulmonale  -     Six Minute Walk Test to qualify for Home Oxygen; Future  -     Ambulatory consult to Pulmonology    DVT femoral (deep venous thrombosis) with thrombophlebitis, right    New onset type 2 diabetes mellitus    Morbidly obese    NABILA (obstructive sleep apnea)  -     Ambulatory referral to Sleep Disorders

## 2019-05-16 ENCOUNTER — HOSPITAL ENCOUNTER (OUTPATIENT)
Dept: PULMONOLOGY | Facility: CLINIC | Age: 39
Discharge: HOME | End: 2019-05-16
Payer: COMMERCIAL

## 2019-05-16 VITALS — HEIGHT: 68 IN | BODY MASS INDEX: 42.59 KG/M2 | WEIGHT: 281 LBS

## 2019-05-16 DIAGNOSIS — I27.82 OTHER CHRONIC PULMONARY EMBOLISM WITH ACUTE COR PULMONALE: ICD-10-CM

## 2019-05-16 DIAGNOSIS — R06.01 ORTHOPNEA: ICD-10-CM

## 2019-05-16 DIAGNOSIS — R06.09 DOE (DYSPNEA ON EXERTION): ICD-10-CM

## 2019-05-16 DIAGNOSIS — I26.09 OTHER CHRONIC PULMONARY EMBOLISM WITH ACUTE COR PULMONALE: ICD-10-CM

## 2019-05-16 PROCEDURE — 94618 PULMONARY STRESS TESTING: ICD-10-PCS | Mod: S$GLB,,, | Performed by: INTERNAL MEDICINE

## 2019-05-16 PROCEDURE — 94618 PULMONARY STRESS TESTING: CPT | Mod: S$GLB,,, | Performed by: INTERNAL MEDICINE

## 2019-05-16 NOTE — PROCEDURES
Jose Hi is a 39 y.o.  female patient, who presents for a 6 minute walk test ordered by MD Arnoldo.  The diagnosis is Qualify for Oxygen.  The patient's BMI is 42.8 kg/m2.  Predicted distance (lower limit of normal) is 389.39 meters.      Test Results:    The test was completed with stops.  The patient stopped 2 times for a total of 22 seconds.  The total time walked was 338 seconds.  During walking, the patient reported:  Dyspnea, Lightheadedness. The patient used no assistive devices  during testing.     05/16/2019---------Distance: 243.84 meters (800 feet)     O2 Sat % Supplemental Oxygen Heart Rate Blood Pressure Aliyah Scale   Pre-exercise  (Resting) 99 % Room Air 86 bpm 124/67 mmHg 4   During Exercise 99 % Room Air 117 bpm 120/56 mmHg 7-8   Post-exercise  (Recovery) 99 % Room Air  86 bpm   mmHg       Recovery Time: 79 seconds    Performing nurse/tech: SHONDA Mclaughlin CRT      PREVIOUS STUDY:   The patient has not had a previous study.      CLINICAL INTERPRETATION:  Six minute walk distance is 243.84 meters (800 feet) with very heavy dyspnea.  During exercise, there was no significant desaturation while breathing room air.  Blood pressure remained stable and Heart rate increased significantly with walking.  This may represent a tachycardic response to exercise.  The patient reported non-pulmonary symptoms during exercise.  Significant exercise impairment is likely due to cardiovascular causes.  The patient did complete the study, walking 338 seconds of the 360 second test.  No previous study performed.  Based upon age and body mass index, exercise capacity is less than predicted.

## 2019-05-17 ENCOUNTER — TELEPHONE (OUTPATIENT)
Dept: CARDIOLOGY | Facility: CLINIC | Age: 39
End: 2019-05-17

## 2019-05-17 NOTE — TELEPHONE ENCOUNTER
Spoke with patient. Stated she was returning a call from Mary possibly in regards to some papers to be filled out. Made patient aware that she will be contacted back to resolve this matter.

## 2019-05-17 NOTE — TELEPHONE ENCOUNTER
----- Message from Higinio Solis sent at 5/17/2019  4:10 PM CDT -----  Type:  Patient Returning Call    Who Called:  Patient  Who Left Message for Patient:  Nona  Does the patient know what this is regarding?:  Walk or Sleep study?  Best Call Back Number:  [phone]  Additional Information:

## 2019-05-31 ENCOUNTER — TELEPHONE (OUTPATIENT)
Dept: CARDIOLOGY | Facility: CLINIC | Age: 39
End: 2019-05-31

## 2019-05-31 ENCOUNTER — HOSPITAL ENCOUNTER (OUTPATIENT)
Dept: DIABETES | Facility: OTHER | Age: 39
Discharge: HOME | End: 2019-05-31
Attending: FAMILY MEDICINE
Payer: COMMERCIAL

## 2019-05-31 VITALS — BODY MASS INDEX: 43.28 KG/M2 | WEIGHT: 284.63 LBS

## 2019-05-31 DIAGNOSIS — E11.9 NEW ONSET TYPE 2 DIABETES MELLITUS: Primary | ICD-10-CM

## 2019-05-31 PROCEDURE — G0108 DIAB MANAGE TRN  PER INDIV: HCPCS

## 2019-05-31 NOTE — TELEPHONE ENCOUNTER
Patient returned my call. We had been in contact regarding her insurance and a sleep study test that Dr. Mclaughlin had ordered, as well as her short-term disability paperwork. I informed her that I had faxed over the short-term disability paperwork to Dr. Mclaughlin's office to complete and sign. Once she meets with the pulmonologist for her upcoming appointment, her sleep study will be revisited and ordered. All questions answered; patient verbalized understanding; no other issues discussed.

## 2019-05-31 NOTE — TELEPHONE ENCOUNTER
----- Message from Serena Pierre MA sent at 5/30/2019  1:45 PM CDT -----      ----- Message -----  From: Cathy Danielson  Sent: 5/30/2019  10:40 AM  To: [facility] Clinical Support    Type: Needs Medical Advice    Who Called:  Patient  Best Call Back Number: [phone]  Additional Information: Patient calling nurse (Eddie)back concerning paperwork for short term disability and sleep study/has not heard from anyone/please call patient back to advise.

## 2019-05-31 NOTE — TELEPHONE ENCOUNTER
I called and spoke with patient yesterday 5/30/19, and I informed her that I will be following-up with her this morning. I called patient; no answer; left voicemail.

## 2019-06-02 NOTE — PROGRESS NOTES
Diabetes Education  Author: Ling Guzman RN  Date: 6/1/2019    Diabetes Care Management Summary  Diabetes Education Record Assessment/Progress: Comprehensive/Group         Diabetes Type  Diabetes Type : Type II    Diabetes History  Current Treatment: Insulin  Reviewed Problem List with Patient: Yes    Health Maintenance was reviewed today with patient. Discussed with patient importance of routine eye exams, foot exams/foot care, blood work (i.e.: A1c, microalbumin, and lipid), dental visits, yearly flu vaccine, and pneumonia vaccine as indicated by PCP. Patient verbalized understanding.     Health Maintenance Topics with due status: Not Due       Topic Last Completion Date    Hemoglobin A1c 03/23/2019    Influenza Vaccine Not Due     Health Maintenance Due   Topic Date Due    Lipid Panel  1981    Foot Exam  05/02/1991    Eye Exam  05/02/1991    Urine Microalbumin  05/02/1991    TETANUS VACCINE  05/02/1999    Pneumococcal Vaccine (Medium Risk) (1 of 1 - PPSV23) 05/02/2000    Pap Smear with HPV Cotest  05/02/2002       Nutrition  Meal Plan 24 Hour Recall - Breakfast: coffee w/ 2 creams, 2 sugars, 3 eggs   Meal Plan 24 Hour Recall - Lunch: salad, eggs, ham, carrots + [place] dressing w/ coke zero  Meal Plan 24 Hour Recall - Dinner: leftover hamburger, no bun - water   Meal Plan 24 Hour Recall - Snack: not really snacking     Monitoring   Self Monitoring : Before Breakfast: 188, 181, 110, 170, 155, 192 Before Lunch: 190, 225, 144, 118 Before Dinner: 155, 82, 162, 121 Bedtime: 164, 174, 162, 136   Blood Glucose Logs: Yes  Do you use a personal continuous glucose monitor?: No    Exercise   Exercise Type: (is working on passing PFT's and getting cleared by pulmonology and cardiology)  Frequency: Never    Current Diabetes Treatment   Current Treatment: Insulin                                                    Diabetes Education Assessment/Progress  Diabetes Disease Process (diabetes disease process and  treatment options): Not Covered/Deferred  Nutrition (Incorporating nutritional management into one's lifestyle): Discussion, Individual Session, Requires Assistance Family/SO(discussed nutritious snack options)  Physical Activity (incorporating physical activity into one's lifestyle): Discussion, Individual Session, Requires Assistance Family/SO(pt doing better w/ activity, not completely cleared yet by cardiology/pulmonology)  Medications (states correct name, dose, onset, peak, duration, side effects & timing of meds): Discussion, Individual Session, Requires Assistance Family/SO(pt doign very well w/ new insulin regime - continuing to titrate towards goals)  Monitoring (monitoring blood glucose/other parameters & using results): Discussion, Individual Session, Comprehends Key Points  Acute Complications (preventing, detecting, and treating acute complications): Not Covered/Deferred  Chronic Complications (preventing, detecting, and treating chronic complications): Not Covered/Deferred  Clinical (diabetes, other pertinent medical history, and relevant comorbidities reviewed during visit): Discussion, Individual Session, Requires Assistance Family/SO(discussed working on weight loss once BG at goal/tightening up diet and adding activity once cleared)  Cognitive (knowledge of self-management skills, functional health literacy): Not Covered/Deferred  Psychosocial (emotional response to diabetes): Not Covered/Deferred  Diabetes Distress and Support Systems: Not Covered/Deferred  Behavioral (readiness for change, lifestyle practices, self-care behaviors): Discussion, Individual Session, Requires Assistance Family/SO(pt doing very well making changes and adjustments to routine )    Goals  Patient has selected/evaluated goals during today's session: Yes, evaluated  Monitoring: In Progress  Medications: In Progress         Diabetes Care Plan/Intervention  Education Plan/Intervention: Individual Follow-Up DSMT    Diabetes  Meal Plan  Restrictions: Restricted Carbohydrate  Carbohydrate Per Meal: 30-45g    Today's Self-Management Care Plan was developed with the patient's input and is based on barriers identified during today's assessment.    The long and short-term goals in the care plan were written with the patient/caregiver's input. The patient has agreed to work toward these goals to improve her overall diabetes control.      The patient received a copy of today's self-management plan and verbalized understanding of the care plan, goals, and all of today's instructions.      The patient was encouraged to communicate with her physician and care team regarding her condition(s) and treatment.  I provided the patient with my contact information today and encouraged her to contact me via phone or patient portal as needed.     Education Units of Time   Time Spent: 60 min

## 2019-06-03 ENCOUNTER — TELEPHONE (OUTPATIENT)
Dept: CARDIOLOGY | Facility: CLINIC | Age: 39
End: 2019-06-03

## 2019-06-03 DIAGNOSIS — Z79.4 TYPE 2 DIABETES MELLITUS WITH COMPLICATION, WITH LONG-TERM CURRENT USE OF INSULIN: Primary | ICD-10-CM

## 2019-06-03 DIAGNOSIS — E11.8 TYPE 2 DIABETES MELLITUS WITH COMPLICATION, WITH LONG-TERM CURRENT USE OF INSULIN: Primary | ICD-10-CM

## 2019-06-03 NOTE — TELEPHONE ENCOUNTER
I tried reaching out to the patient regarding her disability paperwork; no answer; left voicemail to call back.

## 2019-06-04 ENCOUNTER — TELEPHONE (OUTPATIENT)
Dept: PULMONOLOGY | Facility: CLINIC | Age: 39
End: 2019-06-04

## 2019-06-04 NOTE — TELEPHONE ENCOUNTER
Pt called in stated that insurance would not pay for today's appointment and asked me to reschedule  her for next Tuesday the 12th.

## 2019-06-04 NOTE — TELEPHONE ENCOUNTER
Patient called back to discuss her short-term disability paper work.     Patient saw Dr. Mclaughlin on May 15, 2019. She brought in disability paperwork to be filled out and faxed to her employer. The paper work was filled out by both the patient and Dr. Mclaughlin, and faxed to her employer. The form was faxed back to us due to not having enough information regarding the patient's condition and progress, the wrong diagnosis, and the wrong date of most previous surgery. A new form was filled out and faxed over to Dr. Mclaughlin's main campus location on 5/15/19 to be completed and signed. I requested the paperwork to be faxed back so it can be sent to the patient's employer. I faxed over the paperwork a second time on 5/30/19, a third time on 6/4/19, as well as leaving two messages to Dr. Mclaughlin's office. I explained to the patient that Dr. Mclaughlin is out of town this week, and will return next week. Patient verbalized understanding; all questions answered; no other issues discussed.

## 2019-06-10 NOTE — ED PROVIDER NOTES
Encounter Date: 3/22/2019       History     Chief Complaint   Patient presents with    Shortness of Breath     SOB on exertion.  Pt reports resolves with rest.  Pt denies CP.     38-year-old female presents with 1 week of dyspnea on exertion.  She states when she walks she gets short of breath. She denies chest discomfort but at times has a burning feeling in her lungs and has tingling down her left arm.  She has not had a cough or hemoptysis.  She does take some unknown hormone when she has her periods but does not take hormones routinely.  She just finished her menstrual period now.  She denies leg pain or leg swelling. No recent long travel.          Review of patient's allergies indicates:  No Known Allergies  History reviewed. No pertinent past medical history.  Past Surgical History:   Procedure Laterality Date    FRACTURE SURGERY      TUBAL LIGATION       No family history on file.  Social History     Tobacco Use    Smoking status: Never Smoker    Smokeless tobacco: Never Used   Substance Use Topics    Alcohol use: Yes     Comment: socially    Drug use: No     Review of Systems   Constitutional: Negative for chills and fever.   HENT: Negative for congestion.    Respiratory: Positive for shortness of breath. Negative for cough.    Cardiovascular: Negative for chest pain and palpitations.   Gastrointestinal: Negative for abdominal pain.   Endocrine: Negative for polydipsia and polyuria.   Genitourinary: Negative for flank pain.   Musculoskeletal: Negative for arthralgias.   Neurological: Negative for dizziness.   Hematological: Negative for adenopathy.   Psychiatric/Behavioral: Negative for agitation.       Physical Exam     Initial Vitals [03/22/19 1439]   BP Pulse Resp Temp SpO2   (!) 156/96 82 20 97.3 °F (36.3 °C) 98 %      MAP       --         Physical Exam    Vitals reviewed.  Constitutional: She appears well-developed and well-nourished.   HENT:   Head: Normocephalic.   Eyes: Pupils are equal,  round, and reactive to light.   Neck: Normal range of motion. Neck supple. No JVD present.   Cardiovascular: Normal rate, regular rhythm and normal heart sounds. Exam reveals no gallop and no friction rub.    No murmur heard.  Pulmonary/Chest: Breath sounds normal. No respiratory distress. She has no wheezes. She has no rhonchi. She has no rales. She exhibits no tenderness.   Abdominal: Soft. Bowel sounds are normal. She exhibits no distension. There is no tenderness. There is no rebound and no guarding.   Musculoskeletal: Normal range of motion. She exhibits no edema or tenderness.   Neurological: She is alert. She has normal strength.   Skin: Skin is warm. Capillary refill takes less than 2 seconds. No rash noted.   Psychiatric: She has a normal mood and affect.         ED Course   Procedures  Labs Reviewed   CBC W/ AUTO DIFFERENTIAL - Abnormal; Notable for the following components:       Result Value    RBC 5.59 (*)     MCH 26.1 (*)     MCHC 31.3 (*)     All other components within normal limits    Narrative:     SHARED LAV   COMPREHENSIVE METABOLIC PANEL - Abnormal; Notable for the following components:    Sodium 133 (*)     Glucose 407 (*)     eGFR if non  57.9 (*)     All other components within normal limits    Narrative:     SHARED LAV   TROPONIN I - Abnormal; Notable for the following components:    Troponin I 0.030 (*)     All other components within normal limits    Narrative:     SHARED LAV   D DIMER, QUANTITATIVE - Abnormal; Notable for the following components:    D-Dimer 7.06 (*)     All other components within normal limits    Narrative:     SHARED LAV   CBC W/ AUTO DIFFERENTIAL - Abnormal; Notable for the following components:    MCH 26.2 (*)     MCHC 31.0 (*)     Immature Grans (Abs) 0.05 (*)     All other components within normal limits    Narrative:     (if patient is on warfarin prior to heparin therapy)   B-TYPE NATRIURETIC PEPTIDE    Narrative:     SHARED LAV   APTT     Narrative:     SHARED LAV   PROTIME-INR    Narrative:     SHARED LAV   APTT    Narrative:     (if patient is on warfarin prior to heparin therapy)   PROTIME-INR    Narrative:     (if patient is on warfarin prior to heparin therapy)   POCT URINE PREGNANCY   POCT GLUCOSE MONITORING CONTINUOUS     EKG Readings: (Independently Interpreted)   Sinus tachycardia 116 without acute ischemic changes     ECG Results          EKG 12-lead (Final result)  Result time 03/22/19 16:23:52    Final result by Interface, Lab In [facility] (03/22/19 16:23:52)                 Narrative:    Test Reason : R06.02,    Vent. Rate : 116 BPM     Atrial Rate : 116 BPM     P-R Int : 168 ms          QRS Dur : 070 ms      QT Int : 320 ms       P-R-T Axes : 067 061 043 degrees     QTc Int : 444 ms    Sinus tachycardia  Otherwise normal ECG  No previous ECGs available  Confirmed by OSMAN POTTS MD ([id number]) on 3/22/2019 4:23:45 PM    Referred By:             Confirmed By:OSMAN POTTS MD                            Imaging Results          CTA Chest Non-Coronary (PE Study) (Final result)     Abnormal  Result time 03/22/19 19:11:37    Final result by Ralph Ng MD (03/22/19 19:11:37)                 Impression:      This report was flagged in Epic as abnormal.    1. Diffuse bilateral pulmonary thromboembolism involving the distal right and left main pulmonary arteries, extending to multiple lobar, segmental, and subsegmental pulmonary arteries bilaterally.  2. No acute pulmonary parenchymal abnormality.  3. Possible hepatic steatosis, correlation with LFTs recommended.      Electronically signed by: Ralph Ng MD  Date:    03/22/2019  Time:    19:11             Narrative:    EXAMINATION:  CTA CHEST NON CORONARY    CLINICAL HISTORY:  Chest pain, acute, PE suspected, intermed prob, positive D-dimer;    TECHNIQUE:  Low dose axial images, sagittal and coronal reformations were obtained from the thoracic inlet to the lung bases following the IV  administration of 100 mL of Omnipaque 350.  Contrast timing was optimized to evaluate the pulmonary arteries.  MIP images were performed.    COMPARISON:  None    FINDINGS:  The structures at the base of the neck are grossly unremarkable.  No significant mediastinal lymphadenopathy noting scattered nonenlarged mediastinal lymph nodes.  The heart is not enlarged.  No pericardial effusion.  The thoracic aorta tapers normally.  The visualized portions of the kidneys, adrenal glands, spleen, pancreas, and gallbladder are grossly unremarkable.  There may be hepatic steatosis, correlation with LFTs recommended.    The airways are grossly patent allowing for motion artifact.  Evaluation of the pulmonary parenchyma is limited secondary to respiratory motion.  No pleural effusion.  No pneumothorax.  No large focal consolidation.    Bolus timing is adequate for the evaluation of pulmonary thromboembolism.  There are pulmonary arterial filling defects within the distal aspects of the left and right main pulmonary arteries, extending distally to several lobar and segmental/subsegmental branches bilaterally, noting there may be some sparing involving the left upper lobe.    No focal osseous destructive process.  No significant axillary lymphadenopathy.                               X-Ray Chest PA And Lateral (Final result)  Result time 03/22/19 16:31:50    Final result by Leonardo Simmons MD (03/22/19 16:31:50)                 Impression:      No active cardiopulmonary disease and no significant interval change      Electronically signed by: Leonardo Simmons MD  Date:    03/22/2019  Time:    16:31             Narrative:    EXAMINATION:  XR CHEST PA AND LATERAL    CLINICAL HISTORY:  shortness of breath;    TECHNIQUE:  PA and lateral views of the chest were performed.    COMPARISON:  09/24/2009    FINDINGS:  Heart size and pulmonary vascularity are within normal limits.  Lungs are satisfactorily expanded and appear free of active disease.   No pleural fluid or pneumothorax.  Soft tissues and osseous structures are unremarkable.                                 Medical Decision Making:   History:   Old Medical Records: I decided to obtain old medical records.  Old Records Summarized: other records.       <> Summary of Records: [facility]  Independently Interpreted Test(s):   I have ordered and independently interpreted EKG Reading(s) - see prior notes  Clinical Tests:   Lab Tests: Ordered and Reviewed  Radiological Study: Reviewed and Ordered  Medical Tests: Ordered and Reviewed  ED Management:  I reviewed medical records and labs.  She had an elevated D-dimer and troponin.  Her blood sugar was also elevated at 400 without signs of DKA.  I ordered IV fluids.  I ordered insulin.  I have been concerned about the patient and move her in line for her CT scan.  It was done emergently.     7:34 PM  CTA of chest revealed multiple pulmonary emboli.  On my independent interpretation, we saw a large PE in the left pulmonary artery.  Radiology interpretation was: Diffuse bilateral pulmonary thromboembolism involving the distal right and left main pulmonary arteries, extending to multiple lobar, segmental, and subsegmental pulmonary arteries bilaterally.  On repeat evaluation after CT patient was quite tachycardic at 160 and hyperventilating.  Patient was reassured and her tachypnea improved with oxygen and her tachycardia came down to 120.  I consulted Cardiology for emergent echo for right heart strain.  Placed on a heparin drip.              Attending Attestation:         Attending Critical Care:   Critical Care Times:   Direct Patient Care (initial evaluation, reassessments, and time considering the case)................................................................32 minutes.   Additional History from reviewing old medical records or taking additional history from the family, EMS, PCP, etc.......................2  minutes.   Ordering, Reviewing, and Interpreting Diagnostic Studies...............................................................................................................6 minutes.   Documentation..................................................................................................................................................................................6 minutes.   Consultation with other Physicians. .................................................................................................................................................10 minutes.   ==============================================================  · Total Critical Care Time - exclusive of procedural time: 56 minutes.  ==============================================================                 Clinical Impression:       ICD-10-CM ICD-9-CM   1. Other acute pulmonary embolism without acute cor pulmonale I26.99 415.19   2. Shortness of breath R06.02 786.05          Cardiology will take emergently to cath lab for catheter directed thrombolysis                      Wenceslao Brennan MD  03/22/19 2009     Normal

## 2019-06-11 ENCOUNTER — TELEPHONE (OUTPATIENT)
Dept: PULMONOLOGY | Facility: CLINIC | Age: 39
End: 2019-06-11

## 2019-06-11 ENCOUNTER — OFFICE VISIT (OUTPATIENT)
Dept: PULMONOLOGY | Facility: CLINIC | Age: 39
End: 2019-06-11
Payer: COMMERCIAL

## 2019-06-11 VITALS
HEART RATE: 78 BPM | DIASTOLIC BLOOD PRESSURE: 80 MMHG | WEIGHT: 288.5 LBS | SYSTOLIC BLOOD PRESSURE: 110 MMHG | OXYGEN SATURATION: 99 % | BODY MASS INDEX: 43.86 KG/M2

## 2019-06-11 DIAGNOSIS — I27.82 OTHER CHRONIC PULMONARY EMBOLISM WITH ACUTE COR PULMONALE: Primary | ICD-10-CM

## 2019-06-11 DIAGNOSIS — G47.33 OSA (OBSTRUCTIVE SLEEP APNEA): ICD-10-CM

## 2019-06-11 DIAGNOSIS — R06.09 DOE (DYSPNEA ON EXERTION): ICD-10-CM

## 2019-06-11 DIAGNOSIS — I26.09 OTHER CHRONIC PULMONARY EMBOLISM WITH ACUTE COR PULMONALE: Primary | ICD-10-CM

## 2019-06-11 PROCEDURE — 99204 OFFICE O/P NEW MOD 45 MIN: CPT | Mod: S$GLB,,, | Performed by: NURSE PRACTITIONER

## 2019-06-11 PROCEDURE — 99999 PR PBB SHADOW E&M-EST. PATIENT-LVL III: CPT | Mod: PBBFAC,,, | Performed by: NURSE PRACTITIONER

## 2019-06-11 PROCEDURE — 3008F PR BODY MASS INDEX (BMI) DOCUMENTED: ICD-10-PCS | Mod: CPTII,S$GLB,, | Performed by: NURSE PRACTITIONER

## 2019-06-11 PROCEDURE — 3008F BODY MASS INDEX DOCD: CPT | Mod: CPTII,S$GLB,, | Performed by: NURSE PRACTITIONER

## 2019-06-11 PROCEDURE — 99999 PR PBB SHADOW E&M-EST. PATIENT-LVL III: ICD-10-PCS | Mod: PBBFAC,,, | Performed by: NURSE PRACTITIONER

## 2019-06-11 PROCEDURE — 99204 PR OFFICE/OUTPT VISIT, NEW, LEVL IV, 45-59 MIN: ICD-10-PCS | Mod: S$GLB,,, | Performed by: NURSE PRACTITIONER

## 2019-06-11 NOTE — TELEPHONE ENCOUNTER
Patient called to receive an update on her disability paperwork. I informed her that her paperwork has been completed by her pulmonologist and has been faxed over to her employer. Fax confirmation email received. All questions answered; no other issues discussed.

## 2019-06-11 NOTE — LETTER
June 11, 2019      Tobias Mclaughlin MD  [address]  [facility] [address]           [facility]  [address]  [facility] [address]  Phone: [phone]  Fax: [phone]          Patient: Jose Hi   MR Number: [id number]   YOB: 1980   Date of Visit: 6/11/2019       Dear Dr. Tobias Mclaughlin:    Thank you for referring Jose Hi to me for evaluation. Attached you will find relevant portions of my assessment and plan of care.    If you have questions, please do not hesitate to call me. I look forward to following Jose Hi along with you.    Sincerely,    Yael Galan, DNP    Enclosure  CC:  No Recipients    If you would like to receive this communication electronically, please contact [email or web address] or [phone] to request more information on [facility] access.    For providers and/or their staff who would like to refer a patient to [facility], please contact us through our one-stop-shop provider referral line, [facility], [place], at [phone].    If you feel you have received this communication in error or would no longer like to receive these types of communications, please e-mail [email or web address]

## 2019-06-11 NOTE — PROGRESS NOTES
Subjective:       Patient ID: Jose Hi is a 39 y.o. female.    Chief Complaint: Shortness of Breath and Apnea    HPI:   Jose Hi is a 39 y.o. female who presents for evaluation of shortness of breath and dyspnea.  Ms. Hi is a never smoker who was hospitalized at [facility] March 22nd with  Pulmonary embolism and DVT.  She had been feeling poorly for several days prior to her admission.  She underwent a right heart cath with direct thromboembolic therapy.  She had begun OBCP prior to the onset of her symptoms so it was surmised that this was causative.  She was also found to have significant hyperglycemia and was started on insulin therapy.  Today she reports that she has improved greatly- she is walking in 10 minute intervals four times daily and is tolerating it well.  She is completing some household activities but does have difficulty completing any tasks that require bending over.  She has been on Eliquis 5 mg BID since discharge.    Review of Systems   Constitutional: Positive for activity change and fatigue. Negative for fever, chills, night sweats and weakness.   HENT: Negative for nosebleeds, sinus pressure and congestion.    Eyes: Negative for itching.   Respiratory: Positive for apnea, snoring, chest tightness, shortness of breath and dyspnea on extertion. Negative for cough, asthma nighttime symptoms and somnolence.    Cardiovascular: Positive for leg swelling. Negative for chest pain and palpitations.   Genitourinary: Negative for difficulty urinating.   Endocrine: Negative for cold intolerance and heat intolerance.    Musculoskeletal: Negative for arthralgias.   Gastrointestinal: Negative for nausea, vomiting and acid reflux.   Neurological: Negative for dizziness and light-headedness.   Hematological: Negative for adenopathy.   Psychiatric/Behavioral: Positive for sleep disturbance.         Social History     Tobacco Use    Smoking status: Never Smoker    Smokeless tobacco: Never Used  "  Substance Use Topics    Alcohol use: Yes     Comment: socially       Review of patient's allergies indicates:  No Known Allergies  No past medical history on file.  Past Surgical History:   Procedure Laterality Date    EKOS, Pumoart/DVT  3/22/2019    Performed by [facility] at [facility] CATH LAB    FRACTURE SURGERY      INSERTION, CATHETER, RIGHT HEART N/A 3/22/2019    Performed by [facility] at [facility] CATH LAB    TUBAL LIGATION       Current Outpatient Medications on File Prior to Visit   Medication Sig    apixaban (ELIQUIS) 5 mg Tab Take 1 tablet (5 mg total) by mouth 2 (two) times daily.    blood sugar diagnostic Strp Test 4 (four) times daily.    blood sugar diagnostic Strp 1 each by Misc.(Non-Drug; Combo Route) route 3 (three) times daily.    blood-glucose meter kit Use as instructed    insulin detemir U-100 (LEVEMIR FLEXTOUCH U-100 INSULN) 100 unit/mL (3 mL) SubQ InPn pen 40 units    insulin detemir U-100 (LEVEMIR FLEXTOUCH) 100 unit/mL (3 mL) SubQ InPn pen Inject 20 Units into the skin once daily.    lancets (ACCU-CHEK MULTICLIX LANCET) Misc 1 each by Misc.(Non-Drug; Combo Route) route 3 (three) times daily.    NOVOLOG FLEXPEN U-100 INSULIN 100 unit/mL (3 mL) InPn pen INJECT 10 UNITS SC WITH BIGGEST MEAL OF THE DAY    pen needle, diabetic 32 gauge x 5/32" Ndle Use once daily     No current facility-administered medications on file prior to visit.        Objective:      Vitals:    06/11/19 1035   BP: 110/80   Pulse: 78     Physical Exam   Constitutional: She is oriented to person, place, and time. She appears well-developed and well-nourished. No distress.   HENT:   Head: Normocephalic.   Cardiovascular: Normal rate and regular rhythm.   Pulmonary/Chest: Normal expansion and effort normal. No respiratory distress. She has no decreased breath sounds. She has no wheezes. She has no rhonchi.   Abdominal: Soft. She exhibits no distension. There is no hepatosplenomegaly. There is " no tenderness.   Musculoskeletal: Normal range of motion. Edema: trace pedal.   Lymphadenopathy:     She has no cervical adenopathy.   Neurological: She is alert and oriented to person, place, and time. Gait normal.   Skin: Skin is warm and dry. No cyanosis. Nails show no clubbing.   Psychiatric: She has a normal mood and affect.     Personal Diagnostic Review    Pulmonary stress, Echo and CT reviewed    Assessment:     Problem List Items Addressed This Visit        Hematology    Pulmonary embolism with acute cor pulmonale - Primary    Current Assessment & Plan     Continue Eliquis 5 mg BID as now.  Plan to repeat imaging prior to next clinic visit. Suspect that her continued symptoms are due to residual clot and deconditioning that has resulted from course of illness.  She is walking several times daily and does not require oxygen.  This is reassuring.           Relevant Orders    CT Chest W Wo Contrast    CV Ultrasound doppler venous legs bilat       Other    NABILA (obstructive sleep apnea)    Overview     Sleep study referral pending         Current Assessment & Plan     Must follow up with sleep medicine.           MONZON (dyspnea on exertion)    Overview     Tolerating 10 minute walking sessions QID         Current Assessment & Plan     6MWT showed no desaturation.  Recommend patient continue walking in an effort to build up her exercise tolerance. As many flights of stairs are required at her job in a [place] I would not recommend challenging herself in thsi time.  She will likely require anticoagulation therapy for another 4-5 months before I would consider discontinuing.              RTC 3 months with repeat imaging.

## 2019-06-11 NOTE — ASSESSMENT & PLAN NOTE
Continue Eliquis 5 mg BID as now.  Plan to repeat imaging prior to next clinic visit. Suspect that her continued symptoms are due to residual clot and deconditioning that has resulted from course of illness.  She is walking several times daily and does not require oxygen.  This is reassuring.

## 2019-06-11 NOTE — ASSESSMENT & PLAN NOTE
6MWT showed no desaturation.  Recommend patient continue walking in an effort to build up her exercise tolerance. As many flights of stairs are required at her job in a [place] I would not recommend challenging herself in thsi time.  She will likely require anticoagulation therapy for another 4-5 months before I would consider discontinuing.

## 2019-06-13 ENCOUNTER — TELEPHONE (OUTPATIENT)
Dept: SURGERY | Facility: CLINIC | Age: 39
End: 2019-06-13

## 2019-06-13 NOTE — TELEPHONE ENCOUNTER
----- Message from Anabell Zarco sent at 6/13/2019  9:45 AM CDT -----  Contact: self  Type: Needs Medical Advice    Who Called:  self  Symptoms (please be specific):    How long has patient had these symptoms:    Pharmacy name and phone #:    Best Call Back Number: [phone]  Additional Information: Patient would like a call back regarding her paperwork due to her hospitalization. She states the insurance has not received it yet. Please call call patient.  Thanks!

## 2019-06-28 ENCOUNTER — HOSPITAL ENCOUNTER (OUTPATIENT)
Dept: DIABETES | Facility: OTHER | Age: 39
Discharge: HOME | End: 2019-06-28
Attending: FAMILY MEDICINE
Payer: COMMERCIAL

## 2019-06-28 VITALS — HEIGHT: 69 IN | WEIGHT: 287.94 LBS | BODY MASS INDEX: 42.65 KG/M2

## 2019-06-28 DIAGNOSIS — E11.9 NEW ONSET TYPE 2 DIABETES MELLITUS: Primary | ICD-10-CM

## 2019-06-28 PROCEDURE — G0108 DIAB MANAGE TRN  PER INDIV: HCPCS

## 2019-06-28 NOTE — PROGRESS NOTES
Diabetes Education  Author: Ling Guzman RN  Date: 6/28/2019    Diabetes Care Management Summary  Diabetes Education Record Assessment/Progress: Comprehensive/Group         Diabetes Type  Diabetes Type : Type II    Diabetes History  Current Treatment: Oral Medication, Insulin  Reviewed Problem List with Patient: Yes    Health Maintenance was reviewed today with patient. Discussed with patient importance of routine eye exams, foot exams/foot care, blood work (i.e.: A1c, microalbumin, and lipid), dental visits, yearly flu vaccine, and pneumonia vaccine as indicated by PCP. Patient verbalized understanding.     Health Maintenance Topics with due status: Not Due       Topic Last Completion Date    Hemoglobin A1c 05/15/2019    Influenza Vaccine Not Due     Health Maintenance Due   Topic Date Due    Lipid Panel  1980    Foot Exam  05/02/1991    Eye Exam  05/02/1991    Urine Microalbumin  05/02/1991    TETANUS VACCINE  05/02/1999    Pneumococcal Vaccine (Medium Risk) (1 of 1 - PPSV23) 05/02/2000    Pap Smear with HPV Cotest  05/02/2002            Monitoring   Self Monitoring : Before Breakfast: 188, 208, 209, 205, 149, 157, 176, 146, 223, 226, 192 Before Lunch: 138, 229, 132, 128, 188, 181, 137, 127, 175 Before Dinner: 167, 116, 97, 128, 114, 134 Bedtime: 125, 199, 242, 113, 219, 146, 174  Blood Glucose Logs: Yes  Do you use a personal continuous glucose monitor?: No         Current Diabetes Treatment   Current Treatment: Oral Medication, Insulin                                                    Diabetes Education Assessment/Progress  Diabetes Disease Process (diabetes disease process and treatment options): Not Covered/Deferred  Nutrition (Incorporating nutritional management into one's lifestyle): Not Covered/Deferred  Physical Activity (incorporating physical activity into one's lifestyle): Not Covered/Deferred  Medications (states correct name, dose, onset, peak, duration, side effects & timing of meds):  Discussion, Requires Assistance Family/SO, Individual Session(pt continuing to do well w/ MDI, some new struggles w/ going back to work - taking novolog consistently)  Monitoring (monitoring blood glucose/other parameters & using results): Discussion, Requires Assistance Family/SO, Individual Session(stress w/ going back to work and trying to keep up w/ testing - discussed dexcom CGM, CNM given to pt for PCP to fill out and pt to send insurance info to [employer] to get price check)  Acute Complications (preventing, detecting, and treating acute complications): Not Covered/Deferred  Chronic Complications (preventing, detecting, and treating chronic complications): Not Covered/Deferred  Clinical (diabetes, other pertinent medical history, and relevant comorbidities reviewed during visit): Not Covered/Deferred  Cognitive (knowledge of self-management skills, functional health literacy): Not Covered/Deferred  Psychosocial (emotional response to diabetes): Not Covered/Deferred  Diabetes Distress and Support Systems: Not Covered/Deferred  Behavioral (readiness for change, lifestyle practices, self-care behaviors): Discussion, Requires Assistance Family/SO, Individual Session(pt stressed w/ routine and work and trying to coordinate - discussed tech options to help make regime more manageable during busy work day)    Goals  Patient has selected/evaluated goals during today's session: Yes, evaluated  Monitoring: In Progress  Medications: In Progress         Diabetes Care Plan/Intervention  Education Plan/Intervention: Individual Follow-Up DSMT    Diabetes Meal Plan  Restrictions: Restricted Carbohydrate  Carbohydrate Per Meal: 30-45g    Today's Self-Management Care Plan was developed with the patient's input and is based on barriers identified during today's assessment.    The long and short-term goals in the care plan were written with the patient/caregiver's input. The patient has agreed to work toward these goals to improve  her overall diabetes control.      The patient received a copy of today's self-management plan and verbalized understanding of the care plan, goals, and all of today's instructions.      The patient was encouraged to communicate with her physician and care team regarding her condition(s) and treatment.  I provided the patient with my contact information today and encouraged her to contact me via phone or patient portal as needed.     Education Units of Time   Time Spent: 30 min

## 2019-07-31 ENCOUNTER — TELEPHONE (OUTPATIENT)
Dept: PULMONOLOGY | Facility: CLINIC | Age: 39
End: 2019-07-31

## 2019-07-31 NOTE — TELEPHONE ENCOUNTER
----- Message from Tahmina Coburn MA sent at 7/31/2019  4:11 PM CDT -----  Contact: Pt      ----- Message -----  From: Queenie Bai  Sent: 7/31/2019   1:24 PM  To: [facility] Staff    Pt called to speak to the nurse regarding her care and paperwork that was left with the nurse for her disability and would like a call back today asap [phone]

## 2019-08-01 NOTE — TELEPHONE ENCOUNTER
I called the disability office and spoke with Josselin. They received a letter from the patient's cardiologist stating she returned to work in June. The patient is now under the care of a pulmonologist and is currently not working, and will be re-evaluated upon her next visit in September, 2019. Josselin stated she sent a letter to the patient to have updated paperwork completed by the physician / pulmonologist. I spoke with the patient about the situation. She will bring in the paper work today to be completed on Tuesday when Yael is back in the office, then I will fax it back to the disability office. As of now, her case will remain open per my request while we fix the situation. Patient verbalized understanding; all questions answered; no other issues discussed.

## 2019-08-06 ENCOUNTER — TELEPHONE (OUTPATIENT)
Dept: SURGERY | Facility: CLINIC | Age: 39
End: 2019-08-06

## 2019-08-06 NOTE — TELEPHONE ENCOUNTER
Called to check on patient regarding disability paperwork. Patient stated she brought the paper work in this morning. I checked with all employee's in the office, but no one recalls anyone dropping off paper work. I gave the patient our fax number to fax over the paper work, but nothing has been faxed over. I followed up with her afterwards, but no answer; LVM. If we get the paper work, I will fax it over to Dr. Galan at [facility], she will fill it out, and she will fax it back to me.

## 2019-08-08 ENCOUNTER — HOSPITAL ENCOUNTER (OUTPATIENT)
Dept: DIABETES | Facility: OTHER | Age: 39
Discharge: HOME | End: 2019-08-08
Attending: FAMILY MEDICINE
Payer: COMMERCIAL

## 2019-08-08 VITALS — WEIGHT: 292.13 LBS | BODY MASS INDEX: 43.14 KG/M2

## 2019-08-08 DIAGNOSIS — E11.9 NEW ONSET TYPE 2 DIABETES MELLITUS: Primary | ICD-10-CM

## 2019-08-08 PROCEDURE — G0108 DIAB MANAGE TRN  PER INDIV: HCPCS

## 2019-08-08 RX ORDER — APIXABAN 5 MG/1
TABLET, FILM COATED ORAL
Refills: 5 | COMMUNITY
Start: 2019-07-23 | End: 2019-12-09 | Stop reason: SDUPTHER

## 2019-08-08 RX ORDER — BLOOD-GLUCOSE,RECEIVER,CONT
EACH MISCELLANEOUS
Refills: 0 | COMMUNITY
Start: 2019-07-24 | End: 2021-03-23

## 2019-08-08 RX ORDER — BLOOD-GLUCOSE TRANSMITTER
EACH MISCELLANEOUS
Refills: 3 | COMMUNITY
Start: 2019-07-23 | End: 2020-08-13 | Stop reason: SDUPTHER

## 2019-08-08 RX ORDER — BLOOD-GLUCOSE SENSOR
EACH MISCELLANEOUS
Refills: 0 | COMMUNITY
Start: 2019-07-23 | End: 2020-08-13 | Stop reason: SDUPTHER

## 2019-08-09 ENCOUNTER — TELEPHONE (OUTPATIENT)
Dept: SURGERY | Facility: CLINIC | Age: 39
End: 2019-08-09

## 2019-08-09 NOTE — TELEPHONE ENCOUNTER
----- Message from Mirlande Beltre sent at 8/9/2019  8:33 AM CDT -----  Type: Needs Medical Advice    Who Called:  self  Symptoms (please be specific):  Asking to speak to Mary  How long has patient had these symptoms:  Requesting an update on paperwork / asking if the fax was received ?   Pharmacy name and phone #:     Best Call Back Number: [phone] (home)     Additional Information:

## 2019-08-09 NOTE — TELEPHONE ENCOUNTER
Returned patient's call reference to request to speak with Akshat. Patient was advised the requested person to speak with is not currently available. Patient inquired as to if disability form for patient's job that was previously faxed to be filled out is completed. Patient was advised as soon as the requested information inquiry is available, patient will be notified as expeditiously as possible. No further issues were discussed.

## 2019-08-11 NOTE — PROGRESS NOTES
Diabetes Education  Author: Ling Guzman RN  Date: 8/11/2019    Diabetes Care Management Summary  Diabetes Education Record Assessment/Progress: Comprehensive/Group         Diabetes Type  Diabetes Type : Type II    Diabetes History  Current Treatment: Insulin  Reviewed Problem List with Patient: Yes    Health Maintenance was reviewed today with patient. Discussed with patient importance of routine eye exams, foot exams/foot care, blood work (i.e.: A1c, microalbumin, and lipid), dental visits, yearly flu vaccine, and pneumonia vaccine as indicated by PCP. Patient verbalized understanding.     Health Maintenance Topics with due status: Not Due       Topic Last Completion Date    Hemoglobin A1c 03/23/2019     Health Maintenance Due   Topic Date Due    Lipid Panel  1981    Foot Exam  05/02/1991    Eye Exam  05/02/1991    Urine Microalbumin  05/02/1991    TETANUS VACCINE  05/02/1999    Pneumococcal Vaccine (Medium Risk) (1 of 1 - PPSV23) 05/02/2000    Pap Smear with HPV Cotest  05/02/2002            Monitoring   Self Monitoring : DEXCOM DONE TODAY  Do you use a personal continuous glucose monitor?: Yes  What kind of glucose monitor do you use?: Dexcom         Current Diabetes Treatment   Current Treatment: Insulin                                                    Diabetes Education Assessment/Progress  Diabetes Disease Process (diabetes disease process and treatment options): Not Covered/Deferred  Nutrition (Incorporating nutritional management into one's lifestyle): Not Covered/Deferred  Physical Activity (incorporating physical activity into one's lifestyle): Discussion, Individual Session, Requires Assistance Family/SO(pt had accountability walking padma now and will be starting to walk 30min 5 days per week)  Medications (states correct name, dose, onset, peak, duration, side effects & timing of meds): Not Covered/Deferred  Monitoring (monitoring blood glucose/other parameters & using results):  Discussion, Individual Session, Requires Assistance Family/SO, Demonstration    DEXCOM CONTINUOUS GLUCOSE     Patient is here in clinic today for initial start of Dexcom continuous glucose monitoring system (CGMA). Patient inserted sensor on right abdomen and inserted transmitter. Time and date was set on monitor and settings were set. Hypoglycemia trigger set for 90 and hyperglycemia set for 180. Patient provided with phone number for 24-hour help line if needed. Discussed various types of possible alarms and what to do. Questions addressed. Initialization finished. No futher questions.      Acute Complications (preventing, detecting, and treating acute complications): Discussion, Individual Session, Requires Assistance Family/SO(ed on how to use the dexcom to prevent low BG)  Chronic Complications (preventing, detecting, and treating chronic complications): Discussion, Individual Session, Requires Assistance Family/SO(discussed importance of getting BG under better control to prevent further kidney damage)  Clinical (diabetes, other pertinent medical history, and relevant comorbidities reviewed during visit): Not Covered/Deferred  Cognitive (knowledge of self-management skills, functional health literacy): Not Covered/Deferred  Psychosocial (emotional response to diabetes): Not Covered/Deferred  Diabetes Distress and Support Systems: Not Covered/Deferred  Behavioral (readiness for change, lifestyle practices, self-care behaviors): Not Covered/Deferred    Goals  Patient has selected/evaluated goals during today's session: Yes, evaluated  Monitoring: In Progress  Medications: In Progress         Diabetes Care Plan/Intervention  Education Plan/Intervention: Individual Follow-Up DSMT    Diabetes Meal Plan  Restrictions: Restricted Carbohydrate  Carbohydrate Per Meal: 30-45g    Today's Self-Management Care Plan was developed with the patient's input and is based on barriers identified during today's assessment.    The  long and short-term goals in the care plan were written with the patient/caregiver's input. The patient has agreed to work toward these goals to improve her overall diabetes control.      The patient received a copy of today's self-management plan and verbalized understanding of the care plan, goals, and all of today's instructions.      The patient was encouraged to communicate with her physician and care team regarding her condition(s) and treatment.  I provided the patient with my contact information today and encouraged her to contact me via phone or patient portal as needed.     Education Units of Time   Time Spent: 60 min

## 2019-08-12 NOTE — TELEPHONE ENCOUNTER
Patient stated will try and come to the clinic to drop off new disability paper work before closing time.

## 2019-08-20 ENCOUNTER — TELEPHONE (OUTPATIENT)
Dept: PULMONOLOGY | Facility: CLINIC | Age: 39
End: 2019-08-20

## 2019-08-20 NOTE — TELEPHONE ENCOUNTER
----- Message from Thalia Beltre sent at 8/20/2019  4:53 PM CDT -----  Contact: Mary [facility] ext [phone]  .Needs Advice    Reason for call:        Communication Preference:phone    Additional Information:Mary would like to know if patient disability information was received and fax over please call ext [phone]

## 2019-08-21 ENCOUNTER — TELEPHONE (OUTPATIENT)
Dept: PULMONOLOGY | Facility: CLINIC | Age: 39
End: 2019-08-21

## 2019-08-21 DIAGNOSIS — I26.09 OTHER PULMONARY EMBOLISM WITH ACUTE COR PULMONALE, UNSPECIFIED CHRONICITY: Primary | ICD-10-CM

## 2019-08-21 NOTE — TELEPHONE ENCOUNTER
Spoke with Patricia from [facility], she advised me that KRISTEN Conley have some papers for patient to return back to work. I advised Patricia that I will check into it and contact her. Patricia verbalized that she undertsand.

## 2019-08-21 NOTE — TELEPHONE ENCOUNTER
Spoke with patient regarding her disability appeal. All necessary documents and signed letters have been faxed to Josselin in the disability office. I called the patient to inform her that all paper work has been faxed from this office and fax confirmation received. CT scan and CV US has also been scheduled for her to complete prior to her upcoming f/u appointment with Yael Galan. She is also aware of her appointment date, time, and location. Patient verbalized understanding; all questions answered; no other issues discussed.

## 2019-08-21 NOTE — TELEPHONE ENCOUNTER
----- Message from Ilsa Singletary sent at 8/21/2019  1:23 PM CDT -----  Contact:    Suzanne  Ext  [phone]  Needs Advice    Reason for call:        Communication Preference:   Phone     Additional Information:   Returning the nurse phone call in regards to pt disability papers

## 2019-08-22 ENCOUNTER — HOSPITAL ENCOUNTER (OUTPATIENT)
Dept: DIABETES | Facility: OTHER | Age: 39
Discharge: HOME | End: 2019-08-22
Attending: FAMILY MEDICINE
Payer: COMMERCIAL

## 2019-08-22 VITALS — WEIGHT: 293 LBS | BODY MASS INDEX: 44.41 KG/M2 | HEIGHT: 68 IN

## 2019-08-22 DIAGNOSIS — E11.9 NEW ONSET TYPE 2 DIABETES MELLITUS: Primary | ICD-10-CM

## 2019-08-22 PROCEDURE — G0108 DIAB MANAGE TRN  PER INDIV: HCPCS

## 2019-08-23 NOTE — PROGRESS NOTES
Diabetes Education  Author: Ling Guzman RN  Date: 8/23/2019    Diabetes Care Management Summary  Diabetes Education Record Assessment/Progress: Comprehensive/Group         Diabetes Type  Diabetes Type : Type II    Diabetes History  Current Treatment: Insulin  Reviewed Problem List with Patient: Yes    Health Maintenance was reviewed today with patient. Discussed with patient importance of routine eye exams, foot exams/foot care, blood work (i.e.: A1c, microalbumin, and lipid), dental visits, yearly flu vaccine, and pneumonia vaccine as indicated by PCP. Patient verbalized understanding.     Health Maintenance Topics with due status: Not Due       Topic Last Completion Date    Hemoglobin A1c 03/23/2019    Influenza Vaccine Not Due     Health Maintenance Due   Topic Date Due    Lipid Panel  1981    Foot Exam  05/02/1991    Eye Exam  05/02/1991    Urine Microalbumin  05/02/1991    TETANUS VACCINE  05/02/1999    Pneumococcal Vaccine (Medium Risk) (1 of 1 - PPSV23) 05/02/2000    Pap Smear with HPV Cotest  05/02/2002            Monitoring   Self Monitoring : dexcom downloaded today, report reviewed         Current Diabetes Treatment   Current Treatment: Insulin                                                    Diabetes Education Assessment/Progress  Diabetes Disease Process (diabetes disease process and treatment options): Not Covered/Deferred  Nutrition (Incorporating nutritional management into one's lifestyle): Discussion, Requires Assistance Family/SO, Individual Session(reviewed dexcom and meal recall - discussed impact of added sugar on BG PP, pt to work on reducing added sugar in diet, and limit cho servings to 30gm per meal - discussed importance of not eliminating cho (d/t pt low BG when only eating eggs))  Physical Activity (incorporating physical activity into one's lifestyle): Discussion, Requires Assistance Family/SO, Individual Session(pt has accountability partners to start exercising w/ her;  working on increasing endurance and stamina first - working up to 150min per week)  Medications (states correct name, dose, onset, peak, duration, side effects & timing of meds): Discussion, Comprehends Key Points, Individual Session(rec increasing levemir based on dexcom report )  Monitoring (monitoring blood glucose/other parameters & using results): Discussion, Requires Assistance Family/SO, Individual Session(pt loving new dexcom meter, is learning how to use arrows to predict BG and improve control)  Acute Complications (preventing, detecting, and treating acute complications): Discussion, Requires Assistance Family/SO, Individual Session(reviewed how to use arrows and alarms to prevent low BG and importance of quickly treating s/s of low BG)  Chronic Complications (preventing, detecting, and treating chronic complications): Not Covered/Deferred  Clinical (diabetes, other pertinent medical history, and relevant comorbidities reviewed during visit): Discussion, Requires Assistance Family/SO, Individual Session(discussed importance of balancing cho's and insulin to help w/ weight loss (in addition to exercise))  Cognitive (knowledge of self-management skills, functional health literacy): Not Covered/Deferred  Psychosocial (emotional response to diabetes): Not Covered/Deferred  Diabetes Distress and Support Systems: Not Covered/Deferred  Behavioral (readiness for change, lifestyle practices, self-care behaviors): Discussion, Requires Assistance Family/SO, Individual Session(pt is very motivated to continue using dexcom to obtain optimum control of BG)    Goals  Patient has selected/evaluated goals during today's session: Yes, evaluated  Monitoring: In Progress  Medications: In Progress         Diabetes Care Plan/Intervention  Education Plan/Intervention: Individual Follow-Up DSMT    Diabetes Meal Plan  Restrictions: Restricted Carbohydrate  Carbohydrate Per Meal: 30-45g    Today's Self-Management Care Plan was  developed with the patient's input and is based on barriers identified during today's assessment.    The long and short-term goals in the care plan were written with the patient/caregiver's input. The patient has agreed to work toward these goals to improve her overall diabetes control.      The patient received a copy of today's self-management plan and verbalized understanding of the care plan, goals, and all of today's instructions.      The patient was encouraged to communicate with her physician and care team regarding her condition(s) and treatment.  I provided the patient with my contact information today and encouraged her to contact me via phone or patient portal as needed.     Education Units of Time   Time Spent: 60 min

## 2019-09-03 ENCOUNTER — TELEPHONE (OUTPATIENT)
Dept: PULMONOLOGY | Facility: CLINIC | Age: 39
End: 2019-09-03

## 2019-09-03 NOTE — TELEPHONE ENCOUNTER
Spoke with patient. She is aware of her new appointment date of 09/17/19 at 2pm. Patient stated the new date and time works great. No other issues discussed.

## 2019-09-03 NOTE — TELEPHONE ENCOUNTER
Tried calling patient to reschedule her appointment with Dr. Galan in pulmonology due to her not being able to have her testing completed in time. 09/17/19 at 2 pm is available for reschedule.

## 2019-09-05 ENCOUNTER — HOSPITAL ENCOUNTER (OUTPATIENT)
Dept: DIABETES | Facility: OTHER | Age: 39
Discharge: HOME | End: 2019-09-05
Attending: FAMILY MEDICINE
Payer: COMMERCIAL

## 2019-09-05 ENCOUNTER — TELEPHONE (OUTPATIENT)
Dept: PULMONOLOGY | Facility: CLINIC | Age: 39
End: 2019-09-05

## 2019-09-05 VITALS — HEIGHT: 68 IN | WEIGHT: 287.25 LBS | BODY MASS INDEX: 43.53 KG/M2

## 2019-09-05 DIAGNOSIS — E11.9 NEW ONSET TYPE 2 DIABETES MELLITUS: Primary | ICD-10-CM

## 2019-09-05 PROCEDURE — G0108 DIAB MANAGE TRN  PER INDIV: HCPCS

## 2019-09-05 RX ORDER — PEN NEEDLE, DIABETIC 31 GX5/16"
NEEDLE, DISPOSABLE MISCELLANEOUS
Qty: 100 EACH | Refills: 0 | Status: SHIPPED | OUTPATIENT
Start: 2019-09-05 | End: 2019-11-25 | Stop reason: SDUPTHER

## 2019-09-05 NOTE — TELEPHONE ENCOUNTER
----- Message from Adrián Simental sent at 9/5/2019  3:55 PM CDT -----  Contact: patient   Please call pt at [phone]    Would like to speak to staff regarding her disability and changed appts    Thank you

## 2019-09-05 NOTE — TELEPHONE ENCOUNTER
A voicemail was left at 4:03pm, patient was informed that I was returning her call from [place] office from [facility] in the Pulmonary Department. Patient was left a call back number as well to return our call back at the office.

## 2019-09-08 NOTE — PROGRESS NOTES
Diabetes Education  Author: Ling Guzman RN  Date: 9/8/2019    Diabetes Care Management Summary  Diabetes Education Record Assessment/Progress: Comprehensive/Group         Diabetes Type  Diabetes Type : Type II    Diabetes History  Current Treatment: Insulin  Reviewed Problem List with Patient: Yes    Health Maintenance was reviewed today with patient. Discussed with patient importance of routine eye exams, foot exams/foot care, blood work (i.e.: A1c, microalbumin, and lipid), dental visits, yearly flu vaccine, and pneumonia vaccine as indicated by PCP. Patient verbalized understanding.     Health Maintenance Topics with due status: Not Due       Topic Last Completion Date    Hemoglobin A1c 03/23/2019     Health Maintenance Due   Topic Date Due    Lipid Panel  1981    Foot Exam  05/02/1991    Eye Exam  05/02/1991    Urine Microalbumin  05/02/1991    TETANUS VACCINE  05/02/1999    Pneumococcal Vaccine (Medium Risk) (1 of 1 - PPSV23) 05/02/2000    Pap Smear with HPV Cotest  05/02/2002       Nutrition  What type of sweetener do you use?: Splenda  Meal Plan 24 Hour Recall - Breakfast: egg white, 1 sausage miguel, 2 slices toast w/ peanut butter - coffee w/ splenda   Meal Plan 24 Hour Recall - Lunch: salad w/ tuna - 2 apple juice (for low BG)   Meal Plan 24 Hour Recall - Dinner: chicken breast, leftover nuggets, asparagus, macaroni -   Meal Plan 24 Hour Recall - Snack: peanut butter crackers     Monitoring   Self Monitoring : dexcom downloaded  Blood Glucose Logs: Yes  Do you use a personal continuous glucose monitor?: Yes         Current Diabetes Treatment   Current Treatment: Insulin                                                    Diabetes Education Assessment/Progress  Diabetes Disease Process (diabetes disease process and treatment options): Not Covered/Deferred  Nutrition (Incorporating nutritional management into one's lifestyle): Discussion, Individual Session, Requires Assistance  Family/SO(tremendous improvement w/ carb portion sizes as pt using dexcom to help guide )  Physical Activity (incorporating physical activity into one's lifestyle): Discussion, Individual Session, Requires Assistance Family/SO(pt has started walking regularly - daughter helps motivate)  Medications (states correct name, dose, onset, peak, duration, side effects & timing of meds): Discussion, Individual Session, Comprehends Key Points, Video(discussed potential need for weaning insulin if pt continues to lose weight/watches diet )  Monitoring (monitoring blood glucose/other parameters & using results): Discussion, Individual Session, Requires Assistance Family/SO(pt is loving the dexcom and ability to track progress)  Acute Complications (preventing, detecting, and treating acute complications): Discussion, Individual Session, Requires Assistance Family/SO(using the dexcom to prevent low BG - discussed appropriate snacks to eat before BG drops too low to help keep sugar stabilized)  Chronic Complications (preventing, detecting, and treating chronic complications): Not Covered/Deferred  Clinical (diabetes, other pertinent medical history, and relevant comorbidities reviewed during visit): Discussion, Requires Assistance Family/SO(pt has lost 8 pounds in past 2 weeks!!!)  Cognitive (knowledge of self-management skills, functional health literacy): Not Covered/Deferred  Psychosocial (emotional response to diabetes): Not Covered/Deferred  Diabetes Distress and Support Systems: Not Covered/Deferred  Behavioral (readiness for change, lifestyle practices, self-care behaviors): Not Covered/Deferred    Goals  Patient has selected/evaluated goals during today's session: Yes, evaluated  Monitoring: In Progress  Medications: In Progress         Diabetes Care Plan/Intervention  Education Plan/Intervention: Individual Follow-Up DSMT    Diabetes Meal Plan  Restrictions: Restricted Carbohydrate  Carbohydrate Per Meal:  30-45g    Today's Self-Management Care Plan was developed with the patient's input and is based on barriers identified during today's assessment.    The long and short-term goals in the care plan were written with the patient/caregiver's input. The patient has agreed to work toward these goals to improve her overall diabetes control.      The patient received a copy of today's self-management plan and verbalized understanding of the care plan, goals, and all of today's instructions.      The patient was encouraged to communicate with her physician and care team regarding her condition(s) and treatment.  I provided the patient with my contact information today and encouraged her to contact me via phone or patient portal as needed.     Education Units of Time   Time Spent: 60 min

## 2019-09-09 ENCOUNTER — TELEPHONE (OUTPATIENT)
Dept: SLEEP MEDICINE | Facility: OTHER | Age: 39
End: 2019-09-09

## 2019-09-10 ENCOUNTER — TELEPHONE (OUTPATIENT)
Dept: PULMONOLOGY | Facility: CLINIC | Age: 39
End: 2019-09-10

## 2019-09-10 NOTE — TELEPHONE ENCOUNTER
----- Message from Belnida Garza sent at 9/10/2019  2:14 PM CDT -----  Contact: Self [phone]  Joice PT     PT states her insurance will not cover her Ultrasounds at [facility]. PT has been rescheduled to [facility].

## 2019-09-12 ENCOUNTER — HOSPITAL ENCOUNTER (OUTPATIENT)
Dept: RADIOLOGY | Facility: HOSPITAL | Age: 39
Discharge: HOME | End: 2019-09-12
Attending: NURSE PRACTITIONER
Payer: COMMERCIAL

## 2019-09-12 DIAGNOSIS — I26.09 OTHER PULMONARY EMBOLISM WITH ACUTE COR PULMONALE, UNSPECIFIED CHRONICITY: ICD-10-CM

## 2019-09-12 DIAGNOSIS — I27.82 OTHER CHRONIC PULMONARY EMBOLISM WITH ACUTE COR PULMONALE: ICD-10-CM

## 2019-09-12 DIAGNOSIS — I26.09 OTHER CHRONIC PULMONARY EMBOLISM WITH ACUTE COR PULMONALE: ICD-10-CM

## 2019-09-12 LAB
CREAT SERPL-MCNC: 0.8 MG/DL (ref 0.5–1.4)
SAMPLE: NORMAL

## 2019-09-12 PROCEDURE — 93970 US LOWER EXTREMITY VEINS BILATERAL: ICD-10-PCS | Mod: 26,,, | Performed by: RADIOLOGY

## 2019-09-12 PROCEDURE — 93970 EXTREMITY STUDY: CPT | Mod: TC

## 2019-09-12 PROCEDURE — 71275 CT ANGIOGRAPHY CHEST: CPT | Mod: 26,,, | Performed by: RADIOLOGY

## 2019-09-12 PROCEDURE — 93970 EXTREMITY STUDY: CPT | Mod: 26,,, | Performed by: RADIOLOGY

## 2019-09-12 PROCEDURE — 25500020 PHARM REV CODE 255: Performed by: NURSE PRACTITIONER

## 2019-09-12 PROCEDURE — 71275 CTA CHEST NON CORONARY: ICD-10-PCS | Mod: 26,,, | Performed by: RADIOLOGY

## 2019-09-12 PROCEDURE — 71275 CT ANGIOGRAPHY CHEST: CPT | Mod: TC

## 2019-09-12 RX ADMIN — IOHEXOL 100 ML: 350 INJECTION, SOLUTION INTRAVENOUS at 06:09

## 2019-09-17 ENCOUNTER — OFFICE VISIT (OUTPATIENT)
Dept: PULMONOLOGY | Facility: CLINIC | Age: 39
End: 2019-09-17
Payer: COMMERCIAL

## 2019-09-17 VITALS
DIASTOLIC BLOOD PRESSURE: 81 MMHG | SYSTOLIC BLOOD PRESSURE: 120 MMHG | BODY MASS INDEX: 43.74 KG/M2 | WEIGHT: 287.69 LBS | HEART RATE: 65 BPM | RESPIRATION RATE: 16 BRPM

## 2019-09-17 DIAGNOSIS — I26.09 OTHER ACUTE PULMONARY EMBOLISM WITH ACUTE COR PULMONALE: Primary | ICD-10-CM

## 2019-09-17 DIAGNOSIS — I82.411 DVT FEMORAL (DEEP VENOUS THROMBOSIS) WITH THROMBOPHLEBITIS, RIGHT: ICD-10-CM

## 2019-09-17 PROCEDURE — 99213 PR OFFICE/OUTPT VISIT, EST, LEVL III, 20-29 MIN: ICD-10-PCS | Mod: S$GLB,,, | Performed by: NURSE PRACTITIONER

## 2019-09-17 PROCEDURE — 3008F PR BODY MASS INDEX (BMI) DOCUMENTED: ICD-10-PCS | Mod: CPTII,S$GLB,, | Performed by: NURSE PRACTITIONER

## 2019-09-17 PROCEDURE — 99999 PR PBB SHADOW E&M-EST. PATIENT-LVL III: CPT | Mod: PBBFAC,,, | Performed by: NURSE PRACTITIONER

## 2019-09-17 PROCEDURE — 99213 OFFICE O/P EST LOW 20 MIN: CPT | Mod: S$GLB,,, | Performed by: NURSE PRACTITIONER

## 2019-09-17 PROCEDURE — 3008F BODY MASS INDEX DOCD: CPT | Mod: CPTII,S$GLB,, | Performed by: NURSE PRACTITIONER

## 2019-09-17 PROCEDURE — 99999 PR PBB SHADOW E&M-EST. PATIENT-LVL III: ICD-10-PCS | Mod: PBBFAC,,, | Performed by: NURSE PRACTITIONER

## 2019-09-17 RX ORDER — LISINOPRIL 2.5 MG/1
TABLET ORAL
Refills: 6 | COMMUNITY
Start: 2019-09-05 | End: 2020-03-12 | Stop reason: SDUPTHER

## 2019-09-17 RX ORDER — SIMVASTATIN 5 MG/1
TABLET, FILM COATED ORAL
Refills: 6 | COMMUNITY
Start: 2019-09-05 | End: 2020-03-12 | Stop reason: SDUPTHER

## 2019-09-17 NOTE — ASSESSMENT & PLAN NOTE
Check D-Dimer in 4 weeks.  If negative will discontinue Eliquis. Will likely be able to return to work at this time. Recommend continued daily walking as tolerated.

## 2019-09-17 NOTE — PROGRESS NOTES
Subjective:       Patient ID: Jose Hi is a 39 y.o. female.    Chief Complaint: Results    HPI:   Jose Hi is a 39 y.o. female who presents with follow up after repeat CT and Venous US with doppler.  Patient had a provoked DVT/PE in March after starting OCP.  She is doing much better today and is followed closely by the Diabetes team at [facility].  She had a recent CT chest with contrast which showed an improvement in the clots which had been previously noted.  She had a negative venous doppler. She is walking 40 minutes daily but finds that she is easily exhausted.     Review of Systems   Constitutional: Positive for fatigue. Negative for chills, activity change and night sweats.   HENT: Negative for postnasal drip, rhinorrhea, trouble swallowing and congestion.    Eyes: Negative for itching.   Respiratory: Negative for cough, hemoptysis, sputum production, choking, chest tightness, shortness of breath, wheezing, dyspnea on extertion and use of rescue inhaler.    Cardiovascular: Negative for chest pain and palpitations.   Genitourinary: Negative for difficulty urinating.   Endocrine: Negative for cold intolerance and heat intolerance.    Musculoskeletal: Negative for arthralgias.   Skin: Negative for rash.   Gastrointestinal: Negative for nausea, vomiting and acid reflux.   Neurological: Negative for dizziness and light-headedness.   Hematological: Negative for adenopathy.   Psychiatric/Behavioral: Negative for sleep disturbance.         Social History     Tobacco Use    Smoking status: Never Smoker    Smokeless tobacco: Never Used   Substance Use Topics    Alcohol use: Yes     Comment: socially       Review of patient's allergies indicates:  No Known Allergies  No past medical history on file.  Past Surgical History:   Procedure Laterality Date    EKOS, Pumoart/DVT  3/22/2019    [facility] C[facility]    FRACTURE SURGERY      INSERTION, CATHETER, RIGHT HEART N/A  "3/22/2019    Performed by Andres Cates MD at [facility] CATH LAB    TUBAL LIGATION       Current Outpatient Medications on File Prior to Visit   Medication Sig    BD ULTRA-FINE LENAA PEN NEEDLE 32 gauge x 5/32" Ndle USE EVERDAY AS DIRECTED    blood sugar diagnostic Strp Test 4 (four) times daily.    blood-glucose meter kit Use as instructed    DEXCOM G6 SENSOR Dory USE 1 SENSOR EVERY 10 DAYS    DEXCOM G6 TRANSMITTER Dory USE 1 EVERY 3 MONTHS AS DIRECTED    ELIQUIS 5 mg Tab TK 1 T PO BID    insulin detemir U-100 (LEVEMIR FLEXTOUCH) 100 unit/mL (3 mL) SubQ InPn pen Inject 20 Units into the skin once daily.    lancets (ACCU-CHEK MULTICLIX LANCET) Misc 1 each by Misc.(Non-Drug; Combo Route) route 3 (three) times daily.    NOVOLOG FLEXPEN U-100 INSULIN 100 unit/mL (3 mL) InPn pen INJECT 10 UNITS SC WITH BIGGEST MEAL OF THE DAY    DEXCOM G6  Misc USE  D UTD    insulin detemir U-100 (LEVEMIR FLEXTOUCH U-100 INSULN) 100 unit/mL (3 mL) SubQ InPn pen 40 units    lisinopril (PRINIVIL,ZESTRIL) 2.5 MG tablet TK 1 T  PO QPM FOR KIDNEY PROTECTION    simvastatin (ZOCOR) 5 MG tablet TK 1 T PO QPM FOR HEART PROTECTION FOR 30 DAYS     No current facility-administered medications on file prior to visit.        Objective:      Vitals:    09/17/19 1437   BP: 120/81   Pulse: 65   Resp: 16     Physical Exam   Constitutional: She is oriented to person, place, and time. She appears well-developed and well-nourished. No distress.   HENT:   Head: Normocephalic.   Neck: Normal range of motion. Neck supple.   Cardiovascular: Normal rate and regular rhythm.   No murmur heard.  Pulmonary/Chest: Normal expansion, symmetric chest wall expansion, effort normal and breath sounds normal. No respiratory distress. She has no decreased breath sounds. She has no wheezes. She has no rhonchi. She has no rales.   Abdominal: Soft. She exhibits no distension. There is no hepatosplenomegaly. There is no tenderness.   Musculoskeletal: " Normal range of motion. She exhibits no edema.   Lymphadenopathy:     She has no cervical adenopathy.   Neurological: She is alert and oriented to person, place, and time. Gait normal.   Skin: Skin is warm and dry. No cyanosis. Nails show no clubbing.   Psychiatric: She has a normal mood and affect.   Vitals reviewed.    Personal Diagnostic Review    Imaging personally reviewed with patient CT with contrast 9/12/19, US 9/12/19        Assessment:     Problem List Items Addressed This Visit        Hematology    Pulmonary embolism with acute cor pulmonale - Primary    Overview     Noted on CTA in 3/2019.  Patient has been on Eliquis 5 mg BID since. Thought to have been provoked by OCP.         Current Assessment & Plan     Check D-Dimer in 4 weeks.  If negative will discontinue Eliquis. Will likely be able to return to work at this time. Recommend continued daily walking as tolerated.          Relevant Orders    D-DIMER, QUANTITATIVE    DVT femoral (deep venous thrombosis) with thrombophlebitis, right    Overview     Noted in March 2019         Current Assessment & Plan     No evidence of continued DVT on 9/12/19 US

## 2019-09-17 NOTE — PATIENT INSTRUCTIONS
Repeat D-dimer in 4 weeks, this test will tell us if you still have an activated clotting system. Once we have these results we can discuss stopping your Eliquis.   We will continue your Eliquis for now as you have been taking it  Keep walking as much as you can, start gradually increasing your activity as you can tolerate it

## 2019-09-19 ENCOUNTER — TELEPHONE (OUTPATIENT)
Dept: PULMONOLOGY | Facility: CLINIC | Age: 39
End: 2019-09-19

## 2019-09-19 NOTE — TELEPHONE ENCOUNTER
----- Message from Gabi Rojas sent at 9/19/2019 12:39 PM CDT -----  Contact: Anabell  Attn: Mary Hung disability worker for patient , called in stated she's returning your call about paper work             Anabell Call back number: [phone]

## 2019-09-23 ENCOUNTER — TELEPHONE (OUTPATIENT)
Dept: PULMONOLOGY | Facility: CLINIC | Age: 39
End: 2019-09-23

## 2019-09-23 NOTE — TELEPHONE ENCOUNTER
Called to inform patient that the notes from her most recent office visit have been faxed over to [facility]. Fax confirmation received.

## 2019-09-30 ENCOUNTER — TELEPHONE (OUTPATIENT)
Dept: PULMONOLOGY | Facility: CLINIC | Age: 39
End: 2019-09-30

## 2019-09-30 ENCOUNTER — HOSPITAL ENCOUNTER (EMERGENCY)
Facility: HOSPITAL | Age: 39
Discharge: HOME | End: 2019-09-30
Attending: EMERGENCY MEDICINE
Payer: COMMERCIAL

## 2019-09-30 ENCOUNTER — TELEPHONE (OUTPATIENT)
Dept: FAMILY MEDICINE | Facility: CLINIC | Age: 39
End: 2019-09-30

## 2019-09-30 VITALS
RESPIRATION RATE: 18 BRPM | HEART RATE: 85 BPM | TEMPERATURE: 99 F | DIASTOLIC BLOOD PRESSURE: 83 MMHG | HEIGHT: 68 IN | OXYGEN SATURATION: 100 % | WEIGHT: 286 LBS | SYSTOLIC BLOOD PRESSURE: 121 MMHG | BODY MASS INDEX: 43.35 KG/M2

## 2019-09-30 DIAGNOSIS — J34.89 RHINORRHEA: Primary | ICD-10-CM

## 2019-09-30 DIAGNOSIS — R06.02 SOB (SHORTNESS OF BREATH): ICD-10-CM

## 2019-09-30 PROCEDURE — 93005 ELECTROCARDIOGRAM TRACING: CPT

## 2019-09-30 PROCEDURE — 93010 EKG 12-LEAD: ICD-10-PCS | Mod: ,,, | Performed by: INTERNAL MEDICINE

## 2019-09-30 PROCEDURE — 99283 EMERGENCY DEPT VISIT LOW MDM: CPT | Mod: 25

## 2019-09-30 PROCEDURE — 99284 PR EMERGENCY DEPT VISIT,LEVEL IV: ICD-10-PCS | Mod: ,,, | Performed by: EMERGENCY MEDICINE

## 2019-09-30 PROCEDURE — 93010 ELECTROCARDIOGRAM REPORT: CPT | Mod: ,,, | Performed by: INTERNAL MEDICINE

## 2019-09-30 PROCEDURE — 99284 EMERGENCY DEPT VISIT MOD MDM: CPT | Mod: ,,, | Performed by: EMERGENCY MEDICINE

## 2019-09-30 NOTE — TELEPHONE ENCOUNTER
Have patient take otc claritn or zyrtec for the next 3 days. Call back or send a portal message Thursday if symptoms not any better

## 2019-09-30 NOTE — PROVIDER PROGRESS NOTES - EMERGENCY DEPT.
Encounter Date: 9/30/2019    ED Physician Progress Notes         EKG - STEMI Decision  Initial Reading: No STEMI present.

## 2019-09-30 NOTE — TELEPHONE ENCOUNTER
Woke up this morning, felt like she had a nosebleed.  Not blood, watery yellow liquid.  On her pillow there's a big yellow stain.  Happened 3 times today, draining like a nosebleed episodes, no mucus.  No fever.  No other symptoms.  To dr lemus for review -DN

## 2019-09-30 NOTE — TELEPHONE ENCOUNTER
----- Message from Danielle Griffith sent at 9/30/2019 10:44 AM CDT -----  Pt has yellow mucus coming out of nose. She had what was like a nose bleed and it was yellow. Would like to know what to do.   [phone]

## 2019-10-01 NOTE — ED TRIAGE NOTES
Jose Hi, a 39 y.o. female presents to the ED with c/o runny nose starting this morning. She states her pulmonologist told her to come to the ER for evaluation. She has a hx of bilateral PE's from March 2019. She states clots are still present and taking eliquis appropriately at home. Denies any pain or SOB at this time.     Triage note:  Chief Complaint   Patient presents with    Shortness of Breath     +edema, runny nose. Hx of PE March.        Review of patient's allergies indicates:  No Known Allergies  Past Medical History:   Diagnosis Date    Diabetes     History of pulmonary embolus (PE)     March 2019       LOC: The patient is awake, alert, aware of environment with an appropriate affect. Oriented x3, speaking appropriately  APPEARANCE: Pt resting comfortably, in no acute distress, pt is clean and well groomed, clothing properly fastened  SKIN: Skin warm, dry and intact, normal skin turgor, moist mucus membranes  RESPIRATORY: Airway is open and patent, respirations are spontaneous, even and unlabored, normal effort and rate. +runny nose with clear mucous, non-productive cough  CARDIAC: Normal rate and rhythm, no peripheral edema noted, capillary refill < 3 seconds, bilateral radial pulses 2+  ABDOMEN: Soft, nontender, nondistended. Bowel sounds present   NEUROLOGIC: PERRL, facial expression is symmetrical, patient moving all extremities spontaneously, normal sensation in all extremities when touched with a finger.  Follows all commands appropriately  MUSCULOSKELETAL: No obvious deformities.

## 2019-10-01 NOTE — ED PROVIDER NOTES
"Encounter Date: 9/30/2019    SCRIBE #1 NOTE: I, Mindy Yu, am scribing for, and in the presence of,  Dr. Randall. I have scribed the entire note.       History     Chief Complaint   Patient presents with    Shortness of Breath     +edema, runny nose. Hx of PE March.      Time patient was seen by the provider: 7:42 PM      The patient is a 39 y.o. female who presents to the ED with a complaint of rhinorrhea. Patient was referred to ED by Dr. Galan with Pulmonology with concerns of leaking yellow fluids from her nose. She reports she woke up out of her sleep because she thought her nose was bleeding and found a gold spot on her pillow. She states she has had a couple of episodes today during which the fluids would leak uncontrollably from her nose. She states, "it's not mucous or blood, it's just watery and yellow." She is currently on eliquis for PE diagnosed in March 2019. Unchanged SOB since PE diagnosis. She denies headache or worsening SOB.    The history is provided by the patient and medical records.     Review of patient's allergies indicates:  No Known Allergies  Past Medical History:   Diagnosis Date    Diabetes     History of pulmonary embolus (PE)     March 2019     Past Surgical History:   Procedure Laterality Date    FRACTURE SURGERY      RIGHT HEART CATHETERIZATION N/A 3/22/2019    Procedure: INSERTION, CATHETER, RIGHT HEART;  Surgeon: Andres Cates MD;  Location: [facility] CATH LAB;  Service: Cardiology;  Laterality: N/A;    TUBAL LIGATION       No family history on file.  Social History     Tobacco Use    Smoking status: Never Smoker    Smokeless tobacco: Never Used   Substance Use Topics    Alcohol use: Yes     Comment: socially    Drug use: No     Review of Systems  General: No fever. No chills.  Eyes: No visual changes.  Head: No headache.   ENT: Rhinorrhea   Integument: No rashes or lesions.  Chest: No shortness of breath.  Cardiovascular: No chest pain.  Abdomen: No abdominal pain.  " No nausea or vomiting.  Urinary: No abnormal urination.  Neurologic: No focal weakness.  No numbness.  Hematologic: No easy bruising.  Endocrine: No excessive thirst or urination.   Physical Exam     Initial Vitals [09/30/19 1826]   BP Pulse Resp Temp SpO2   121/83 85 18 98.8 °F (37.1 °C) 100 %      MAP       --         Physical Exam    Nursing note and vitals reviewed.    Appearance: No acute distress.  Skin: No rashes seen.  Good turgor.  No abrasions.  No ecchymoses.  Eyes: No conjunctival injection.  ENT: Oropharynx clear.    Chest: Clear to auscultation bilaterally.  Good air movement.  No wheezes.  No rhonchi.  Cardiovascular: Regular rate and rhythm.  No murmurs. No gallops. No rubs.  Abdomen: Soft.  Not distended.  Nontender.  No guarding.  No rebound.  Musculoskeletal: Good range of motion all joints.  No deformities.  Neck supple.  No meningismus.  Neurologic: Motor intact.  Sensation intact.  Cerebellar intact.  Cranial nerves intact.  Mental Status:  Alert and oriented x 3.  Appropriate, conversant.     ED Course   Procedures  Labs Reviewed - No data to display       Imaging Results    None          Medical Decision Making:   History:   Old Medical Records: I decided to obtain old medical records.  Clinical Tests:   Medical Tests: Ordered and Reviewed    Yellow fluid sporadically coming out of the nose, small amounts.  I see nothing concerning in both nares.  Doubt CSF leak as no history of issues, no headache/fever/stiff neck, yellowish fluid.  Likely rhinorrhea.  No other symptoms, no indication for antibiotics.  F/U PCP.             Scribe Attestation:   Scribe #1: I performed the above scribed service and the documentation accurately describes the services I performed. I attest to the accuracy of the note.               Clinical Impression:       ICD-10-CM ICD-9-CM   1. Rhinorrhea J34.89 478.19   2. SOB (shortness of breath) R06.02 786.05         Disposition:   Disposition: Discharged  Condition:  Stable                        Braxton Randall MD  10/01/19 0009

## 2019-10-07 ENCOUNTER — LAB VISIT (OUTPATIENT)
Dept: LAB | Facility: HOSPITAL | Age: 39
End: 2019-10-07
Payer: COMMERCIAL

## 2019-10-07 ENCOUNTER — TELEPHONE (OUTPATIENT)
Dept: PULMONOLOGY | Facility: CLINIC | Age: 39
End: 2019-10-07

## 2019-10-07 DIAGNOSIS — I26.09 OTHER ACUTE PULMONARY EMBOLISM WITH ACUTE COR PULMONALE: ICD-10-CM

## 2019-10-07 LAB — D DIMER PPP IA.FEU-MCNC: 0.51 MG/L FEU

## 2019-10-07 PROCEDURE — 85379 FIBRIN DEGRADATION QUANT: CPT

## 2019-10-07 PROCEDURE — 36415 COLL VENOUS BLD VENIPUNCTURE: CPT

## 2019-10-11 ENCOUNTER — TELEPHONE (OUTPATIENT)
Dept: PULMONOLOGY | Facility: CLINIC | Age: 39
End: 2019-10-11

## 2019-10-11 NOTE — TELEPHONE ENCOUNTER
Uncle passed away recently, grandmother had a clotting disorder. Still having nasal drainage at times.  No shortness of breath, fever, chills or concerning symptoms.

## 2019-10-15 ENCOUNTER — OFFICE VISIT (OUTPATIENT)
Dept: PULMONOLOGY | Facility: CLINIC | Age: 39
End: 2019-10-15
Payer: COMMERCIAL

## 2019-10-15 VITALS
DIASTOLIC BLOOD PRESSURE: 78 MMHG | OXYGEN SATURATION: 96 % | SYSTOLIC BLOOD PRESSURE: 128 MMHG | RESPIRATION RATE: 16 BRPM | BODY MASS INDEX: 43.58 KG/M2 | WEIGHT: 286.63 LBS | HEART RATE: 73 BPM

## 2019-10-15 DIAGNOSIS — I26.99 OTHER PULMONARY EMBOLISM WITHOUT ACUTE COR PULMONALE, UNSPECIFIED CHRONICITY: Primary | ICD-10-CM

## 2019-10-15 DIAGNOSIS — I26.09 OTHER CHRONIC PULMONARY EMBOLISM WITH ACUTE COR PULMONALE: ICD-10-CM

## 2019-10-15 DIAGNOSIS — I27.82 OTHER CHRONIC PULMONARY EMBOLISM WITH ACUTE COR PULMONALE: ICD-10-CM

## 2019-10-15 PROCEDURE — 99999 PR PBB SHADOW E&M-EST. PATIENT-LVL III: ICD-10-PCS | Mod: PBBFAC,,, | Performed by: NURSE PRACTITIONER

## 2019-10-15 PROCEDURE — 3008F PR BODY MASS INDEX (BMI) DOCUMENTED: ICD-10-PCS | Mod: CPTII,S$GLB,, | Performed by: NURSE PRACTITIONER

## 2019-10-15 PROCEDURE — 3008F BODY MASS INDEX DOCD: CPT | Mod: CPTII,S$GLB,, | Performed by: NURSE PRACTITIONER

## 2019-10-15 PROCEDURE — 99999 PR PBB SHADOW E&M-EST. PATIENT-LVL III: CPT | Mod: PBBFAC,,, | Performed by: NURSE PRACTITIONER

## 2019-10-15 PROCEDURE — 99213 OFFICE O/P EST LOW 20 MIN: CPT | Mod: S$GLB,,, | Performed by: NURSE PRACTITIONER

## 2019-10-15 PROCEDURE — 99213 PR OFFICE/OUTPT VISIT, EST, LEVL III, 20-29 MIN: ICD-10-PCS | Mod: S$GLB,,, | Performed by: NURSE PRACTITIONER

## 2019-10-15 NOTE — ASSESSMENT & PLAN NOTE
D-dimer 0.51- still positive although it is greatly improved.  Recheck in 2 weeks.  Ok for her to return to work on October 26th with light duty.

## 2019-10-15 NOTE — PROGRESS NOTES
Subjective:       Patient ID: Jose Hi is a 39 y.o. female.    Chief Complaint: Follow up PE  HPI:   Jose Hi is a 39 y.o. female who presents with follow up to evaluate lab work.  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  Was very similar to a regular work day.  Then sandra to a volleyball game, then a basketball game. Had a lot of issues with exhaustion the next day.  Feeling better overall but still having difficulty when she ramps up her activity.     Review of Systems   Constitutional: Positive for fatigue. Negative for chills, activity change and night sweats.   HENT: Negative for postnasal drip, rhinorrhea, trouble swallowing and congestion.    Eyes: Negative for itching.   Respiratory: Positive for dyspnea on extertion (slight). Negative for cough, hemoptysis, sputum production, choking, chest tightness, shortness of breath, wheezing and use of rescue inhaler.    Cardiovascular: Negative for chest pain and palpitations.   Genitourinary: Negative for difficulty urinating.   Endocrine: Negative for cold intolerance and heat intolerance.    Musculoskeletal: Negative for arthralgias.   Skin: Negative for rash.   Gastrointestinal: Negative for nausea, vomiting and acid reflux.   Neurological: Negative for dizziness and light-headedness.   Hematological: Negative for adenopathy.   Psychiatric/Behavioral: Negative for sleep disturbance.         Social History     Tobacco Use    Smoking status: Never Smoker    Smokeless tobacco: Never Used   Substance Use Topics    Alcohol use: Yes     Comment: socially       Review of patient's allergies indicates:  No Known Allergies  Past Medical History:   Diagnosis Date    Diabetes     History of pulmonary embolus (PE)     March 2019     Past Surgical History:   Procedure Laterality Date    FRACTURE SURGERY      RIGHT HEART CATHETERIZATION N/A 3/22/2019    Procedure: INSERTION, CATHETER, RIGHT HEART;  Surgeon: Andres  "Lauro Catse MD;  Location: [facility] CATH LAB;  Service: Cardiology;  Laterality: N/A;    TUBAL LIGATION       Current Outpatient Medications on File Prior to Visit   Medication Sig    BD ULTRA-FINE LEANA PEN NEEDLE 32 gauge x 5/32" Ndle USE EVERDAY AS DIRECTED    blood sugar diagnostic Strp Test 4 (four) times daily.    blood-glucose meter kit Use as instructed    DEXCOM G6  Misc USE  D UTD    DEXCOM G6 SENSOR Dory USE 1 SENSOR EVERY 10 DAYS    DEXCOM G6 TRANSMITTER Dory USE 1 EVERY 3 MONTHS AS DIRECTED    ELIQUIS 5 mg Tab TK 1 T PO BID    insulin detemir U-100 (LEVEMIR FLEXTOUCH U-100 INSULN) 100 unit/mL (3 mL) SubQ InPn pen 40 units    insulin detemir U-100 (LEVEMIR FLEXTOUCH) 100 unit/mL (3 mL) SubQ InPn pen Inject 20 Units into the skin once daily.    lancets (ACCU-CHEK MULTICLIX LANCET) Misc 1 each by Misc.(Non-Drug; Combo Route) route 3 (three) times daily.    lisinopril (PRINIVIL,ZESTRIL) 2.5 MG tablet TK 1 T  PO QPM FOR KIDNEY PROTECTION    NOVOLOG FLEXPEN U-100 INSULIN 100 unit/mL (3 mL) InPn pen INJECT 10 UNITS SC WITH BIGGEST MEAL OF THE DAY    simvastatin (ZOCOR) 5 MG tablet TK 1 T PO QPM FOR HEART PROTECTION FOR 30 DAYS     No current facility-administered medications on file prior to visit.        Objective:      There were no vitals filed for this visit.  Physical Exam   Constitutional: She is oriented to person, place, and time. She appears well-developed and well-nourished. No distress.   HENT:   Head: Normocephalic.   Neck: Normal range of motion. Neck supple.   Cardiovascular: Normal rate and regular rhythm.   No murmur heard.  Pulmonary/Chest: Normal expansion, symmetric chest wall expansion, effort normal and breath sounds normal. No respiratory distress. She has no decreased breath sounds. She has no wheezes. She has no rhonchi. She has no rales.   Abdominal: Soft. She exhibits no distension. There is no hepatosplenomegaly. There is no tenderness.   Musculoskeletal: Normal " range of motion. She exhibits no edema.   Lymphadenopathy:     She has no cervical adenopathy.   Neurological: She is alert and oriented to person, place, and time. Gait normal.   Skin: Skin is warm and dry. No cyanosis. Nails show no clubbing.   Psychiatric: She has a normal mood and affect.   Vitals reviewed.    Personal Diagnostic Review          Assessment:     Problem List Items Addressed This Visit        Hematology    Pulmonary embolism with acute cor pulmonale    Overview     Noted on CTA in 3/2019.  Patient has been on Eliquis 5 mg BID since. Thought to have been provoked by OCP.         Current Assessment & Plan     D-dimer 0.51- still positive although it is greatly improved.  Recheck in 2 weeks.  Ok for her to return to work on October 26th with light duty.             Other Visit Diagnoses     Other pulmonary embolism without acute cor pulmonale, unspecified chronicity    -  Primary    Relevant Orders    D-DIMER, QUANTITATIVE

## 2019-10-15 NOTE — LETTER
October 15, 2019      [facility] - Pulmonology  [address], [facility] [address]  [facility] [address]  Phone: [phone]  Fax: [phone]       Patient: Jose Hi   YOB: 1980  Date of Visit: 10/15/2019    To Whom It May Concern:    Woody Hi  was at [facility] on 10/15/2019.  She may return to work restrictions. If you have any questions or concerns, or if I can be of further assistance, please do not hesitate to contact me.    Sincerely,    Yael Galan, DNP

## 2019-10-18 ENCOUNTER — TELEPHONE (OUTPATIENT)
Dept: PULMONOLOGY | Facility: CLINIC | Age: 39
End: 2019-10-18

## 2019-10-18 DIAGNOSIS — I26.01 ACUTE SEPTIC PULMONARY EMBOLISM WITH ACUTE COR PULMONALE: Primary | ICD-10-CM

## 2019-10-31 ENCOUNTER — OFFICE VISIT (OUTPATIENT)
Dept: SLEEP MEDICINE | Facility: CLINIC | Age: 39
End: 2019-10-31
Payer: COMMERCIAL

## 2019-10-31 ENCOUNTER — TELEPHONE (OUTPATIENT)
Dept: SLEEP MEDICINE | Facility: OTHER | Age: 39
End: 2019-10-31

## 2019-10-31 VITALS
DIASTOLIC BLOOD PRESSURE: 72 MMHG | SYSTOLIC BLOOD PRESSURE: 118 MMHG | HEIGHT: 68 IN | BODY MASS INDEX: 43.38 KG/M2 | WEIGHT: 286.25 LBS | HEART RATE: 82 BPM

## 2019-10-31 DIAGNOSIS — G47.30 SLEEP APNEA, UNSPECIFIED TYPE: Primary | ICD-10-CM

## 2019-10-31 PROCEDURE — 3008F PR BODY MASS INDEX (BMI) DOCUMENTED: ICD-10-PCS | Mod: CPTII,S$GLB,, | Performed by: NURSE PRACTITIONER

## 2019-10-31 PROCEDURE — 99999 PR PBB SHADOW E&M-EST. PATIENT-LVL IV: CPT | Mod: PBBFAC,,, | Performed by: NURSE PRACTITIONER

## 2019-10-31 PROCEDURE — 3008F BODY MASS INDEX DOCD: CPT | Mod: CPTII,S$GLB,, | Performed by: NURSE PRACTITIONER

## 2019-10-31 PROCEDURE — 99999 PR PBB SHADOW E&M-EST. PATIENT-LVL IV: ICD-10-PCS | Mod: PBBFAC,,, | Performed by: NURSE PRACTITIONER

## 2019-10-31 PROCEDURE — 99204 PR OFFICE/OUTPT VISIT, NEW, LEVL IV, 45-59 MIN: ICD-10-PCS | Mod: S$GLB,,, | Performed by: NURSE PRACTITIONER

## 2019-10-31 PROCEDURE — 99204 OFFICE O/P NEW MOD 45 MIN: CPT | Mod: S$GLB,,, | Performed by: NURSE PRACTITIONER

## 2019-10-31 NOTE — LETTER
October 31, 2019      Tobias Mclaughlin MD  [address]  [facility] [address]           [facility]  [address] [facility] [address]  Phone: [phone]  Fax: [phone]          Patient: Jose Hi   MR Number: [id number]   YOB: 1980   Date of Visit: 10/31/2019       Dear Dr. Tobias Mclaughlin:    Thank you for referring Jose Hi to me for evaluation. Attached you will find relevant portions of my assessment and plan of care.    If you have questions, please do not hesitate to call me. I look forward to following Jose Hi along with you.    Sincerely,    Clara Garland, NP    Enclosure  CC:  No Recipients    If you would like to receive this communication electronically, please contact [email or web address] or [phone] to request more information on [employer] access.    For providers and/or their staff who would like to refer a patient to [facility], please contact us through our one-stop-shop provider referral line, [facility], at [phone].    If you feel you have received this communication in error or would no longer like to receive these types of communications, please e-mail [email or web address]

## 2019-10-31 NOTE — PROGRESS NOTES
Jose Hi  was seen as a new patient, referred by Dr. Galan, for the evaluation of obstructive sleep apnea.     CHIEF COMPLAINT: Snoring, excessive daytime sleepiness    HISTORY OF PRESENT ILLNESS:Jose Hi a 39 y.o. male presents for the evaluation of obstructive sleep apnea. She has still not had her study ord'd from May at [facility]. +snores and has witnessed apneic pauses by her boyfirend. +disrupted sleep. She is always tired. New DM suboptimal control.+shortness of breath. ++oral drying in am. Side and back sleeper. Gets lingering sore throats.     Denies symptoms of restless legs or kicking during sleep.   PE 3/2019  EF 65%  3/2019 HgBA1c 13.4      EPWORTH SLEEPINESS SCALE 10/31/2019   Sitting and reading 1   Watching TV 2   Sitting, inactive in a public place (e.g. a theatre or a meeting) 1   As a passenger in a car for an hour without a break 2   Lying down to rest in the afternoon when circumstances permit 2   Sitting and talking to someone 0   Sitting quietly after a lunch without alcohol 0   In a car, while stopped for a few minutes in traffic 1   Total score 9     Sleep Clinic New Patient 10/31/2019   What time do you go to bed on a week day? (Give a range) 11 PM -6 AM   What time do you go to bed on a day off? (Give a range) take small naps   How long does it take you to fall asleep? (Give a range) 1 hr to 2   On average, how many times per night do you wake up? 3 times   How long does it take you to fall back into sleep? (Give a range) 1-3 minutes   What time do you wake up to start your day on a week day? (Give a range) 6 AM-6:15 AM   What time do you wake up to start your day on a day off? (Give a range) 8 AM   What time do you get out of bed? (Give a range) 6-8 AM   On average, how many hours do you sleep? 5-6 hrs   On average, how many naps do you take per day? 2   Rate your sleep quality from 0 to 5 (0-poor, 5-great). 0   Have you experienced:  Weight gain?   How much  "weight have you lost or gained (in lbs.) in the last year? 30 lbs   On average, how many times per night do you go to the bathroom?  3 times   Have you ever had a sleep study/CPAP machine/surgery for sleep apnea? No   Have you ever had a CPAP machine for sleep apnea? No   Have you ever had surgery for sleep apnea? No     FAMILY HISTORY: No known sleep disorders.     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  Sometimes   Sore throat or dry mouth in the morning? Yes   Irregular or very fast heart beat?  Yes   Shortness of breath?  Yes   Acid reflux? No   Body aches and pains?  Yes   Morning headaches? Sometimes   Dizziness? Sometimes   Mood changes?  Yes   Do you exercise?  Sometimes   Do you feel like moving your legs a lot?  No       PHYSICAL EXAM:   /72   Pulse 82   Ht 5' 8" (1.727 m)   Wt 129.9 kg (286 lb 4.3 oz)   BMI 43.53 kg/m²   GENERAL: morbid obese body habitus, well groomed   HEENT: Conjunctivae are non-erythematous; Pupils equal, round, and reactive to light; Nose is symmetrical; Nasal mucosa is normal; Septum is midline; Inferior turbinates are normal; Nasal airflow is normal; Posterior pharynx is pink; Modified Mallampati: I; Posterior palate is normal Tonsils 1+; Uvula is normal;Tongue is normal; Dentition is fair; No TMJ tenderness; Jaw opening and protrusion with click but without discomfort.   NECK: Supple. Neck circumference is 17inches. No thyromegaly. No palpable nodes.   SKIN: On face and neck: No abrasions, no rashes, no lesions. No subcutaneous nodules are palpable.   RESPIRATORY: Normal chest expansion and non-labored breathing at rest.   CARDIOVASCULAR: regular rate and rhythm  EXTREMITIES: No edema. No clubbing. No cyanosis. Station normal. Gait normal.   NEURO/PSYCH: Oriented to time, place and person. Normal attention " span and concentration. Affect is full. Mood is normal.       ASSESSMENT:     Unspecified Sleep Apnea, with symptoms of disruptive snoring, witnessed apneic pauses, un-refreshing disrupted sleep and excessive daytime sleepiness, with exam findings of a crowded oral airway, with medical comorbidities of morbid obesity, recent PE/resp distress and suboptimal controlled new DM2. Warrants further investigation for untreated sleep apnea.     PLAN:   1.Home Sleep Study, discussed plan of care  (call, plan PAP if +)   2. Discussed etiology of NABILA and potential ramifications of untreated NABILA, including stroke, heart disease, HTN.  We discussed potential treatment options, which could include weight loss (10-15%), body positioning, continuous positive airway pressure (CPAP-definitive), mandibular advancement splint by dentist, or referral for surgical consideration.   3. The patient was advised to abstain from driving should she feel sleepy or drowsy.

## 2019-11-18 ENCOUNTER — TELEPHONE (OUTPATIENT)
Dept: SLEEP MEDICINE | Facility: OTHER | Age: 39
End: 2019-11-18

## 2019-11-19 ENCOUNTER — HOSPITAL ENCOUNTER (OUTPATIENT)
Dept: SLEEP MEDICINE | Facility: OTHER | Age: 39
Discharge: HOME | End: 2019-11-19
Attending: NURSE PRACTITIONER
Payer: COMMERCIAL

## 2019-11-19 DIAGNOSIS — G47.30 SLEEP APNEA, UNSPECIFIED TYPE: ICD-10-CM

## 2019-11-19 DIAGNOSIS — G47.33 OSA (OBSTRUCTIVE SLEEP APNEA): ICD-10-CM

## 2019-11-19 PROCEDURE — 95800 SLP STDY UNATTENDED: CPT

## 2019-11-25 DIAGNOSIS — E11.9 NEW ONSET TYPE 2 DIABETES MELLITUS: Primary | ICD-10-CM

## 2019-11-25 PROCEDURE — 95800 PR SLEEP STUDY, UNATTENDED, RECORD HEART RATE/O2 SAT/RESP ANAL/SLEEP TIME: ICD-10-PCS | Mod: 26,,, | Performed by: PSYCHIATRY & NEUROLOGY

## 2019-11-25 PROCEDURE — 95800 SLP STDY UNATTENDED: CPT | Mod: 26,,, | Performed by: PSYCHIATRY & NEUROLOGY

## 2019-11-25 NOTE — TELEPHONE ENCOUNTER
----- Message from Mirlande Chinchilla sent at 11/25/2019  4:26 PM CST -----  Pt needs a refill for Levemir flex pen insulin. Cyn on [address] #[phone]

## 2019-11-26 DIAGNOSIS — G47.33 OBSTRUCTIVE SLEEP APNEA: Primary | ICD-10-CM

## 2019-11-26 RX ORDER — PEN NEEDLE, DIABETIC 30 GX3/16"
NEEDLE, DISPOSABLE MISCELLANEOUS
Qty: 200 EACH | Refills: 3 | Status: SHIPPED | OUTPATIENT
Start: 2019-11-26 | End: 2020-09-16

## 2019-11-26 NOTE — PROCEDURES
"Dear Referring Provider,    The sleep study that you ordered is complete. You have ordered sleep LAB services to perform the sleep study for Jose Hi.     Please find Sleep Study result in "Chart Review" under the "Media tab."     As the ordering provider, you are responsible for reviewing the results and implementing a treatment plan with your patient. If you need a Sleep Medicine provider to explain the sleep study findings and arrange treatment for the patient, please refer patient for consultation to our Sleep Clinic via [facility] with Ambulatory Consult Sleep.    To do that please place an order for an "Ambulatory Consult Sleep" - it will go to our clinic work queue for our Medical Assistant to contact the patient for an appointment.     For any questions, please contact our clinic staff at [phone] to talk to clinical staff.      "

## 2019-11-26 NOTE — TELEPHONE ENCOUNTER
prescription sent to   [id number] [place] - [address] AT [facility] [address]  [address]  [place] [address]  Phone: [phone] Fax: [phone]

## 2019-12-05 ENCOUNTER — TELEPHONE (OUTPATIENT)
Dept: FAMILY MEDICINE | Facility: CLINIC | Age: 39
End: 2019-12-05

## 2019-12-05 NOTE — TELEPHONE ENCOUNTER
----- Message from Sandra Rios sent at 12/5/2019  9:42 AM CST -----  Pt is needing a call back about her insulin   Pt [phone]

## 2019-12-09 ENCOUNTER — TELEPHONE (OUTPATIENT)
Dept: FAMILY MEDICINE | Facility: CLINIC | Age: 39
End: 2019-12-09

## 2019-12-09 DIAGNOSIS — E11.9 NEW ONSET TYPE 2 DIABETES MELLITUS: ICD-10-CM

## 2019-12-09 DIAGNOSIS — I26.01 ACUTE SEPTIC PULMONARY EMBOLISM WITH ACUTE COR PULMONALE: ICD-10-CM

## 2019-12-09 DIAGNOSIS — I82.411 DVT FEMORAL (DEEP VENOUS THROMBOSIS) WITH THROMBOPHLEBITIS, RIGHT: Primary | ICD-10-CM

## 2019-12-09 RX ORDER — APIXABAN 5 MG/1
5 TABLET, FILM COATED ORAL 2 TIMES DAILY
Qty: 60 TABLET | Refills: 5 | Status: SHIPPED | OUTPATIENT
Start: 2019-12-09 | End: 2020-03-12 | Stop reason: SDUPTHER

## 2019-12-09 NOTE — TELEPHONE ENCOUNTER
----- Message from Timothy Farr sent at 12/9/2019  2:14 PM CST -----  Contact: Jose Hi  Patient says that she is out of refills at the pharmacy on her Eliquis , so she needs more refills sent to the pharmacy . She also needs a PA done on her Novolog.   [address]   Pt# [phone]

## 2019-12-16 RX ORDER — INSULIN LISPRO 100 [IU]/ML
INJECTION, SOLUTION INTRAVENOUS; SUBCUTANEOUS
Qty: 15 ML | Refills: 3 | Status: SHIPPED | OUTPATIENT
Start: 2019-12-16 | End: 2020-09-04 | Stop reason: SDUPTHER

## 2019-12-20 ENCOUNTER — TELEPHONE (OUTPATIENT)
Dept: FAMILY MEDICINE | Facility: CLINIC | Age: 39
End: 2019-12-20

## 2019-12-20 NOTE — TELEPHONE ENCOUNTER
----- Message from Camila Bean MA sent at 12/20/2019 10:51 AM CST -----  Please review, patient changed to novolog due to noncoverage -DN    ----- Message -----  From: Sandra Rios  Sent: 12/20/2019  10:34 AM CST  To: Camila Bean MA    novolog was denied by pt insurance its not covered and excluded from coverage

## 2020-01-08 ENCOUNTER — LAB VISIT (OUTPATIENT)
Dept: LAB | Facility: HOSPITAL | Age: 40
End: 2020-01-08
Payer: COMMERCIAL

## 2020-01-08 DIAGNOSIS — I26.99 OTHER PULMONARY EMBOLISM WITHOUT ACUTE COR PULMONALE, UNSPECIFIED CHRONICITY: ICD-10-CM

## 2020-01-08 LAB — D DIMER PPP IA.FEU-MCNC: 0.38 MG/L FEU

## 2020-01-08 PROCEDURE — 36415 COLL VENOUS BLD VENIPUNCTURE: CPT

## 2020-01-08 PROCEDURE — 85379 FIBRIN DEGRADATION QUANT: CPT

## 2020-01-09 ENCOUNTER — TELEPHONE (OUTPATIENT)
Dept: PULMONOLOGY | Facility: CLINIC | Age: 40
End: 2020-01-09

## 2020-01-09 NOTE — PROGRESS NOTES
Hi there,    I need to run Ms. Hi's case past a MD but would you be able to call her and let her know that the D-dimer was negative and that is really good news?    I will be calling her later today as well.    Thanks!  karyna

## 2020-01-10 ENCOUNTER — TELEPHONE (OUTPATIENT)
Dept: PULMONOLOGY | Facility: CLINIC | Age: 40
End: 2020-01-10

## 2020-01-10 NOTE — TELEPHONE ENCOUNTER
----- Message from Yael Galan, KRISTEN sent at 1/9/2020 10:12 AM CST -----  Hi there,    I need to run Ms. Hi's case past a MD but would you be able to call her and let her know that the D-dimer was negative and that is really good news?    I will be calling her later today as well.    Thanks!  yael

## 2020-01-15 ENCOUNTER — TELEPHONE (OUTPATIENT)
Dept: PULMONOLOGY | Facility: CLINIC | Age: 40
End: 2020-01-15

## 2020-01-15 NOTE — TELEPHONE ENCOUNTER
Spoke to the pt giving her the results to her lab.D-dimer was negative but MEERA Conley will run her case by an MD. Pt stated her understanding.

## 2020-02-03 ENCOUNTER — TELEPHONE (OUTPATIENT)
Dept: PULMONOLOGY | Facility: CLINIC | Age: 40
End: 2020-02-03

## 2020-02-03 NOTE — TELEPHONE ENCOUNTER
Spoke with patient- ok to stop anticoagulation as D-dimer has dropped and patient has had no further issues with dyspnea or PE.  She can reach out to us for any further pulmonary  needs she may have.  patient verbalizes understanding

## 2020-03-04 ENCOUNTER — TELEPHONE (OUTPATIENT)
Dept: FAMILY MEDICINE | Facility: CLINIC | Age: 40
End: 2020-03-04

## 2020-03-04 DIAGNOSIS — Z79.01 ENCOUNTER FOR CURRENT LONG-TERM USE OF ANTICOAGULANTS: ICD-10-CM

## 2020-03-04 DIAGNOSIS — Z79.4 TYPE 2 DIABETES MELLITUS WITH OTHER SPECIFIED COMPLICATION, WITH LONG-TERM CURRENT USE OF INSULIN: Primary | ICD-10-CM

## 2020-03-04 DIAGNOSIS — E66.01 MORBIDLY OBESE: ICD-10-CM

## 2020-03-04 DIAGNOSIS — E11.69 TYPE 2 DIABETES MELLITUS WITH OTHER SPECIFIED COMPLICATION, WITH LONG-TERM CURRENT USE OF INSULIN: Primary | ICD-10-CM

## 2020-03-04 DIAGNOSIS — Z20.2 POSSIBLE EXPOSURE TO STD: ICD-10-CM

## 2020-03-04 DIAGNOSIS — G47.33 OSA (OBSTRUCTIVE SLEEP APNEA): ICD-10-CM

## 2020-03-04 NOTE — TELEPHONE ENCOUNTER
lmor to notify lab orders available, to complete fasting.  Informed pt to return call with questions/concerns -DN

## 2020-03-04 NOTE — TELEPHONE ENCOUNTER
Pt returned call, visit scheduled for 3.12.20.  Requests blood work for sugar, STDs and HIV, as well as routine yearly labs -DMN

## 2020-03-04 NOTE — TELEPHONE ENCOUNTER
----- Message from Timothy Farr sent at 3/2/2020  8:45 AM CST -----  Contact: Jose Hi  Pt would like to know can she see Dr. Hi this Wednesday anytime after or 12:30, 1:30-2/:230 ?? She says it would be to check on her diabetes and for blood work as well. Please give her a call back   #[phone]

## 2020-03-06 ENCOUNTER — TELEPHONE (OUTPATIENT)
Dept: SURGERY | Facility: CLINIC | Age: 40
End: 2020-03-06

## 2020-03-06 ENCOUNTER — TELEPHONE (OUTPATIENT)
Dept: PULMONOLOGY | Facility: CLINIC | Age: 40
End: 2020-03-06

## 2020-03-06 ENCOUNTER — DOCUMENTATION ONLY (OUTPATIENT)
Dept: PULMONOLOGY | Facility: CLINIC | Age: 40
End: 2020-03-06

## 2020-03-06 DIAGNOSIS — R79.89 ELEVATED D-DIMER: Primary | ICD-10-CM

## 2020-03-06 DIAGNOSIS — I82.411 DVT FEMORAL (DEEP VENOUS THROMBOSIS) WITH THROMBOPHLEBITIS, RIGHT: Primary | ICD-10-CM

## 2020-03-06 NOTE — TELEPHONE ENCOUNTER
Case discussed with Dr. Guillen- in light of +D-dimer and +DVT      Pt was having SOB and leg pain that began gradually over the last two weeks    Would like to restart Eliquis 5 mg BID    Spoke with pt- she is waiting to be seen in the ED.  We will see her in clinic on Tuesday at [facility]

## 2020-03-06 NOTE — TELEPHONE ENCOUNTER
----- Message from Yael Galan DNP sent at 3/6/2020  2:59 PM CST -----  Ms. Hi needs a CT with contrast of her chest to rule out PE.  This is ordered STAT.  Thanks

## 2020-03-06 NOTE — TELEPHONE ENCOUNTER
Called the patient to advise a Venous Ultrasound of the bilateral lower extremities has been ordered and scheduled at [facility] today. Patient advised to sign in at patient registration for the Ultrasound at the same time signing in for lab work today.. Patient will report to patient registration as instruction.

## 2020-03-06 NOTE — PROGRESS NOTES
Patient D-Dimer elevated- her US is not scheduled until 4pm today.  Attempted to contact patient to discuss lab results and likely need for CT chest (which was ordered).  Contacted [facility] staff, who called patient and left VM.  Personally contacted patient, no answer.  Case discussed with Dr. Martinez. Await results of US, may need to restart eliquis and follow with Heme/Onc for work up.

## 2020-03-10 ENCOUNTER — TELEPHONE (OUTPATIENT)
Dept: HEMATOLOGY/ONCOLOGY | Facility: CLINIC | Age: 40
End: 2020-03-10

## 2020-03-10 ENCOUNTER — OFFICE VISIT (OUTPATIENT)
Dept: PULMONOLOGY | Facility: CLINIC | Age: 40
End: 2020-03-10
Payer: COMMERCIAL

## 2020-03-10 VITALS
BODY MASS INDEX: 41.95 KG/M2 | HEART RATE: 74 BPM | WEIGHT: 276.81 LBS | HEIGHT: 68 IN | OXYGEN SATURATION: 98 % | SYSTOLIC BLOOD PRESSURE: 122 MMHG | DIASTOLIC BLOOD PRESSURE: 74 MMHG

## 2020-03-10 DIAGNOSIS — Z86.718 HISTORY OF BLOOD CLOTS: Primary | ICD-10-CM

## 2020-03-10 DIAGNOSIS — I82.411 DVT FEMORAL (DEEP VENOUS THROMBOSIS) WITH THROMBOPHLEBITIS, RIGHT: ICD-10-CM

## 2020-03-10 DIAGNOSIS — I26.99 ACUTE PULMONARY EMBOLISM WITHOUT ACUTE COR PULMONALE, UNSPECIFIED PULMONARY EMBOLISM TYPE: ICD-10-CM

## 2020-03-10 PROCEDURE — 99213 OFFICE O/P EST LOW 20 MIN: CPT | Mod: S$GLB,,, | Performed by: NURSE PRACTITIONER

## 2020-03-10 PROCEDURE — 99999 PR PBB SHADOW E&M-EST. PATIENT-LVL IV: CPT | Mod: PBBFAC,,, | Performed by: NURSE PRACTITIONER

## 2020-03-10 PROCEDURE — 3008F BODY MASS INDEX DOCD: CPT | Mod: CPTII,S$GLB,, | Performed by: NURSE PRACTITIONER

## 2020-03-10 PROCEDURE — 3008F PR BODY MASS INDEX (BMI) DOCUMENTED: ICD-10-PCS | Mod: CPTII,S$GLB,, | Performed by: NURSE PRACTITIONER

## 2020-03-10 PROCEDURE — 99999 PR PBB SHADOW E&M-EST. PATIENT-LVL IV: ICD-10-PCS | Mod: PBBFAC,,, | Performed by: NURSE PRACTITIONER

## 2020-03-10 PROCEDURE — 99213 PR OFFICE/OUTPT VISIT, EST, LEVL III, 20-29 MIN: ICD-10-PCS | Mod: S$GLB,,, | Performed by: NURSE PRACTITIONER

## 2020-03-10 RX ORDER — PEN NEEDLE, DIABETIC 30 GX3/16"
NEEDLE, DISPOSABLE MISCELLANEOUS
COMMUNITY
Start: 2019-09-04 | End: 2020-09-16

## 2020-03-10 RX ORDER — INSULIN ASPART 100 [IU]/ML
19 INJECTION, SOLUTION INTRAVENOUS; SUBCUTANEOUS
COMMUNITY
Start: 2019-05-13 | End: 2020-08-12 | Stop reason: SDUPTHER

## 2020-03-10 NOTE — LETTER
March 11,2020      [facility]  [address], [facility] [address]  [facility] [address]  Phone: [phone]  Fax: [phone]       Date of Visit: 03/11/2020    To Whom It May Concern:    Please be advised that under state and federal laws as it relates to patient privacy and Health Insurance Accountability Act (HIPAA), we can not release our patient(s) name without authorization. Although, we can confirm that the individual listed below did accompany a person to our facility for healthcare services to be provided.    This document confirms that Jonahmarga Stubbs accompanied a patient to our facility on 03/11/2020.    Sincerely,     Serena Pierre MA

## 2020-03-10 NOTE — PATIENT INSTRUCTIONS
Keep using your Eliquis as prescribed    See Hematology     Keep us posted on how you are doing!

## 2020-03-11 ENCOUNTER — TELEPHONE (OUTPATIENT)
Dept: HEMATOLOGY/ONCOLOGY | Facility: CLINIC | Age: 40
End: 2020-03-11

## 2020-03-12 ENCOUNTER — OFFICE VISIT (OUTPATIENT)
Dept: FAMILY MEDICINE | Facility: CLINIC | Age: 40
End: 2020-03-12
Payer: COMMERCIAL

## 2020-03-12 VITALS
WEIGHT: 277 LBS | HEART RATE: 76 BPM | HEIGHT: 68 IN | BODY MASS INDEX: 41.98 KG/M2 | SYSTOLIC BLOOD PRESSURE: 102 MMHG | DIASTOLIC BLOOD PRESSURE: 54 MMHG

## 2020-03-12 DIAGNOSIS — E11.9 TYPE 2 DIABETES MELLITUS WITHOUT COMPLICATION, WITH LONG-TERM CURRENT USE OF INSULIN: ICD-10-CM

## 2020-03-12 DIAGNOSIS — I26.01 ACUTE SEPTIC PULMONARY EMBOLISM WITH ACUTE COR PULMONALE: ICD-10-CM

## 2020-03-12 DIAGNOSIS — I26.09 OTHER CHRONIC PULMONARY EMBOLISM WITH ACUTE COR PULMONALE: ICD-10-CM

## 2020-03-12 DIAGNOSIS — Z79.4 TYPE 2 DIABETES MELLITUS WITHOUT COMPLICATION, WITH LONG-TERM CURRENT USE OF INSULIN: ICD-10-CM

## 2020-03-12 DIAGNOSIS — I82.411 DVT FEMORAL (DEEP VENOUS THROMBOSIS) WITH THROMBOPHLEBITIS, RIGHT: Primary | ICD-10-CM

## 2020-03-12 DIAGNOSIS — I27.82 OTHER CHRONIC PULMONARY EMBOLISM WITH ACUTE COR PULMONALE: ICD-10-CM

## 2020-03-12 LAB — HBA1C MFR BLD: 11.4 %

## 2020-03-12 PROCEDURE — 99214 OFFICE O/P EST MOD 30 MIN: CPT | Mod: S$GLB,,, | Performed by: FAMILY MEDICINE

## 2020-03-12 PROCEDURE — 3008F PR BODY MASS INDEX (BMI) DOCUMENTED: ICD-10-PCS | Mod: S$GLB,,, | Performed by: FAMILY MEDICINE

## 2020-03-12 PROCEDURE — 3008F BODY MASS INDEX DOCD: CPT | Mod: S$GLB,,, | Performed by: FAMILY MEDICINE

## 2020-03-12 PROCEDURE — 83036 HEMOGLOBIN GLYCOSYLATED A1C: CPT | Mod: QW,,, | Performed by: FAMILY MEDICINE

## 2020-03-12 PROCEDURE — 3046F HEMOGLOBIN A1C LEVEL >9.0%: CPT | Mod: S$GLB,,, | Performed by: FAMILY MEDICINE

## 2020-03-12 PROCEDURE — 99214 PR OFFICE/OUTPT VISIT, EST, LEVL IV, 30-39 MIN: ICD-10-PCS | Mod: S$GLB,,, | Performed by: FAMILY MEDICINE

## 2020-03-12 PROCEDURE — 83036 POCT HEMOGLOBIN A1C: ICD-10-PCS | Mod: QW,,, | Performed by: FAMILY MEDICINE

## 2020-03-12 PROCEDURE — 3046F PR MOST RECENT HEMOGLOBIN A1C LEVEL > 9.0%: ICD-10-PCS | Mod: S$GLB,,, | Performed by: FAMILY MEDICINE

## 2020-03-12 RX ORDER — LISINOPRIL 2.5 MG/1
2.5 TABLET ORAL DAILY
Qty: 30 TABLET | Refills: 12 | Status: SHIPPED | OUTPATIENT
Start: 2020-03-12 | End: 2021-03-23 | Stop reason: SDUPTHER

## 2020-03-12 RX ORDER — APIXABAN 5 MG/1
5 TABLET, FILM COATED ORAL 2 TIMES DAILY
Qty: 60 TABLET | Refills: 12 | Status: SHIPPED | OUTPATIENT
Start: 2020-03-12 | End: 2021-03-23 | Stop reason: SDUPTHER

## 2020-03-12 RX ORDER — SIMVASTATIN 5 MG/1
10 TABLET, FILM COATED ORAL NIGHTLY
Qty: 30 TABLET | Refills: 12 | Status: SHIPPED | OUTPATIENT
Start: 2020-03-12 | End: 2020-08-13

## 2020-03-12 NOTE — PROGRESS NOTES
SUBJECTIVE:    Patient ID: Jose Hi is a 39 y.o. female.    Chief Complaint: DM Check Up / A1C / Foot Check / Lab Review; recent admission: PE; med increased: eliquis; and visit with heme on weds with bloodwork    Patient with history DVT and PE approximately 1 year ago presents for visit.  She also has type 2 diabetes currently on insulin number also diagnosed 1 year ago.  She presents today status post hospital follow-up.  Patient had become with leg pain and swelling and was referred to the emergency room.  Venous ultrasound was positive for lower extremity DVTs.  She had a positive D-dimer.  She also had additional testing that showed pulmonary emboli.  The patient had no previous abnormal blood work that demonstrated coagulopathy. It was believed that her clots were due to use of OCPs.  She had been on Eliquis for 9 months following her 1st episode of DVT and PE.  Now she has been resumed on anticoagulant therapy.  She has upcoming visits pending her hematologist.  She continues to follow with diabetic management for her blood sugars. Pre dayan BS around 200         Office Visit on 03/12/2020   Component Date Value Ref Range Status    Hemoglobin A1C 03/12/2020 11.4  % Final   Admission on 03/06/2020, Discharged on 03/06/2020   Component Date Value Ref Range Status    WBC 03/06/2020 6.10  3.90 - 12.70 K/uL Final    RBC 03/06/2020 4.80  4.00 - 5.40 M/uL Final    Hemoglobin 03/06/2020 12.8  12.0 - 16.0 g/dL Final    Hematocrit 03/06/2020 39.8  37.0 - 48.5 % Final    Mean Corpuscular Volume 03/06/2020 83  82 - 98 fL Final    Mean Corpuscular Hemoglobin 03/06/2020 26.7* 27.0 - 31.0 pg Final    Mean Corpuscular Hemoglobin Conc 03/06/2020 32.3  32.0 - 36.0 g/dL Final    RDW 03/06/2020 14.3  11.5 - 14.5 % Final    Platelets 03/06/2020 225  150 - 350 K/uL Final    MPV 03/06/2020 8.5* 9.2 - 12.9 fL Final    Gran # (ANC) 03/06/2020 3.1  1.8 - 7.7 K/uL Final    Lymph # 03/06/2020 2.2  1.0 - 4.8 K/uL  Final    Mono # 03/06/2020 0.5  0.3 - 1.0 K/uL Final    Eos # 03/06/2020 0.3  0.0 - 0.5 K/uL Final    Baso # 03/06/2020 0.10  0.00 - 0.20 K/uL Final    Gran% 03/06/2020 50.4  38.0 - 73.0 % Final    Lymph% 03/06/2020 36.2  18.0 - 48.0 % Final    Mono% 03/06/2020 8.0  4.0 - 15.0 % Final    Eosinophil% 03/06/2020 4.4  0.0 - 8.0 % Final    Basophil% 03/06/2020 1.0  0.0 - 1.9 % Final    Differential Method 03/06/2020 Automated   Final    Sodium 03/06/2020 136  136 - 145 mmol/L Final    Potassium 03/06/2020 4.1  3.5 - 5.1 mmol/L Final    Chloride 03/06/2020 100* 101 - 111 mmol/L Final    CO2 03/06/2020 27  23 - 29 mmol/L Final    Glucose 03/06/2020 193* 74 - 118 mg/dL Final    BUN, Bld 03/06/2020 7  6 - 20 mg/dL Final    Creatinine 03/06/2020 0.8  0.5 - 1.4 mg/dL Final    Calcium 03/06/2020 9.4  8.6 - 10.0 mg/dL Final    Total Protein 03/06/2020 7.3  6.0 - 8.4 g/dL Final    Albumin 03/06/2020 3.6  3.5 - 5.2 g/dL Final    Total Bilirubin 03/06/2020 0.5  0.3 - 1.2 mg/dL Final    Alkaline Phosphatase 03/06/2020 49  38 - 126 U/L Final    AST 03/06/2020 15  15 - 41 U/L Final    ALT 03/06/2020 19  14 - 54 U/L Final    Anion Gap 03/06/2020 9  8 - 16 mmol/L Final    eGFR if African American 03/06/2020 >60.0  >60 mL/min/1.73 m^2 Final    eGFR if non African American 03/06/2020 >60.0  >60 mL/min/1.73 m^2 Final    Troponin I 03/06/2020 <0.01  0.01 - 0.05 ng/mL Final    Prothrombin Time 03/06/2020 10.3  9.0 - 12.5 sec Final    INR 03/06/2020 1.0  0.8 - 1.2 Final    BNP 03/06/2020 53  0 - 99 pg/mL Final    D-Dimer 03/06/2020 1.99* <0.50 mg/L FEU Final    POC Preg Test, Ur 03/06/2020 Negative  Negative Final     Acceptable 03/06/2020 Yes   Final   Lab Visit on 03/06/2020   Component Date Value Ref Range Status    D-Dimer 03/06/2020 1.94* <0.50 mg/L FEU Final   Lab Visit on 01/08/2020   Component Date Value Ref Range Status    D-Dimer 01/08/2020 0.38  <0.50 mg/L FEU Final   Lab Visit on  10/07/2019   Component Date Value Ref Range Status    D-Dimer 10/07/2019 0.51* <0.50 mg/L FEU Final     Admit Date: 3/6/20   Discharge Date: 3/6/20  Discharge Facility: Hospital    Medication Reconciliation:  Medications changed/added/deleted. eliquis resumed  New Prescriptions filled after discharge: yes  Discharge summary reviewed:  yes  Pending test results at discharge reviewed:   not applicable  Follow up appointments scheduled:  yes   with Pulmonology and heme onc  Follow up labs/tests ordered:   no  Home Health ordered on discharge:   not applicable  Home Health company name: N/A  DME ordered at discharge:   not applicable  How patient is feeling since discharge from the hospital?  fine     Some leg pain and MONZON    Office Visit on 03/12/2020   Component Date Value Ref Range Status    Hemoglobin A1C 03/12/2020 11.4  % Final   Admission on 03/06/2020, Discharged on 03/06/2020   Component Date Value Ref Range Status    WBC 03/06/2020 6.10  3.90 - 12.70 K/uL Final    RBC 03/06/2020 4.80  4.00 - 5.40 M/uL Final    Hemoglobin 03/06/2020 12.8  12.0 - 16.0 g/dL Final    Hematocrit 03/06/2020 39.8  37.0 - 48.5 % Final    Mean Corpuscular Volume 03/06/2020 83  82 - 98 fL Final    Mean Corpuscular Hemoglobin 03/06/2020 26.7* 27.0 - 31.0 pg Final    Mean Corpuscular Hemoglobin Conc 03/06/2020 32.3  32.0 - 36.0 g/dL Final    RDW 03/06/2020 14.3  11.5 - 14.5 % Final    Platelets 03/06/2020 225  150 - 350 K/uL Final    MPV 03/06/2020 8.5* 9.2 - 12.9 fL Final    Gran # (ANC) 03/06/2020 3.1  1.8 - 7.7 K/uL Final    Lymph # 03/06/2020 2.2  1.0 - 4.8 K/uL Final    Mono # 03/06/2020 0.5  0.3 - 1.0 K/uL Final    Eos # 03/06/2020 0.3  0.0 - 0.5 K/uL Final    Baso # 03/06/2020 0.10  0.00 - 0.20 K/uL Final    Gran% 03/06/2020 50.4  38.0 - 73.0 % Final    Lymph% 03/06/2020 36.2  18.0 - 48.0 % Final    Mono% 03/06/2020 8.0  4.0 - 15.0 % Final    Eosinophil% 03/06/2020 4.4  0.0 - 8.0 % Final    Basophil%  03/06/2020 1.0  0.0 - 1.9 % Final    Differential Method 03/06/2020 Automated   Final    Sodium 03/06/2020 136  136 - 145 mmol/L Final    Potassium 03/06/2020 4.1  3.5 - 5.1 mmol/L Final    Chloride 03/06/2020 100* 101 - 111 mmol/L Final    CO2 03/06/2020 27  23 - 29 mmol/L Final    Glucose 03/06/2020 193* 74 - 118 mg/dL Final    BUN, Bld 03/06/2020 7  6 - 20 mg/dL Final    Creatinine 03/06/2020 0.8  0.5 - 1.4 mg/dL Final    Calcium 03/06/2020 9.4  8.6 - 10.0 mg/dL Final    Total Protein 03/06/2020 7.3  6.0 - 8.4 g/dL Final    Albumin 03/06/2020 3.6  3.5 - 5.2 g/dL Final    Total Bilirubin 03/06/2020 0.5  0.3 - 1.2 mg/dL Final    Alkaline Phosphatase 03/06/2020 49  38 - 126 U/L Final    AST 03/06/2020 15  15 - 41 U/L Final    ALT 03/06/2020 19  14 - 54 U/L Final    Anion Gap 03/06/2020 9  8 - 16 mmol/L Final    eGFR if African American 03/06/2020 >60.0  >60 mL/min/1.73 m^2 Final    eGFR if non African American 03/06/2020 >60.0  >60 mL/min/1.73 m^2 Final    Troponin I 03/06/2020 <0.01  0.01 - 0.05 ng/mL Final    Prothrombin Time 03/06/2020 10.3  9.0 - 12.5 sec Final    INR 03/06/2020 1.0  0.8 - 1.2 Final    BNP 03/06/2020 53  0 - 99 pg/mL Final    D-Dimer 03/06/2020 1.99* <0.50 mg/L FEU Final    POC Preg Test, Ur 03/06/2020 Negative  Negative Final     Acceptable 03/06/2020 Yes   Final   Lab Visit on 03/06/2020   Component Date Value Ref Range Status    D-Dimer 03/06/2020 1.94* <0.50 mg/L FEU Final   Lab Visit on 01/08/2020   Component Date Value Ref Range Status    D-Dimer 01/08/2020 0.38  <0.50 mg/L FEU Final   Lab Visit on 10/07/2019   Component Date Value Ref Range Status    D-Dimer 10/07/2019 0.51* <0.50 mg/L FEU Final       Past Medical History:   Diagnosis Date    Diabetes     History of pulmonary embolus (PE)     March 2019     Past Surgical History:   Procedure Laterality Date    FRACTURE SURGERY      RIGHT HEART CATHETERIZATION N/A 3/22/2019    Procedure:  INSERTION, CATHETER, RIGHT HEART;  Surgeon: Andres Cates MD;  Location: [facility] CATH LAB;  Service: Cardiology;  Laterality: N/A;    TUBAL LIGATION       History reviewed. No pertinent family history.    Marital Status:   Alcohol History:  reports that she drinks alcohol.  Tobacco History:  reports that she has never smoked. She has never used smokeless tobacco.  Drug History:  reports that she does not use drugs.    Review of patient's allergies indicates:   Allergen Reactions    Contrast media        Current Outpatient Medications:     apixaban (ELIQUIS) 5 mg Tab, Take 2 tablets (10 mg total) by mouth 2 (two) times daily. for 7 days, Disp: 28 tablet, Rfl: 0    blood sugar diagnostic Strp, Test 4 (four) times daily., Disp: 150 strip, Rfl: 0    blood-glucose meter kit, Use as instructed, Disp: 1 each, Rfl: 0    DEXCOM G6  Misc, USE  D UTD, Disp: , Rfl: 0    DEXCOM G6 SENSOR Dory, USE 1 SENSOR EVERY 10 DAYS, Disp: , Rfl: 0    DEXCOM G6 TRANSMITTER Dory, USE 1 EVERY 3 MONTHS AS DIRECTED, Disp: , Rfl: 3    ELIQUIS 5 mg Tab, Take 1 tablet (5 mg total) by mouth 2 (two) times daily., Disp: 60 tablet, Rfl: 12    insulin aspart U-100 (NOVOLOG FLEXPEN U-100 INSULIN) 100 unit/mL (3 mL) InPn pen, 19 Units 3 (three) times daily with meals. , Disp: , Rfl:     insulin detemir U-100 (LEVEMIR FLEXTOUCH U-100 INSULN) 100 unit/mL (3 mL) SubQ InPn pen, Inject 40 Units into the skin once daily. (Patient taking differently: Inject 20 Units into the skin 2 (two) times daily. ), Disp: 15 mL, Rfl: 3    insulin lispro (HUMALOG KWIKPEN INSULIN) 100 unit/mL pen, Inject 10 units subq with biggest meal of the day, Disp: 15 mL, Rfl: 3    lancets (ACCU-CHEK MULTICLIX LANCET) Misc, 1 each by Misc.(Non-Drug; Combo Route) route 3 (three) times daily., Disp: 200 each, Rfl: 6    lisinopriL (PRINIVIL,ZESTRIL) 2.5 MG tablet, Take 1 tablet (2.5 mg total) by mouth once daily., Disp: 30 tablet, Rfl: 12    pen needle,  "diabetic (BD ULTRA-FINE LEANA PEN NEEDLE) 32 gauge x 5/32" Ndle, Use with insulin QID, Disp: 200 each, Rfl: 3    pen needle, diabetic (BD ULTRA-FINE LEANA PEN NEEDLE) 32 gauge x 5/32" Ndle, use with pens, Disp: , Rfl:     simvastatin (ZOCOR) 5 MG tablet, Take 2 tablets (10 mg total) by mouth every evening., Disp: 30 tablet, Rfl: 12    Review of Systems   Constitutional: Negative for activity change, fatigue and unexpected weight change.   HENT: Negative for hearing loss, postnasal drip, sinus pressure, sore throat and voice change.    Eyes: Negative for photophobia and visual disturbance.   Respiratory: Negative for cough, shortness of breath and wheezing.    Cardiovascular: Negative for chest pain and palpitations.   Gastrointestinal: Negative for constipation, diarrhea and nausea.   Genitourinary: Negative for difficulty urinating, frequency, hematuria and urgency.   Musculoskeletal: Negative for arthralgias and back pain.   Skin: Negative for rash.   Neurological: Negative for weakness, light-headedness and headaches.   Hematological: Negative for adenopathy. Does not bruise/bleed easily.   Psychiatric/Behavioral: The patient is not nervous/anxious.         Objective:      Vitals:    03/12/20 0820   BP: (!) 102/54   Pulse: 76   Weight: 125.6 kg (277 lb)   Height: 5' 8" (1.727 m)     Physical Exam   Constitutional: She is oriented to person, place, and time. Vital signs are normal. She appears well-developed and well-nourished. No distress.   HENT:   Head: Normocephalic and atraumatic.   Right Ear: Tympanic membrane and external ear normal.   Left Ear: Tympanic membrane and external ear normal.   Eyes: Pupils are equal, round, and reactive to light. Conjunctivae, EOM and lids are normal.   Neck: Full passive range of motion without pain. Neck supple. No JVD present. No tracheal deviation present. No thyromegaly present.   Cardiovascular: Normal rate and regular rhythm. PMI is not displaced.   Pulmonary/Chest: " Effort normal and breath sounds normal.   Abdominal: Soft. Bowel sounds are normal. There is no hepatosplenomegaly. There is no tenderness. There is no rebound and no guarding.   Musculoskeletal: Normal range of motion. She exhibits tenderness. She exhibits no edema.   Over right calf   Neurological: She is alert and oriented to person, place, and time.   Skin: Skin is warm and dry. No rash noted.   Psychiatric: She has a normal mood and affect.   Vitals reviewed.      Diabetic foot exam performed was normal     Assessment:       1. DVT femoral (deep venous thrombosis) with thrombophlebitis, right    2. Type 2 diabetes mellitus without complication, with long-term current use of insulin    3. Body mass index (BMI) 40.0-44.9, adult    4. Other chronic pulmonary embolism with acute cor pulmonale    5. Acute septic pulmonary embolism with acute cor pulmonale         Plan:       DVT femoral (deep venous thrombosis) with thrombophlebitis, right  Continue Eliquis    Type 2 diabetes mellitus without complication, with long-term current use of insulin  -     Hemoglobin A1C, POCT  Increase levemir better control is needed  Body mass index (BMI) 40.0-44.9, adult    Other chronic pulmonary embolism with acute cor pulmonale      Follow up in about 3 months (around 6/12/2020) for Diabetic Check-Up.

## 2020-03-13 LAB
ALBUMIN/CREAT UR: 3 MCG/MG CREAT
CHOLEST SERPL-MCNC: 180 MG/DL
CHOLEST/HDLC SERPL: 4.1 (CALC)
CREAT UR-MCNC: 121 MG/DL (ref 20–275)
HDLC SERPL-MCNC: 44 MG/DL
HIV 1+2 AB+HIV1 P24 AG SERPL QL IA: NORMAL
LDLC SERPL CALC-MCNC: 105 MG/DL (CALC)
MICROALBUMIN UR-MCNC: 0.4 MG/DL
NONHDLC SERPL-MCNC: 136 MG/DL (CALC)
RPR SER QL: NORMAL
TRIGL SERPL-MCNC: 193 MG/DL
TSH SERPL-ACNC: 0.97 MIU/L

## 2020-03-16 NOTE — PROGRESS NOTES
"Subjective:       Patient ID: Jose Hi is a 39 y.o. female.    Chief Complaint: Follow-up    HPI:   Jose Hi is a 39 y.o. female who presents with follow after an ED evaluation for PE.  She had a positive d-dimer, and clot found in her legs.  She was dyspneic and after work- up in the ED was found to have a PE. She was restarted on her Eliquis and has been taking faithfully.    Review of Systems   Constitutional: Negative for fever and malaise/fatigue.   Respiratory: Positive for shortness of breath.    Cardiovascular: Positive for leg swelling.          Social History     Tobacco Use    Smoking status: Never Smoker    Smokeless tobacco: Never Used   Substance Use Topics    Alcohol use: Yes     Comment: socially       Review of patient's allergies indicates:   Allergen Reactions    Contrast media      Past Medical History:   Diagnosis Date    Diabetes     History of pulmonary embolus (PE)     March 2019     Past Surgical History:   Procedure Laterality Date    FRACTURE SURGERY      RIGHT HEART CATHETERIZATION N/A 3/22/2019    Procedure: INSERTION, CATHETER, RIGHT HEART;  Surgeon: Andres Cates MD;  Location: [facility] CATH LAB;  Service: Cardiology;  Laterality: N/A;    TUBAL LIGATION       Current Outpatient Medications on File Prior to Visit   Medication Sig    insulin aspart U-100 (NOVOLOG FLEXPEN U-100 INSULIN) 100 unit/mL (3 mL) InPn pen 19 Units 3 (three) times daily with meals.     insulin detemir U-100 (LEVEMIR FLEXTOUCH U-100 INSULN) 100 unit/mL (3 mL) SubQ InPn pen Inject 40 Units into the skin once daily. (Patient taking differently: Inject 20 Units into the skin 2 (two) times daily. )    pen needle, diabetic (BD ULTRA-FINE LEANA PEN NEEDLE) 32 gauge x 5/32" Ndle use with pens    blood sugar diagnostic Strp Test 4 (four) times daily.    blood-glucose meter kit Use as instructed    DEXCOM G6  Misc USE  D UTD    DEXCOM G6 SENSOR Dory USE 1 SENSOR EVERY 10 DAYS    " "DEXCOM G6 TRANSMITTER Dory USE 1 EVERY 3 MONTHS AS DIRECTED    insulin lispro (HUMALOG KWIKPEN INSULIN) 100 unit/mL pen Inject 10 units subq with biggest meal of the day    lancets (ACCU-CHEK MULTICLIX LANCET) Misc 1 each by Misc.(Non-Drug; Combo Route) route 3 (three) times daily.    pen needle, diabetic (BD ULTRA-FINE LEANA PEN NEEDLE) 32 gauge x 5/32" Ndle Use with insulin QID     No current facility-administered medications on file prior to visit.        Objective:      Vitals:    03/10/20 1419   BP: 122/74   Pulse: 74     Physical Exam   Constitutional: She is oriented to person, place, and time. She appears well-developed and well-nourished. No distress.   HENT:   Head: Normocephalic.   Neck: Normal range of motion. Neck supple.   Cardiovascular: Normal rate.   Pulmonary/Chest: Normal expansion. No respiratory distress. She has no decreased breath sounds. She has no wheezes. She has no rales.   Abdominal: Soft.   Musculoskeletal: Normal range of motion. She exhibits no edema.   Lymphadenopathy:     She has no axillary adenopathy.   Neurological: She is alert and oriented to person, place, and time. Gait normal.   Skin: Skin is warm and dry. She is not diaphoretic. No erythema. Nails show no clubbing.   Palpable, firm, tender, linear structure right posterior leg   Psychiatric: She has a normal mood and affect.   Nursing note and vitals reviewed.    Personal Diagnostic Review    Imaging personally reviewed with patient CTA 3/6/2020          Assessment:     Problem List Items Addressed This Visit        Hematology    Pulmonary embolism without acute cor pulmonale    Overview     Noted on CTA 3/6/2020.  Had been off of Eliquis for several months and began having calf pain.    Restart Eliquis  Heme/Onc eval when time allows               Current Assessment & Plan     Patient's original PE was thought to be provoked by OCP.  For this to recur in the absence of provocation by OCP is of concern- although a referral " to Heme/Onc will be helpful (especially for her children) I have discussed with her that anticoagulation will be lifelong.         DVT femoral (deep venous thrombosis) with thrombophlebitis, right    Overview     Noted in March 2019, again noted March 2020         Current Assessment & Plan     As now.  On Eliquis.  OK for patient to keep working and walking as tolerated           Other Visit Diagnoses     History of blood clots    -  Primary    Relevant Orders    Ambulatory referral/consult to Hematology / Oncology

## 2020-03-16 NOTE — ASSESSMENT & PLAN NOTE
Patient's original PE was thought to be provoked by OCP.  For this to recur in the absence of provocation by OCP is of concern- although a referral to Heme/Onc will be helpful (especially for her children) I have discussed with her that anticoagulation will be lifelong.

## 2020-03-17 ENCOUNTER — TELEPHONE (OUTPATIENT)
Dept: HEMATOLOGY/ONCOLOGY | Facility: CLINIC | Age: 40
End: 2020-03-17

## 2020-03-17 ENCOUNTER — PATIENT MESSAGE (OUTPATIENT)
Dept: HEMATOLOGY/ONCOLOGY | Facility: CLINIC | Age: 40
End: 2020-03-17

## 2020-03-17 NOTE — TELEPHONE ENCOUNTER
----- Message from Anabell Mehtaraymundo sent at 3/17/2020  2:27 PM CDT -----  Contact: pt at [phone]  Edilberto franco-Pt is returning a call to verify her appt tomorrow.  She will be here.    Pt is asking for an earlier appt if you have one.Thanks.

## 2020-03-18 ENCOUNTER — OFFICE VISIT (OUTPATIENT)
Dept: HEMATOLOGY/ONCOLOGY | Facility: CLINIC | Age: 40
End: 2020-03-18
Payer: COMMERCIAL

## 2020-03-18 DIAGNOSIS — D68.9 COAGULATION DEFECT: Primary | ICD-10-CM

## 2020-03-18 PROCEDURE — 99204 PR OFFICE/OUTPT VISIT, NEW, LEVL IV, 45-59 MIN: ICD-10-PCS | Mod: 95,,, | Performed by: INTERNAL MEDICINE

## 2020-03-18 PROCEDURE — 99204 OFFICE O/P NEW MOD 45 MIN: CPT | Mod: 95,,, | Performed by: INTERNAL MEDICINE

## 2020-03-18 NOTE — LETTER
March 25, 2020      Yael Galan, KRISTEN  [address] [facility] [address]           [facility]  [address]  [facility] [address]  Phone: [phone]          Patient: Jose Hi   MR Number: [id number]   YOB: 1980   Date of Visit: 3/18/2020       Dear Yael Galan:    Thank you for referring Jose Hi to me for evaluation. Attached you will find relevant portions of my assessment and plan of care.    If you have questions, please do not hesitate to call me. I look forward to following Jose Hi along with you.    Sincerely,    Marie Casanova MD    Enclosure  CC:  No Recipients    If you would like to receive this communication electronically, please contact [email or web address] or [phone] to request more information on [employer] access.    For providers and/or their staff who would like to refer a patient to [facility], please contact us through our one-stop-shop provider referral line, Brendon Marcelo, at [phone].    If you feel you have received this communication in error or would no longer like to receive these types of communications, please e-mail [email or web address]

## 2020-03-23 ENCOUNTER — TELEPHONE (OUTPATIENT)
Dept: HEMATOLOGY/ONCOLOGY | Facility: CLINIC | Age: 40
End: 2020-03-23

## 2020-03-24 ENCOUNTER — TELEPHONE (OUTPATIENT)
Dept: HEMATOLOGY/ONCOLOGY | Facility: CLINIC | Age: 40
End: 2020-03-24

## 2020-03-25 NOTE — PROGRESS NOTES
Subjective:       Patient ID: Jose Hi is a 39 y.o. female.    Chief Complaint: Coagulation Disorder    Telephone visit for recurrent PE.  History of PE in September 2019 and treatment with limited course of Eliquis.   Prior use of OCP.   Recurrent event off Eliquis  No family history of thrombosis  No adverse events on prior or current anticoagulation    HPI  Review of Systems   Constitutional: Negative.    HENT: Negative.    Eyes: Negative.    Respiratory: Negative.    Cardiovascular: Negative.    Gastrointestinal: Negative.    Endocrine: Negative.    Genitourinary: Negative.    Musculoskeletal: Negative.    Skin: Negative.    Allergic/Immunologic: Negative for environmental allergies, food allergies and immunocompromised state.   Neurological: Negative.    Hematological: Negative for adenopathy. Does not bruise/bleed easily.   Psychiatric/Behavioral: Negative.        Objective:    Deferred for telephone visit during COVID 19  Physical Exam    Assessment:       1. Coagulation defect        Plan:       No indication for thrombophilia evaluation as recommendation will be lifelong anticoagulation no matter results.  Plan for return visit in 6 months to assess benefit risk of ongoing anticoagulation therapy

## 2020-04-09 ENCOUNTER — PATIENT MESSAGE (OUTPATIENT)
Dept: FAMILY MEDICINE | Facility: CLINIC | Age: 40
End: 2020-04-09

## 2020-04-12 ENCOUNTER — PATIENT OUTREACH (OUTPATIENT)
Dept: ADMINISTRATIVE | Facility: HOSPITAL | Age: 40
End: 2020-04-12

## 2020-04-12 NOTE — PROGRESS NOTES
Immunizations reviewed. [facility] reviewed. Care Everywhere reviewed. [facility] reviewed and PAP Smear from 2015 uploaded to . Portal message sent to patient. Chart review completed.          [facility] report  Chart scrubbed 04.12.20

## 2020-04-16 ENCOUNTER — PATIENT MESSAGE (OUTPATIENT)
Dept: FAMILY MEDICINE | Facility: CLINIC | Age: 40
End: 2020-04-16

## 2020-04-17 NOTE — TELEPHONE ENCOUNTER
Spoke with pt - pt states that her employer switched insurance companies and the new insurance that she has wants a copay of $750 for her dexcom. Pt states that a dexcom rep states that she can assist pt with getting her dexcom supplies for free or at a discounted rate but she needs some information from Dr. Hi.     Called Dex[employer] rep Meredith Wilkinson at [phone] and left message on her voicemail to return call. NADEEN

## 2020-06-09 ENCOUNTER — TELEPHONE (OUTPATIENT)
Dept: FAMILY MEDICINE | Facility: CLINIC | Age: 40
End: 2020-06-09

## 2020-06-09 NOTE — TELEPHONE ENCOUNTER
----- Message from Mirlande Chinchilla sent at 6/9/2020  3:19 PM CDT -----  Pt wants to keep her regular appt for 06/16 instead of a virtual visit. Pt does not have a smart phone. Pt #[phone]

## 2020-06-11 LAB
ALBUMIN SERPL-MCNC: 3.7 G/DL (ref 3.6–5.1)
ALBUMIN/GLOB SERPL: 1.2 (CALC) (ref 1–2.5)
ALP SERPL-CCNC: 35 U/L (ref 31–125)
ALT SERPL-CCNC: 10 U/L (ref 6–29)
AST SERPL-CCNC: 9 U/L (ref 10–30)
BASOPHILS # BLD AUTO: 32 CELLS/UL (ref 0–200)
BASOPHILS NFR BLD AUTO: 0.6 %
BILIRUB SERPL-MCNC: 0.3 MG/DL (ref 0.2–1.2)
BUN SERPL-MCNC: 7 MG/DL (ref 7–25)
BUN/CREAT SERPL: ABNORMAL (CALC) (ref 6–22)
CALCIUM SERPL-MCNC: 9.6 MG/DL (ref 8.6–10.2)
CHLORIDE SERPL-SCNC: 103 MMOL/L (ref 98–110)
CO2 SERPL-SCNC: 28 MMOL/L (ref 20–32)
CREAT SERPL-MCNC: 0.89 MG/DL (ref 0.5–1.1)
EOSINOPHIL # BLD AUTO: 58 CELLS/UL (ref 15–500)
EOSINOPHIL NFR BLD AUTO: 1.1 %
ERYTHROCYTE [DISTWIDTH] IN BLOOD BY AUTOMATED COUNT: 13.2 % (ref 11–15)
GFRSERPLBLD MDRD-ARVRAT: 82 ML/MIN/1.73M2
GLOBULIN SER CALC-MCNC: 3.1 G/DL (CALC) (ref 1.9–3.7)
GLUCOSE SERPL-MCNC: 191 MG/DL (ref 65–99)
HBA1C MFR BLD: 9.7 % OF TOTAL HGB
HCT VFR BLD AUTO: 42.3 % (ref 35–45)
HGB BLD-MCNC: 13.1 G/DL (ref 11.7–15.5)
LYMPHOCYTES # BLD AUTO: 2152 CELLS/UL (ref 850–3900)
LYMPHOCYTES NFR BLD AUTO: 40.6 %
MCH RBC QN AUTO: 26.3 PG (ref 27–33)
MCHC RBC AUTO-ENTMCNC: 31 G/DL (ref 32–36)
MCV RBC AUTO: 84.9 FL (ref 80–100)
MONOCYTES # BLD AUTO: 498 CELLS/UL (ref 200–950)
MONOCYTES NFR BLD AUTO: 9.4 %
NEUTROPHILS # BLD AUTO: 2560 CELLS/UL (ref 1500–7800)
NEUTROPHILS NFR BLD AUTO: 48.3 %
PLATELET # BLD AUTO: 260 THOUSAND/UL (ref 140–400)
PMV BLD REES-ECKER: 10.2 FL (ref 7.5–12.5)
POTASSIUM SERPL-SCNC: 5.1 MMOL/L (ref 3.5–5.3)
PROT SERPL-MCNC: 6.8 G/DL (ref 6.1–8.1)
RBC # BLD AUTO: 4.98 MILLION/UL (ref 3.8–5.1)
SODIUM SERPL-SCNC: 135 MMOL/L (ref 135–146)
WBC # BLD AUTO: 5.3 THOUSAND/UL (ref 3.8–10.8)

## 2020-06-15 ENCOUNTER — TELEPHONE (OUTPATIENT)
Dept: HEMATOLOGY/ONCOLOGY | Facility: CLINIC | Age: 40
End: 2020-06-15

## 2020-06-16 ENCOUNTER — OFFICE VISIT (OUTPATIENT)
Dept: HEMATOLOGY/ONCOLOGY | Facility: CLINIC | Age: 40
End: 2020-06-16
Payer: COMMERCIAL

## 2020-06-16 ENCOUNTER — OFFICE VISIT (OUTPATIENT)
Dept: FAMILY MEDICINE | Facility: CLINIC | Age: 40
End: 2020-06-16
Payer: COMMERCIAL

## 2020-06-16 ENCOUNTER — OFFICE VISIT (OUTPATIENT)
Dept: PULMONOLOGY | Facility: CLINIC | Age: 40
End: 2020-06-16
Payer: COMMERCIAL

## 2020-06-16 VITALS
WEIGHT: 275 LBS | HEART RATE: 72 BPM | HEIGHT: 69 IN | TEMPERATURE: 99 F | BODY MASS INDEX: 40.73 KG/M2 | SYSTOLIC BLOOD PRESSURE: 102 MMHG | DIASTOLIC BLOOD PRESSURE: 64 MMHG

## 2020-06-16 VITALS
BODY MASS INDEX: 41.97 KG/M2 | SYSTOLIC BLOOD PRESSURE: 133 MMHG | WEIGHT: 276 LBS | HEART RATE: 77 BPM | OXYGEN SATURATION: 96 % | DIASTOLIC BLOOD PRESSURE: 68 MMHG | TEMPERATURE: 98 F | RESPIRATION RATE: 16 BRPM

## 2020-06-16 VITALS
DIASTOLIC BLOOD PRESSURE: 59 MMHG | OXYGEN SATURATION: 98 % | HEIGHT: 69 IN | BODY MASS INDEX: 40.7 KG/M2 | SYSTOLIC BLOOD PRESSURE: 115 MMHG | HEART RATE: 75 BPM | WEIGHT: 274.81 LBS

## 2020-06-16 DIAGNOSIS — Z79.4 TYPE 2 DIABETES MELLITUS WITH HYPERGLYCEMIA, WITH LONG-TERM CURRENT USE OF INSULIN: Primary | ICD-10-CM

## 2020-06-16 DIAGNOSIS — I26.99 ACUTE PULMONARY EMBOLISM WITHOUT ACUTE COR PULMONALE, UNSPECIFIED PULMONARY EMBOLISM TYPE: ICD-10-CM

## 2020-06-16 DIAGNOSIS — I82.469 DEEP VEIN THROMBOSIS (DVT) OF CALF MUSCLE VEIN, UNSPECIFIED CHRONICITY, UNSPECIFIED LATERALITY: ICD-10-CM

## 2020-06-16 DIAGNOSIS — Z79.01 LONG TERM (CURRENT) USE OF ANTICOAGULANTS: ICD-10-CM

## 2020-06-16 DIAGNOSIS — E11.65 TYPE 2 DIABETES MELLITUS WITH HYPERGLYCEMIA, WITH LONG-TERM CURRENT USE OF INSULIN: Primary | ICD-10-CM

## 2020-06-16 DIAGNOSIS — G47.33 OSA (OBSTRUCTIVE SLEEP APNEA): ICD-10-CM

## 2020-06-16 DIAGNOSIS — Z86.718 HISTORY OF BLOOD CLOTS: ICD-10-CM

## 2020-06-16 PROBLEM — R06.09 DOE (DYSPNEA ON EXERTION): Status: RESOLVED | Noted: 2019-05-15 | Resolved: 2020-06-16

## 2020-06-16 PROBLEM — Z75.8 DISCHARGE PLANNING ISSUES: Status: RESOLVED | Noted: 2019-03-25 | Resolved: 2020-06-16

## 2020-06-16 PROCEDURE — 99212 OFFICE O/P EST SF 10 MIN: CPT | Mod: S$GLB,,, | Performed by: NURSE PRACTITIONER

## 2020-06-16 PROCEDURE — 3008F PR BODY MASS INDEX (BMI) DOCUMENTED: ICD-10-PCS | Mod: CPTII,S$GLB,, | Performed by: NURSE PRACTITIONER

## 2020-06-16 PROCEDURE — 99214 OFFICE O/P EST MOD 30 MIN: CPT | Mod: S$GLB,,, | Performed by: INTERNAL MEDICINE

## 2020-06-16 PROCEDURE — 99999 PR PBB SHADOW E&M-EST. PATIENT-LVL III: ICD-10-PCS | Mod: PBBFAC,,, | Performed by: INTERNAL MEDICINE

## 2020-06-16 PROCEDURE — 3008F PR BODY MASS INDEX (BMI) DOCUMENTED: ICD-10-PCS | Mod: CPTII,S$GLB,, | Performed by: INTERNAL MEDICINE

## 2020-06-16 PROCEDURE — 3046F PR MOST RECENT HEMOGLOBIN A1C LEVEL > 9.0%: ICD-10-PCS | Mod: S$GLB,,, | Performed by: FAMILY MEDICINE

## 2020-06-16 PROCEDURE — 99214 OFFICE O/P EST MOD 30 MIN: CPT | Mod: S$GLB,,, | Performed by: FAMILY MEDICINE

## 2020-06-16 PROCEDURE — 3046F HEMOGLOBIN A1C LEVEL >9.0%: CPT | Mod: S$GLB,,, | Performed by: FAMILY MEDICINE

## 2020-06-16 PROCEDURE — 99999 PR PBB SHADOW E&M-EST. PATIENT-LVL V: CPT | Mod: PBBFAC,,, | Performed by: NURSE PRACTITIONER

## 2020-06-16 PROCEDURE — 3008F BODY MASS INDEX DOCD: CPT | Mod: CPTII,S$GLB,, | Performed by: NURSE PRACTITIONER

## 2020-06-16 PROCEDURE — 99999 PR PBB SHADOW E&M-EST. PATIENT-LVL V: ICD-10-PCS | Mod: PBBFAC,,, | Performed by: NURSE PRACTITIONER

## 2020-06-16 PROCEDURE — 3008F BODY MASS INDEX DOCD: CPT | Mod: S$GLB,,, | Performed by: FAMILY MEDICINE

## 2020-06-16 PROCEDURE — 99999 PR PBB SHADOW E&M-EST. PATIENT-LVL III: CPT | Mod: PBBFAC,,, | Performed by: INTERNAL MEDICINE

## 2020-06-16 PROCEDURE — 99212 PR OFFICE/OUTPT VISIT, EST, LEVL II, 10-19 MIN: ICD-10-PCS | Mod: S$GLB,,, | Performed by: NURSE PRACTITIONER

## 2020-06-16 PROCEDURE — 3008F PR BODY MASS INDEX (BMI) DOCUMENTED: ICD-10-PCS | Mod: S$GLB,,, | Performed by: FAMILY MEDICINE

## 2020-06-16 PROCEDURE — 99214 PR OFFICE/OUTPT VISIT, EST, LEVL IV, 30-39 MIN: ICD-10-PCS | Mod: S$GLB,,, | Performed by: FAMILY MEDICINE

## 2020-06-16 PROCEDURE — 3008F BODY MASS INDEX DOCD: CPT | Mod: CPTII,S$GLB,, | Performed by: INTERNAL MEDICINE

## 2020-06-16 PROCEDURE — 99214 PR OFFICE/OUTPT VISIT, EST, LEVL IV, 30-39 MIN: ICD-10-PCS | Mod: S$GLB,,, | Performed by: INTERNAL MEDICINE

## 2020-06-16 NOTE — PROGRESS NOTES
Subjective:       Patient ID: Jose Hi is a 40 y.o. female.    Chief Complaint: Follow-up    HPI:   Jose Hi is a 40 y.o. female who presents with follow up after diagnosis of another PE in March 2020.  She is not followed by heme/onc annually and will remain on anticoagulation for life as there were no known contributors to this PE (she had been on OCP prior to the initial event)  She is doing well with her DM, Walking 1.75 miles daily M-F  Needs CPAP    Review of Systems   Constitutional: Negative for fever, chills, fatigue and night sweats.   HENT: Negative for postnasal drip and congestion.    Respiratory: Negative for sputum production, chest tightness, wheezing, dyspnea on extertion and use of rescue inhaler.    Cardiovascular: Negative for leg swelling.   Psychiatric/Behavioral: Negative for sleep disturbance.         Social History     Tobacco Use    Smoking status: Never Smoker    Smokeless tobacco: Never Used   Substance Use Topics    Alcohol use: Yes     Comment: socially       Review of patient's allergies indicates:   Allergen Reactions    Contrast media      Past Medical History:   Diagnosis Date    Diabetes     History of pulmonary embolus (PE)     March 2019     Past Surgical History:   Procedure Laterality Date    FRACTURE SURGERY      RIGHT HEART CATHETERIZATION N/A 3/22/2019    Procedure: INSERTION, CATHETER, RIGHT HEART;  Surgeon: Andres Cates MD;  Location: [facility] CATH LAB;  Service: Cardiology;  Laterality: N/A;    TUBAL LIGATION       Current Outpatient Medications on File Prior to Visit   Medication Sig    blood sugar diagnostic Strp Test 4 (four) times daily.    blood-glucose meter kit Use as instructed    ELIQUIS 5 mg Tab Take 1 tablet (5 mg total) by mouth 2 (two) times daily.    insulin aspart U-100 (NOVOLOG FLEXPEN U-100 INSULIN) 100 unit/mL (3 mL) InPn pen 19 Units 3 (three) times daily with meals.     insulin detemir U-100 (LEVEMIR FLEXTOUCH U-100  "INSULN) 100 unit/mL (3 mL) SubQ InPn pen Inject 40 Units into the skin once daily. (Patient taking differently: Inject 20 Units into the skin 2 (two) times daily. )    lancets (ACCU-CHEK MULTICLIX LANCET) Misc 1 each by Misc.(Non-Drug; Combo Route) route 3 (three) times daily.    lisinopriL (PRINIVIL,ZESTRIL) 2.5 MG tablet Take 1 tablet (2.5 mg total) by mouth once daily.    pen needle, diabetic (BD ULTRA-FINE LEANA PEN NEEDLE) 32 gauge x 5/32" Ndle Use with insulin QID    pen needle, diabetic (BD ULTRA-FINE LEANA PEN NEEDLE) 32 gauge x 5/32" Ndle use with pens    simvastatin (ZOCOR) 5 MG tablet Take 2 tablets (10 mg total) by mouth every evening.    DEXCOM G6  Misc USE  D UTD    DEXCOM G6 SENSOR Dory USE 1 SENSOR EVERY 10 DAYS    DEXCOM G6 TRANSMITTER Dory USE 1 EVERY 3 MONTHS AS DIRECTED    insulin lispro (HUMALOG KWIKPEN INSULIN) 100 unit/mL pen Inject 10 units subq with biggest meal of the day (Patient not taking: Reported on 6/16/2020)     No current facility-administered medications on file prior to visit.        Objective:      Vitals:    06/16/20 1435   BP: (!) 115/59   Pulse: 75     Physical Exam   Constitutional: She is oriented to person, place, and time. She appears well-developed and well-nourished. No distress. She is obese.   HENT:   Head: Normocephalic.   Neck: Normal range of motion. Neck supple.   Cardiovascular: Normal rate.   Pulmonary/Chest: Normal expansion. No respiratory distress. She has no decreased breath sounds. She has no wheezes. She has no rales.   Abdominal: Soft.   Musculoskeletal: Normal range of motion.         General: No edema.   Lymphadenopathy:     She has no axillary adenopathy.   Neurological: She is alert and oriented to person, place, and time. Gait normal.   Skin: Skin is warm and dry. She is not diaphoretic. No erythema. Nails show no clubbing.   Psychiatric: She has a normal mood and affect.   Nursing note and vitals reviewed.    Personal Diagnostic " Review    Imaging personally reviewed with patient CT 3/10/2020        Assessment:       Problem List Items Addressed This Visit        Hematology    Pulmonary embolism without acute cor pulmonale    Overview     Noted on CTA 3/6/2020.  Had been off of Eliquis for several months and began having calf pain.    Stable on Eliquis  Followed by Heme/Onc               Current Assessment & Plan     As now            Other    NABILA (obstructive sleep apnea)    Overview     Had sleep study- needs to  equipment  Message sent to see if orders are still valid         Current Assessment & Plan     Per sleep medicine

## 2020-06-16 NOTE — PROGRESS NOTES
SUBJECTIVE:    Patient ID: Jose Hi is a 40 y.o. female.    Chief Complaint: DM Check Up    Patient with DM FBS 170s  , evenings are good. Has noted to be skipping her levemir due to normal bedtime BS . A1c improved but still needs work. Watching dite and staying active  No weight gain. Has been losing inches .   H/o DVT seeing heme staying on Eliquis  . Has had 2 episodes of nonprovoked DVTs.   Sees pulm today due to h/o PE no SOB        Office Visit on 03/12/2020   Component Date Value Ref Range Status    Hemoglobin A1C 03/12/2020 11.4  % Final   Admission on 03/06/2020, Discharged on 03/06/2020   Component Date Value Ref Range Status    WBC 03/06/2020 6.10  3.90 - 12.70 K/uL Final    RBC 03/06/2020 4.80  4.00 - 5.40 M/uL Final    Hemoglobin 03/06/2020 12.8  12.0 - 16.0 g/dL Final    Hematocrit 03/06/2020 39.8  37.0 - 48.5 % Final    Mean Corpuscular Volume 03/06/2020 83  82 - 98 fL Final    Mean Corpuscular Hemoglobin 03/06/2020 26.7* 27.0 - 31.0 pg Final    Mean Corpuscular Hemoglobin Conc 03/06/2020 32.3  32.0 - 36.0 g/dL Final    RDW 03/06/2020 14.3  11.5 - 14.5 % Final    Platelets 03/06/2020 225  150 - 350 K/uL Final    MPV 03/06/2020 8.5* 9.2 - 12.9 fL Final    Gran # (ANC) 03/06/2020 3.1  1.8 - 7.7 K/uL Final    Lymph # 03/06/2020 2.2  1.0 - 4.8 K/uL Final    Mono # 03/06/2020 0.5  0.3 - 1.0 K/uL Final    Eos # 03/06/2020 0.3  0.0 - 0.5 K/uL Final    Baso # 03/06/2020 0.10  0.00 - 0.20 K/uL Final    Gran% 03/06/2020 50.4  38.0 - 73.0 % Final    Lymph% 03/06/2020 36.2  18.0 - 48.0 % Final    Mono% 03/06/2020 8.0  4.0 - 15.0 % Final    Eosinophil% 03/06/2020 4.4  0.0 - 8.0 % Final    Basophil% 03/06/2020 1.0  0.0 - 1.9 % Final    Differential Method 03/06/2020 Automated   Final    Sodium 03/06/2020 136  136 - 145 mmol/L Final    Potassium 03/06/2020 4.1  3.5 - 5.1 mmol/L Final    Chloride 03/06/2020 100* 101 - 111 mmol/L Final    CO2 03/06/2020 27  23 - 29 mmol/L Final     Glucose 03/06/2020 193* 74 - 118 mg/dL Final    BUN, Bld 03/06/2020 7  6 - 20 mg/dL Final    Creatinine 03/06/2020 0.8  0.5 - 1.4 mg/dL Final    Calcium 03/06/2020 9.4  8.6 - 10.0 mg/dL Final    Total Protein 03/06/2020 7.3  6.0 - 8.4 g/dL Final    Albumin 03/06/2020 3.6  3.5 - 5.2 g/dL Final    Total Bilirubin 03/06/2020 0.5  0.3 - 1.2 mg/dL Final    Alkaline Phosphatase 03/06/2020 49  38 - 126 U/L Final    AST 03/06/2020 15  15 - 41 U/L Final    ALT 03/06/2020 19  14 - 54 U/L Final    Anion Gap 03/06/2020 9  8 - 16 mmol/L Final    eGFR if African American 03/06/2020 >60.0  >60 mL/min/1.73 m^2 Final    eGFR if non African American 03/06/2020 >60.0  >60 mL/min/1.73 m^2 Final    Troponin I 03/06/2020 <0.01  0.01 - 0.05 ng/mL Final    Prothrombin Time 03/06/2020 10.3  9.0 - 12.5 sec Final    INR 03/06/2020 1.0  0.8 - 1.2 Final    BNP 03/06/2020 53  0 - 99 pg/mL Final    D-Dimer 03/06/2020 1.99* <0.50 mg/L FEU Final    POC Preg Test, Ur 03/06/2020 Negative  Negative Final     Acceptable 03/06/2020 Yes   Final   Lab Visit on 03/06/2020   Component Date Value Ref Range Status    D-Dimer 03/06/2020 1.94* <0.50 mg/L FEU Final   Telephone on 03/04/2020   Component Date Value Ref Range Status    Hemoglobin A1C 06/10/2020 9.7* <5.7 % of total Hgb Final    Glucose 06/10/2020 191* 65 - 99 mg/dL Final    BUN, Bld 06/10/2020 7  7 - 25 mg/dL Final    Creatinine 06/10/2020 0.89  0.50 - 1.10 mg/dL Final    eGFR if non African American 06/10/2020 82  > OR = 60 mL/min/1.73m2 Final    eGFR if African American 06/10/2020 95  > OR = 60 mL/min/1.73m2 Final    BUN/Creatinine Ratio 06/10/2020 NOT APPLICABLE  6 - 22 (calc) Final    Sodium 06/10/2020 135  135 - 146 mmol/L Final    Potassium 06/10/2020 5.1  3.5 - 5.3 mmol/L Final    Chloride 06/10/2020 103  98 - 110 mmol/L Final    CO2 06/10/2020 28  20 - 32 mmol/L Final    Calcium 06/10/2020 9.6  8.6 - 10.2 mg/dL Final    Total Protein  06/10/2020 6.8  6.1 - 8.1 g/dL Final    Albumin 06/10/2020 3.7  3.6 - 5.1 g/dL Final    Globulin, Total 06/10/2020 3.1  1.9 - 3.7 g/dL (calc) Final    Albumin/Globulin Ratio 06/10/2020 1.2  1.0 - 2.5 (calc) Final    Total Bilirubin 06/10/2020 0.3  0.2 - 1.2 mg/dL Final    Alkaline Phosphatase 06/10/2020 35  31 - 125 U/L Final    AST 06/10/2020 9* 10 - 30 U/L Final    ALT 06/10/2020 10  6 - 29 U/L Final    Cholesterol 03/12/2020 180  <200 mg/dL Final    HDL 03/12/2020 44* > OR = 50 mg/dL Final    Triglycerides 03/12/2020 193* <150 mg/dL Final    LDL Cholesterol 03/12/2020 105* mg/dL (calc) Final    Hdl/Cholesterol Ratio 03/12/2020 4.1  <5.0 (calc) Final    Non HDL Chol. (LDL+VLDL) 03/12/2020 136* <130 mg/dL (calc) Final    Creatinine, Random Ur 03/12/2020 121  20 - 275 mg/dL Final    Microalb, Ur 03/12/2020 0.4  See Note: mg/dL Final    Microalb Creat Ratio 03/12/2020 3  <30 mcg/mg creat Final    HIV Ag/Ab 4th Gen 03/12/2020 NON-REACTIVE  NON-REACTIVE Final    WBC 06/10/2020 5.3  3.8 - 10.8 Thousand/uL Final    RBC 06/10/2020 4.98  3.80 - 5.10 Million/uL Final    Hemoglobin 06/10/2020 13.1  11.7 - 15.5 g/dL Final    Hematocrit 06/10/2020 42.3  35.0 - 45.0 % Final    Mean Corpuscular Volume 06/10/2020 84.9  80.0 - 100.0 fL Final    Mean Corpuscular Hemoglobin 06/10/2020 26.3* 27.0 - 33.0 pg Final    Mean Corpuscular Hemoglobin Conc 06/10/2020 31.0* 32.0 - 36.0 g/dL Final    RDW 06/10/2020 13.2  11.0 - 15.0 % Final    Platelets 06/10/2020 260  140 - 400 Thousand/uL Final    MPV 06/10/2020 10.2  7.5 - 12.5 fL Final    Neutrophils Absolute 06/10/2020 2,560  1,500 - 7,800 cells/uL Final    Lymph # 06/10/2020 2,152  850 - 3,900 cells/uL Final    Mono # 06/10/2020 498  200 - 950 cells/uL Final    Eos # 06/10/2020 58  15 - 500 cells/uL Final    Baso # 06/10/2020 32  0 - 200 cells/uL Final    Neutrophils Relative 06/10/2020 48.3  % Final    Lymph% 06/10/2020 40.6  % Final    Mono% 06/10/2020  9.4  % Final    Eosinophil% 06/10/2020 1.1  % Final    Basophil% 06/10/2020 0.6  % Final    TSH w/reflex to FT4 03/12/2020 0.97  mIU/L Final    RPR (Dx) w/Refl Titer and Confirma* 03/12/2020 NON-REACTIVE  NON-REACTIVE Final   Lab Visit on 01/08/2020   Component Date Value Ref Range Status    D-Dimer 01/08/2020 0.38  <0.50 mg/L FEU Final       Past Medical History:   Diagnosis Date    Diabetes     History of pulmonary embolus (PE)     March 2019     Past Surgical History:   Procedure Laterality Date    FRACTURE SURGERY      RIGHT HEART CATHETERIZATION N/A 3/22/2019    Procedure: INSERTION, CATHETER, RIGHT HEART;  Surgeon: Andres Cates MD;  Location[facility] CATH LAB;  Service: Cardiology;  Laterality: N/A;    TUBAL LIGATION       History reviewed. No pertinent family history.    Marital Status: Legally   Alcohol History:  reports current alcohol use.  Tobacco History:  reports that she has never smoked. She has never used smokeless tobacco.  Drug History:  reports no history of drug use.    Review of patient's allergies indicates:   Allergen Reactions    Contrast media        Current Outpatient Medications:     blood sugar diagnostic Strp, Test 4 (four) times daily., Disp: 150 strip, Rfl: 0    DEXCOM G6  Misc, USE  D UTD, Disp: , Rfl: 0    DEXCOM G6 SENSOR Dory, USE 1 SENSOR EVERY 10 DAYS, Disp: , Rfl: 0    DEXCOM G6 TRANSMITTER Dory, USE 1 EVERY 3 MONTHS AS DIRECTED, Disp: , Rfl: 3    ELIQUIS 5 mg Tab, Take 1 tablet (5 mg total) by mouth 2 (two) times daily., Disp: 60 tablet, Rfl: 12    insulin aspart U-100 (NOVOLOG FLEXPEN U-100 INSULIN) 100 unit/mL (3 mL) InPn pen, 19 Units 3 (three) times daily with meals. , Disp: , Rfl:     insulin detemir U-100 (LEVEMIR FLEXTOUCH U-100 INSULN) 100 unit/mL (3 mL) SubQ InPn pen, Inject 40 Units into the skin once daily. (Patient taking differently: Inject 20 Units into the skin 2 (two) times daily. ), Disp: 15 mL, Rfl: 3    lancets  "(ACCU-CHEK MULTICLIX LANCET) Misc, 1 each by Misc.(Non-Drug; Combo Route) route 3 (three) times daily., Disp: 200 each, Rfl: 6    lisinopriL (PRINIVIL,ZESTRIL) 2.5 MG tablet, Take 1 tablet (2.5 mg total) by mouth once daily., Disp: 30 tablet, Rfl: 12    pen needle, diabetic (BD ULTRA-FINE LEANA PEN NEEDLE) 32 gauge x 5/32" Ndle, Use with insulin QID, Disp: 200 each, Rfl: 3    pen needle, diabetic (BD ULTRA-FINE LEANA PEN NEEDLE) 32 gauge x 5/32" Ndle, use with pens, Disp: , Rfl:     simvastatin (ZOCOR) 5 MG tablet, Take 2 tablets (10 mg total) by mouth every evening., Disp: 30 tablet, Rfl: 12    blood-glucose meter kit, Use as instructed, Disp: 1 each, Rfl: 0    insulin lispro (HUMALOG KWIKPEN INSULIN) 100 unit/mL pen, Inject 10 units subq with biggest meal of the day (Patient not taking: Reported on 6/16/2020), Disp: 15 mL, Rfl: 3    Review of Systems   Constitutional: Negative for activity change, fatigue and unexpected weight change.   HENT: Negative for hearing loss, postnasal drip, sinus pressure, sore throat and voice change.    Eyes: Negative for photophobia and visual disturbance.   Respiratory: Negative for cough, shortness of breath and wheezing.    Cardiovascular: Negative for chest pain and palpitations.   Gastrointestinal: Negative for constipation, diarrhea and nausea.   Genitourinary: Negative for difficulty urinating, frequency, hematuria and urgency.   Musculoskeletal: Negative for arthralgias and back pain.   Skin: Negative for rash.   Neurological: Negative for weakness, light-headedness and headaches.   Hematological: Negative for adenopathy. Does not bruise/bleed easily.   Psychiatric/Behavioral: The patient is not nervous/anxious.           Objective:      Vitals:    06/16/20 0902   BP: 102/64   Pulse: 72   Temp: 98.7 °F (37.1 °C)   Weight: 124.7 kg (275 lb)   Height: 5' 8.5" (1.74 m)     Physical Exam  Vitals signs reviewed.   Constitutional:       General: She is not in acute distress.    "  Appearance: She is well-developed.   HENT:      Head: Normocephalic and atraumatic.      Right Ear: Tympanic membrane and external ear normal.      Left Ear: Tympanic membrane and external ear normal.      Mouth/Throat:      Mouth: Mucous membranes are moist.   Eyes:      General: Lids are normal.      Conjunctiva/sclera: Conjunctivae normal.      Pupils: Pupils are equal, round, and reactive to light.   Neck:      Musculoskeletal: Full passive range of motion without pain and neck supple.      Thyroid: No thyromegaly.      Vascular: No JVD.      Trachea: No tracheal deviation.   Cardiovascular:      Rate and Rhythm: Normal rate and regular rhythm.      Chest Wall: PMI is not displaced.      Pulses: Normal pulses.      Heart sounds: Normal heart sounds.   Pulmonary:      Effort: Pulmonary effort is normal.      Breath sounds: Normal breath sounds.   Abdominal:      General: Bowel sounds are normal.      Palpations: Abdomen is soft.      Tenderness: There is no abdominal tenderness. There is no guarding or rebound.   Musculoskeletal: Normal range of motion.         General: No tenderness.   Skin:     General: Skin is warm and dry.      Findings: No rash.   Neurological:      General: No focal deficit present.      Mental Status: She is alert and oriented to person, place, and time.   Psychiatric:         Mood and Affect: Mood normal.         Behavior: Behavior normal.           Assessment:       1. Type 2 diabetes mellitus with hyperglycemia, with long-term current use of insulin    2. NABILA (obstructive sleep apnea)    3. Deep vein thrombosis (DVT) of calf muscle vein, unspecified chronicity, unspecified laterality    4. Long term (current) use of anticoagulants         Plan:       Type 2 diabetes mellitus with hyperglycemia, with long-term current use of insulin  Comments:  Improved.  Insulin management discussed    NABILA (obstructive sleep apnea)  Comments:  Needs to schedule for titration study    Deep vein  thrombosis (DVT) of calf muscle vein, unspecified chronicity, unspecified laterality  Comments:  Continue on Eliquis    Long term (current) use of anticoagulants      Follow up in about 3 months (around 9/16/2020) for Diabetic Check-Up.

## 2020-06-16 NOTE — LETTER
June 16, 2020      Yael Galan, KRISTEN  [address] [facility] [facility] [address]           [facility]  [address]  [facility] [address]  Phone: [phone]          Patient: Jose Hi   MR Number: [id number]   YOB: 1980   Date of Visit: 6/16/2020       Dear Yael Galan:    Thank you for referring Jose Hi to me for evaluation. Attached you will find relevant portions of my assessment and plan of care.    If you have questions, please do not hesitate to call me. I look forward to following Jose Hi along with you.    Sincerely,    Marie Casanova MD    Enclosure  CC:  No Recipients    If you would like to receive this communication electronically, please contact [email or web address] or [phone] to request more information on [facility] Link access.    For providers and/or their staff who would like to refer a patient to [facility], please contact us through our one-stop-shop provider referral line, Brendon Marcelo, at [phone].    If you feel you have received this communication in error or would no longer like to receive these types of communications, please e-mail [email or web address]

## 2020-06-16 NOTE — PROGRESS NOTES
Subjective:       Patient ID: Jose Hi is a 40 y.o. female.    Chief Complaint: Coagulation defect    HPI     Follow-up visit for recurrent DVT/PE.  First DVT year prior, risk factor was OCP use (prescribed for menorrhagia).  Second DVT with PE was March 2020, risk factor was prior thrombosis. Restarted therapy with Eliquis.    No adverse side effects on full dose anticoagulation  She is a bit concerned that when she returns to work (2 jobs) she may forget to take her medications.    Review of Systems   Constitutional: Negative.    HENT: Negative.    Eyes: Negative.    Respiratory: Negative.    Cardiovascular: Negative.    Gastrointestinal: Negative.    Endocrine: Negative.    Genitourinary: Negative.    Musculoskeletal: Negative.    Integumentary:  Negative.   Allergic/Immunologic: Negative for environmental allergies, food allergies and immunocompromised state.   Neurological: Negative.    Hematological: Negative for adenopathy. Does not bruise/bleed easily.   Psychiatric/Behavioral: Negative.          Objective:      Physical Exam  Vitals signs and nursing note reviewed.   Constitutional:       Appearance: She is well-developed.   HENT:      Head: Normocephalic and atraumatic.   Eyes:      General: No scleral icterus.     Conjunctiva/sclera: Conjunctivae normal.   Neck:      Musculoskeletal: Normal range of motion and neck supple.   Cardiovascular:      Rate and Rhythm: Normal rate.   Pulmonary:      Effort: Pulmonary effort is normal. No respiratory distress.   Abdominal:      General: There is no distension.      Tenderness: There is no abdominal tenderness.   Musculoskeletal: Normal range of motion.   Skin:     General: Skin is warm and dry.   Neurological:      Mental Status: She is alert and oriented to person, place, and time.      Cranial Nerves: No cranial nerve deficit.   Psychiatric:         Behavior: Behavior normal.         Assessment:       1. History of blood clots        Plan:        Continue indefinite anticoagulation as long as benefit outweighs risk.    Return visit in 1  year

## 2020-06-19 ENCOUNTER — TELEPHONE (OUTPATIENT)
Dept: SLEEP MEDICINE | Facility: CLINIC | Age: 40
End: 2020-06-19

## 2020-06-19 NOTE — TELEPHONE ENCOUNTER
----- Message from Clara Garland NP sent at 6/18/2020  2:02 PM CDT -----  Have her call the equipment company and see about getting setup with apap. [phone]. Ord'd in Nov 2019.   ----- Message -----  From: Yael Galan DNP  Sent: 6/17/2020   3:52 PM CDT  To: MEERA Spence,    Our mutual patient would like to  her CPAP equipment that was ordered a few months back- are the orders still good? I wanted to double check.    Thanks,  Yael

## 2020-06-24 ENCOUNTER — OFFICE VISIT (OUTPATIENT)
Dept: OBSTETRICS AND GYNECOLOGY | Facility: CLINIC | Age: 40
End: 2020-06-24
Payer: COMMERCIAL

## 2020-06-24 VITALS
SYSTOLIC BLOOD PRESSURE: 128 MMHG | DIASTOLIC BLOOD PRESSURE: 72 MMHG | HEIGHT: 68 IN | BODY MASS INDEX: 42.8 KG/M2 | WEIGHT: 282.44 LBS

## 2020-06-24 DIAGNOSIS — Z01.419 ENCOUNTER FOR WELL WOMAN EXAM WITH ROUTINE GYNECOLOGICAL EXAM: Primary | ICD-10-CM

## 2020-06-24 PROCEDURE — 99999 PR PBB SHADOW E&M-EST. PATIENT-LVL III: CPT | Mod: PBBFAC,,, | Performed by: OBSTETRICS & GYNECOLOGY

## 2020-06-24 PROCEDURE — 88175 CYTOPATH C/V AUTO FLUID REDO: CPT | Performed by: PATHOLOGY

## 2020-06-24 PROCEDURE — 88141 PR  CYTOPATH CERV/VAG INTERPRET: ICD-10-PCS | Mod: ,,, | Performed by: PATHOLOGY

## 2020-06-24 PROCEDURE — 88141 CYTOPATH C/V INTERPRET: CPT | Mod: ,,, | Performed by: PATHOLOGY

## 2020-06-24 PROCEDURE — 99999 PR PBB SHADOW E&M-EST. PATIENT-LVL III: ICD-10-PCS | Mod: PBBFAC,,, | Performed by: OBSTETRICS & GYNECOLOGY

## 2020-06-24 PROCEDURE — 99385 PR PREVENTIVE VISIT,NEW,18-39: ICD-10-PCS | Mod: S$GLB,,, | Performed by: OBSTETRICS & GYNECOLOGY

## 2020-06-24 PROCEDURE — 99385 PREV VISIT NEW AGE 18-39: CPT | Mod: S$GLB,,, | Performed by: OBSTETRICS & GYNECOLOGY

## 2020-06-24 PROCEDURE — 87624 HPV HI-RISK TYP POOLED RSLT: CPT

## 2020-06-24 NOTE — PROGRESS NOTES
History & Physical  Gynecology      SUBJECTIVE:     Chief Complaint: Well Woman       History of Present Illness:    Jose Hi is a 40 y.o. female  here for annual routine Pap and checkup. Patient's last menstrual period was 2020.   Pt was being seen by [facility] for gyn care.  Was started on OCPs for menorrhagia and had bilateral DVT with bilateral PEs.  Pt was on Elliquis for a while and then was taken off the Elliquis.  Pt then had another episode of DVTs and (not on OCPs).  Now on Elliquis indefinitely.   Pt reports that now her menses are short and much lighter.   Last 5 days but light flow.    denies break through bleeding.   denies vaginal itching or irritation.  denies vaginal discharge.    She is sexually active with 1 partner  She uses bilateral tubal ligation for contraception.    History of abnormal pap: No  Last Pap: (12/30/15)  Last MMG: No  Last Colonoscopy:  No      Review of patient's allergies indicates:   Allergen Reactions    Contrast media        Past Medical History:   Diagnosis Date    Diabetes     History of pulmonary embolus (PE)     2019     Past Surgical History:   Procedure Laterality Date    FRACTURE SURGERY      RIGHT HEART CATHETERIZATION N/A 3/22/2019    Procedure: INSERTION, CATHETER, RIGHT HEART;  Surgeon: Andres Cates MD;  Location: [facility] CATH LAB;  Service: Cardiology;  Laterality: N/A;    TUBAL LIGATION       OB History        2    Para   2    Term   2            AB        Living   2       SAB        TAB        Ectopic        Multiple        Live Births                   Family History   Problem Relation Age of Onset    Breast cancer Neg Hx     Colon cancer Neg Hx     Ovarian cancer Neg Hx      Social History     Tobacco Use    Smoking status: Never Smoker    Smokeless tobacco: Never Used   Substance Use Topics    Alcohol use: Yes     Comment: socially    Drug use: No       Current Outpatient Medications   Medication Sig     "blood sugar diagnostic Strp Test 4 (four) times daily.    ELIQUIS 5 mg Tab Take 1 tablet (5 mg total) by mouth 2 (two) times daily.    insulin aspart U-100 (NOVOLOG FLEXPEN U-100 INSULIN) 100 unit/mL (3 mL) InPn pen 19 Units 3 (three) times daily with meals.     insulin detemir U-100 (LEVEMIR FLEXTOUCH U-100 INSULN) 100 unit/mL (3 mL) SubQ InPn pen Inject 40 Units into the skin once daily. (Patient taking differently: Inject 20 Units into the skin 2 (two) times daily. )    lancets (ACCU-CHEK MULTICLIX LANCET) Misc 1 each by Misc.(Non-Drug; Combo Route) route 3 (three) times daily.    lisinopriL (PRINIVIL,ZESTRIL) 2.5 MG tablet Take 1 tablet (2.5 mg total) by mouth once daily.    pen needle, diabetic (BD ULTRA-FINE LEANA PEN NEEDLE) 32 gauge x 5/32" Ndle Use with insulin QID    blood-glucose meter kit Use as instructed    DEXCOM G6  Misc USE  D UTD    DEXCOM G6 SENSOR Dory USE 1 SENSOR EVERY 10 DAYS    DEXCOM G6 TRANSMITTER Dory USE 1 EVERY 3 MONTHS AS DIRECTED    insulin lispro (HUMALOG KWIKPEN INSULIN) 100 unit/mL pen Inject 10 units subq with biggest meal of the day (Patient not taking: Reported on 6/16/2020)    pen needle, diabetic (BD ULTRA-FINE LEANA PEN NEEDLE) 32 gauge x 5/32" Ndle use with pens    simvastatin (ZOCOR) 5 MG tablet Take 2 tablets (10 mg total) by mouth every evening.     No current facility-administered medications for this visit.          Review of Systems:  Review of Systems   Constitutional: Negative for activity change, appetite change, chills, fatigue, fever and unexpected weight change.   Respiratory: Negative for cough, shortness of breath and wheezing.    Cardiovascular: Negative for chest pain and leg swelling.   Gastrointestinal: Negative for abdominal pain, constipation, diarrhea, nausea and vomiting.   Endocrine: Negative for hair loss and hot flashes.   Genitourinary: Negative for decreased libido, dyspareunia, dysuria, frequency, menstrual problem, pelvic pain, " vaginal bleeding, vaginal discharge and vaginal pain.   Integumentary:  Negative for acne, hair changes, nipple discharge and breast skin changes.   Neurological: Negative for headaches.   Psychiatric/Behavioral: Negative for sleep disturbance.   Breast: Negative for mastodynia, nipple discharge and skin changes       OBJECTIVE:     Physical Exam:  Physical Exam  Constitutional:       Appearance: She is well-developed.   HENT:      Head: Normocephalic and atraumatic.   Eyes:      General: No scleral icterus.        Right eye: No discharge.         Left eye: No discharge.      Conjunctiva/sclera: Conjunctivae normal.   Pulmonary:      Effort: Pulmonary effort is normal.      Breath sounds: No stridor.   Chest:      Chest wall: No mass or tenderness.      Breasts: Breasts are symmetrical.         Right: No inverted nipple, mass, nipple discharge, skin change or tenderness.         Left: No inverted nipple, mass, nipple discharge, skin change or tenderness.   Abdominal:      General: There is no distension.      Palpations: Abdomen is soft.      Tenderness: There is no abdominal tenderness.   Genitourinary:     General: Normal vulva.      Labia:         Right: No rash, tenderness, lesion or injury.         Left: No rash, tenderness, lesion or injury.       Vagina: Normal. No vaginal discharge.      Cervix: No cervical motion tenderness, discharge or friability.      Adnexa:         Right: No mass, tenderness or fullness.          Left: No mass, tenderness or fullness.     Musculoskeletal: Normal range of motion.   Skin:     General: Skin is warm and dry.   Neurological:      Mental Status: She is alert and oriented to person, place, and time.   Psychiatric:         Behavior: Behavior normal.         Thought Content: Thought content normal.         Judgment: Judgment normal.           ASSESSMENT:       ICD-10-CM ICD-9-CM    1. Encounter for well woman exam with routine gynecological exam  Z01.419 V72.31 Liquid-Based Pap  Smear, Screening      HPV High Risk Genotypes, PCR          Plan:      Jose was seen today for well woman.    Diagnoses and all orders for this visit:    Encounter for well woman exam with routine gynecological exam  -     Liquid-Based Pap Smear, Screening  -     HPV High Risk Genotypes, PCR  - cotesting today  - MMG starts at age 40  - Cscope at age 45-50  - Pt is not a candidate for estrogen therapy due to hx of DVT and PEs.  On Elliquis long term.  Discussed this with patient.      Orders Placed This Encounter   Procedures    HPV High Risk Genotypes, PCR       Follow up in about 1 year (around 6/24/2021) for annual.     Counseling time: 30 minutes    Marcelle Pa

## 2020-07-07 LAB
HPV HR 12 DNA SPEC QL NAA+PROBE: NEGATIVE
HPV16 AG SPEC QL: NEGATIVE
HPV18 DNA SPEC QL NAA+PROBE: NEGATIVE

## 2020-08-10 DIAGNOSIS — Z79.4 TYPE 2 DIABETES MELLITUS WITH HYPERGLYCEMIA, WITH LONG-TERM CURRENT USE OF INSULIN: Primary | ICD-10-CM

## 2020-08-10 DIAGNOSIS — E11.65 TYPE 2 DIABETES MELLITUS WITH HYPERGLYCEMIA, WITH LONG-TERM CURRENT USE OF INSULIN: Primary | ICD-10-CM

## 2020-08-10 RX ORDER — INSULIN DETEMIR 100 [IU]/ML
40 INJECTION, SOLUTION SUBCUTANEOUS DAILY
Qty: 15 ML | Refills: 3 | Status: SHIPPED | OUTPATIENT
Start: 2020-08-10 | End: 2020-12-23

## 2020-08-10 NOTE — TELEPHONE ENCOUNTER
----- Message from Sandra Rios sent at 8/10/2020 12:03 PM CDT -----  Regarding: refills  Levmair   Pharm [facility]   Pt [phone]

## 2020-08-12 DIAGNOSIS — Z79.4 TYPE 2 DIABETES MELLITUS WITH HYPERGLYCEMIA, WITH LONG-TERM CURRENT USE OF INSULIN: Primary | ICD-10-CM

## 2020-08-12 DIAGNOSIS — E11.65 TYPE 2 DIABETES MELLITUS WITH HYPERGLYCEMIA, WITH LONG-TERM CURRENT USE OF INSULIN: Primary | ICD-10-CM

## 2020-08-12 RX ORDER — LANCETS
1 EACH MISCELLANEOUS 3 TIMES DAILY
Qty: 200 EACH | Refills: 6 | Status: SHIPPED | OUTPATIENT
Start: 2020-08-12

## 2020-08-12 RX ORDER — INSULIN ASPART 100 [IU]/ML
19 INJECTION, SOLUTION INTRAVENOUS; SUBCUTANEOUS
Qty: 15 ML | Refills: 2 | Status: SHIPPED | OUTPATIENT
Start: 2020-08-12 | End: 2020-09-04 | Stop reason: CLARIF

## 2020-08-12 NOTE — TELEPHONE ENCOUNTER
----- Message from Aurelia Jose sent at 8/12/2020  9:39 AM CDT -----  Pt calling needs refills on Novalog Insulin Pens and Accu-check-clix lancets sent to [facility] on [facility]. Pt is completely out of her  meds and has not had her dose for today.   # [phone]

## 2020-08-13 DIAGNOSIS — E11.9 TYPE 2 DIABETES MELLITUS WITHOUT COMPLICATION, WITH LONG-TERM CURRENT USE OF INSULIN: ICD-10-CM

## 2020-08-13 DIAGNOSIS — E11.65 TYPE 2 DIABETES MELLITUS WITH HYPERGLYCEMIA, WITH LONG-TERM CURRENT USE OF INSULIN: Primary | ICD-10-CM

## 2020-08-13 DIAGNOSIS — Z79.4 TYPE 2 DIABETES MELLITUS WITH HYPERGLYCEMIA, WITH LONG-TERM CURRENT USE OF INSULIN: Primary | ICD-10-CM

## 2020-08-13 DIAGNOSIS — Z79.4 TYPE 2 DIABETES MELLITUS WITHOUT COMPLICATION, WITH LONG-TERM CURRENT USE OF INSULIN: ICD-10-CM

## 2020-08-13 RX ORDER — BLOOD-GLUCOSE SENSOR
EACH MISCELLANEOUS
Qty: 10 EACH | Refills: 5 | Status: SHIPPED | OUTPATIENT
Start: 2020-08-13 | End: 2020-12-23

## 2020-08-13 RX ORDER — SIMVASTATIN 10 MG/1
10 TABLET, FILM COATED ORAL NIGHTLY
Qty: 30 TABLET | Refills: 11 | Status: SHIPPED | OUTPATIENT
Start: 2020-08-13 | End: 2021-03-23 | Stop reason: SDUPTHER

## 2020-08-13 RX ORDER — BLOOD-GLUCOSE TRANSMITTER
EACH MISCELLANEOUS
Qty: 1 EACH | Refills: 3 | Status: SHIPPED | OUTPATIENT
Start: 2020-08-13 | End: 2021-03-23

## 2020-08-13 NOTE — TELEPHONE ENCOUNTER
Spoke with patient states her new rx at pharmacy for simvastatin in 5mg 2 tablets daily, wants to be sure this is correct.  Also needs refills on dexcom -DN

## 2020-08-13 NOTE — TELEPHONE ENCOUNTER
----- Message from Sandra Rios sent at 8/13/2020 10:02 AM CDT -----  Regarding: needing call back  Pt is needing a call back about her medication   Pt [phone]

## 2020-09-04 DIAGNOSIS — E11.9 NEW ONSET TYPE 2 DIABETES MELLITUS: ICD-10-CM

## 2020-09-04 RX ORDER — INSULIN LISPRO 100 [IU]/ML
19 INJECTION, SOLUTION INTRAVENOUS; SUBCUTANEOUS
Qty: 18 ML | Refills: 5 | Status: SHIPPED | OUTPATIENT
Start: 2020-09-04 | End: 2021-02-19 | Stop reason: SDUPTHER

## 2020-09-16 ENCOUNTER — OFFICE VISIT (OUTPATIENT)
Dept: FAMILY MEDICINE | Facility: CLINIC | Age: 40
End: 2020-09-16
Payer: COMMERCIAL

## 2020-09-16 VITALS
WEIGHT: 280 LBS | TEMPERATURE: 99 F | BODY MASS INDEX: 42.44 KG/M2 | DIASTOLIC BLOOD PRESSURE: 64 MMHG | HEIGHT: 68 IN | HEART RATE: 80 BPM | SYSTOLIC BLOOD PRESSURE: 96 MMHG

## 2020-09-16 DIAGNOSIS — G47.33 OSA (OBSTRUCTIVE SLEEP APNEA): ICD-10-CM

## 2020-09-16 DIAGNOSIS — Z86.718 HISTORY OF DVT (DEEP VEIN THROMBOSIS): ICD-10-CM

## 2020-09-16 DIAGNOSIS — E11.65 TYPE 2 DIABETES MELLITUS WITH HYPERGLYCEMIA, WITH LONG-TERM CURRENT USE OF INSULIN: Primary | ICD-10-CM

## 2020-09-16 DIAGNOSIS — Z23 NEEDS FLU SHOT: ICD-10-CM

## 2020-09-16 DIAGNOSIS — Z79.4 TYPE 2 DIABETES MELLITUS WITH HYPERGLYCEMIA, WITH LONG-TERM CURRENT USE OF INSULIN: Primary | ICD-10-CM

## 2020-09-16 DIAGNOSIS — Z79.01 LONG TERM (CURRENT) USE OF ANTICOAGULANTS: ICD-10-CM

## 2020-09-16 LAB — HBA1C MFR BLD: 8.1 %

## 2020-09-16 PROCEDURE — 90682 FLU VACCINE - QUADRIVALENT (RECOMBINANT) PRESERVATIVE FREE: ICD-10-PCS | Mod: S$GLB,,, | Performed by: FAMILY MEDICINE

## 2020-09-16 PROCEDURE — 90682 RIV4 VACC RECOMBINANT DNA IM: CPT | Mod: S$GLB,,, | Performed by: FAMILY MEDICINE

## 2020-09-16 PROCEDURE — 90471 FLU VACCINE - QUADRIVALENT (RECOMBINANT) PRESERVATIVE FREE: ICD-10-PCS | Mod: S$GLB,,, | Performed by: FAMILY MEDICINE

## 2020-09-16 PROCEDURE — 99214 PR OFFICE/OUTPT VISIT, EST, LEVL IV, 30-39 MIN: ICD-10-PCS | Mod: 25,S$GLB,, | Performed by: FAMILY MEDICINE

## 2020-09-16 PROCEDURE — 83036 POCT HEMOGLOBIN A1C: ICD-10-PCS | Mod: QW,59,, | Performed by: FAMILY MEDICINE

## 2020-09-16 PROCEDURE — 90471 IMMUNIZATION ADMIN: CPT | Mod: S$GLB,,, | Performed by: FAMILY MEDICINE

## 2020-09-16 PROCEDURE — 3052F HG A1C>EQUAL 8.0%<EQUAL 9.0%: CPT | Mod: S$GLB,,, | Performed by: FAMILY MEDICINE

## 2020-09-16 PROCEDURE — 99214 OFFICE O/P EST MOD 30 MIN: CPT | Mod: 25,S$GLB,, | Performed by: FAMILY MEDICINE

## 2020-09-16 PROCEDURE — 3008F BODY MASS INDEX DOCD: CPT | Mod: S$GLB,,, | Performed by: FAMILY MEDICINE

## 2020-09-16 PROCEDURE — 83036 HEMOGLOBIN GLYCOSYLATED A1C: CPT | Mod: QW,59,, | Performed by: FAMILY MEDICINE

## 2020-09-16 PROCEDURE — 3008F PR BODY MASS INDEX (BMI) DOCUMENTED: ICD-10-PCS | Mod: S$GLB,,, | Performed by: FAMILY MEDICINE

## 2020-09-16 PROCEDURE — 3052F PR MOST RECENT HEMOGLOBIN A1C LEVEL 8.0 - < 9.0%: ICD-10-PCS | Mod: S$GLB,,, | Performed by: FAMILY MEDICINE

## 2020-09-16 RX ORDER — PEN NEEDLE, DIABETIC, SAFETY 30 GX3/16"
1 NEEDLE, DISPOSABLE MISCELLANEOUS
Qty: 200 EACH | Refills: 5 | Status: SHIPPED | OUTPATIENT
Start: 2020-09-16

## 2020-09-16 NOTE — PROGRESS NOTES
SUBJECTIVE:    Patient ID: Jose Hi is a 40 y.o. female.    Chief Complaint: 3M Check UP, flu vaccine declined, and [facility] for eyes - will schedule    Reports has been active. Has loss inches. FBS around 160. Afternoon . Feels much better. No SOB. Continues on vitamin supplements  UTD with pap 6/2020 that was normal . Has returned to working full time. Works 7 days straight. Because also works a part-time job. 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   Has been trying to get a Dexcom monitor for continue BS monitoring.  Continues of Eliquis secondary to 2 episodes of DVT and PE in the pastr years  Dx with NABILA now using her CPAP. Is more rested when she awakes      Office Visit on 09/16/2020   Component Date Value Ref Range Status    Hemoglobin A1C 09/16/2020 8.1  % Final   Office Visit on 06/24/2020   Component Date Value Ref Range Status    HPV other High Risk types, PCR 06/24/2020 Negative  Negative Final    HPV High Risk type 16, PCR 06/24/2020 Negative  Negative Final    HPV High Risk type 18, PCR 06/24/2020 Negative  Negative Final       Past Medical History:   Diagnosis Date    Diabetes     History of pulmonary embolus (PE)     March 2019     Past Surgical History:   Procedure Laterality Date    FRACTURE SURGERY      RIGHT HEART CATHETERIZATION N/A 3/22/2019    Procedure: INSERTION, CATHETER, RIGHT HEART;  Surgeon: Andres Cates MD;  Location: [facility] CATH LAB;  Service: Cardiology;  Laterality: N/A;    TUBAL LIGATION       Family History   Problem Relation Age of Onset    Breast cancer Neg Hx     Colon cancer Neg Hx     Ovarian cancer Neg Hx        Marital Status: Legally   Alcohol History:  reports current alcohol use.  Tobacco History:  reports that she has never smoked. She has never used smokeless tobacco.  Drug History:  reports no history of drug use.    Review of patient's allergies indicates:   Allergen Reactions    Contrast media   "      Current Outpatient Medications:     blood sugar diagnostic Strp, Test 4 (four) times daily., Disp: 150 strip, Rfl: 0    DEXCOM G6  Misc, USE  D UTD, Disp: , Rfl: 0    DEXCOM G6 SENSOR Dory, USE 1 SENSOR EVERY 10 DAYS, Disp: 10 each, Rfl: 5    DEXCOM G6 TRANSMITTER Dory, USE 1 EVERY 3 MONTHS AS DIRECTED, Disp: 1 each, Rfl: 3    ELIQUIS 5 mg Tab, Take 1 tablet (5 mg total) by mouth 2 (two) times daily., Disp: 60 tablet, Rfl: 12    insulin detemir U-100 (LEVEMIR FLEXTOUCH U-100 INSULN) 100 unit/mL (3 mL) InPn pen, Inject 40 Units into the skin once daily., Disp: 15 mL, Rfl: 3    insulin lispro (HUMALOG KWIKPEN INSULIN) 100 unit/mL pen, Inject 19 Units into the skin 3 (three) times daily with meals., Disp: 18 mL, Rfl: 5    lancets (ACCU-CHEK MULTICLIX LANCET) Misc, 1 each by Misc.(Non-Drug; Combo Route) route 3 (three) times daily., Disp: 200 each, Rfl: 6    lisinopriL (PRINIVIL,ZESTRIL) 2.5 MG tablet, Take 1 tablet (2.5 mg total) by mouth once daily., Disp: 30 tablet, Rfl: 12    simvastatin (ZOCOR) 10 MG tablet, Take 1 tablet (10 mg total) by mouth every evening., Disp: 30 tablet, Rfl: 11    blood-glucose meter kit, Use as instructed, Disp: 1 each, Rfl: 0    pen needle,diabetic dual safty (BD AUTOSHIELD DUO PEN NEEDLE) 30 gauge x 3/16" Ndle, 1 each by Misc.(Non-Drug; Combo Route) route 5 (five) times daily., Disp: 200 each, Rfl: 5    Review of Systems   Constitutional: Negative for activity change, fatigue and unexpected weight change.   HENT: Negative for hearing loss, postnasal drip, sinus pressure, sore throat and voice change.    Eyes: Negative for photophobia and visual disturbance.   Respiratory: Negative for cough, shortness of breath and wheezing.    Cardiovascular: Negative for chest pain and palpitations.   Gastrointestinal: Negative for constipation, diarrhea and nausea.   Genitourinary: Negative for difficulty urinating, frequency, hematuria and urgency.   Musculoskeletal: Negative " "for arthralgias and back pain.   Skin: Negative for rash.   Neurological: Negative for weakness, light-headedness and headaches.   Hematological: Negative for adenopathy. Does not bruise/bleed easily.   Psychiatric/Behavioral: The patient is not nervous/anxious.           Objective:      Vitals:    09/16/20 1446   BP: 96/64   Pulse: 80   Temp: 98.7 °F (37.1 °C)   Weight: 127 kg (280 lb)   Height: 5' 8" (1.727 m)     Physical Exam  Vitals signs reviewed.   Constitutional:       General: She is not in acute distress.     Appearance: Normal appearance. She is well-developed.   HENT:      Head: Normocephalic and atraumatic.      Right Ear: External ear normal.      Left Ear: External ear normal.      Nose: Nose normal.      Mouth/Throat:      Mouth: Mucous membranes are moist.   Eyes:      General: Lids are normal.      Conjunctiva/sclera: Conjunctivae normal.      Pupils: Pupils are equal, round, and reactive to light.   Neck:      Musculoskeletal: Full passive range of motion without pain and neck supple.      Thyroid: No thyromegaly.      Vascular: No JVD.      Trachea: No tracheal deviation.   Cardiovascular:      Rate and Rhythm: Normal rate and regular rhythm.      Chest Wall: PMI is not displaced.      Pulses: Normal pulses.      Heart sounds: Normal heart sounds.   Pulmonary:      Effort: Pulmonary effort is normal.      Breath sounds: Normal breath sounds.   Abdominal:      General: Bowel sounds are normal.      Palpations: Abdomen is soft.      Tenderness: There is no abdominal tenderness. There is no guarding or rebound.   Musculoskeletal: Normal range of motion.         General: No tenderness.   Skin:     General: Skin is warm and dry.      Findings: No rash.   Neurological:      General: No focal deficit present.      Mental Status: She is alert and oriented to person, place, and time.   Psychiatric:         Mood and Affect: Mood normal.         Behavior: Behavior normal.           Assessment:       1. Type " "2 diabetes mellitus with hyperglycemia, with long-term current use of insulin    2. Needs flu shot    3. Long term (current) use of anticoagulants    4. NABILA (obstructive sleep apnea)    5. History of DVT (deep vein thrombosis)         Plan:       Type 2 diabetes mellitus with hyperglycemia, with long-term current use of insulin  Comments:  Numbers have continued to improve.  Orders:  -     Hemoglobin A1C, POCT  -     pen needle,diabetic dual safty (BD AUTOSHIELD DUO PEN NEEDLE) 30 gauge x 3/16" Ndle; 1 each by Misc.(Non-Drug; Combo Route) route 5 (five) times daily.  Dispense: 200 each; Refill: 5    Needs flu shot  -     Influenza - Quadrivalent (Recombinant) (PF)    Long term (current) use of anticoagulants    NABILA (obstructive sleep apnea)  Comments:  Doing well on CPAP    History of DVT (deep vein thrombosis)  Comments:  Continue Eliquis     Patient instructed to have eye exam with ophthalmologist.  Follow up in about 3 months (around 12/16/2020) for Diabetic Check-Up.              "

## 2020-09-23 DIAGNOSIS — Z79.4 TYPE 2 DIABETES MELLITUS WITH HYPERGLYCEMIA, WITH LONG-TERM CURRENT USE OF INSULIN: ICD-10-CM

## 2020-09-23 DIAGNOSIS — E11.65 TYPE 2 DIABETES MELLITUS WITH HYPERGLYCEMIA, WITH LONG-TERM CURRENT USE OF INSULIN: ICD-10-CM

## 2020-09-23 DIAGNOSIS — F41.8 SITUATIONAL ANXIETY: Primary | ICD-10-CM

## 2020-09-23 RX ORDER — LORAZEPAM 0.5 MG/1
0.5 TABLET ORAL EVERY 6 HOURS PRN
Qty: 90 TABLET | Refills: 1 | Status: SHIPPED | OUTPATIENT
Start: 2020-09-23 | End: 2020-12-23

## 2020-09-23 RX ORDER — INSULIN PUMP SYRINGE, 3 ML
EACH MISCELLANEOUS
Qty: 1 EACH | Refills: 0 | Status: SHIPPED | OUTPATIENT
Start: 2020-09-23 | End: 2021-03-23 | Stop reason: SDUPTHER

## 2020-09-25 DIAGNOSIS — Z79.4 TYPE 2 DIABETES MELLITUS WITH HYPERGLYCEMIA, WITH LONG-TERM CURRENT USE OF INSULIN: Primary | ICD-10-CM

## 2020-09-25 DIAGNOSIS — E11.65 TYPE 2 DIABETES MELLITUS WITH HYPERGLYCEMIA, WITH LONG-TERM CURRENT USE OF INSULIN: Primary | ICD-10-CM

## 2020-09-25 RX ORDER — BLOOD SUGAR DIAGNOSTIC
STRIP MISCELLANEOUS
Qty: 200 EACH | Refills: 5 | Status: SHIPPED | OUTPATIENT
Start: 2020-09-25 | End: 2021-03-23

## 2020-09-25 RX ORDER — DEXTROSE 4 G
TABLET,CHEWABLE ORAL
Qty: 1 EACH | Refills: 0 | Status: SHIPPED | OUTPATIENT
Start: 2020-09-25 | End: 2021-03-23

## 2020-09-25 RX ORDER — LANCETS
EACH MISCELLANEOUS
Qty: 200 EACH | Refills: 5 | Status: SHIPPED | OUTPATIENT
Start: 2020-09-25

## 2020-09-25 NOTE — TELEPHONE ENCOUNTER
The patient's prescription has been approved and sent to   [id number] [place] - [address] AT [facility] [address]  [address]  [place] [id number]  Phone: [phone] Fax: [phone]

## 2020-09-25 NOTE — TELEPHONE ENCOUNTER
----- Message from Sandra Rios sent at 9/25/2020 11:23 AM CDT -----  Regarding: new rx  Pharm is saying that pt is needing new testing supplies   Accu check Guide test scripts and fast click lancets     Pharm [facility]   Pharm tech [phone]

## 2020-10-12 ENCOUNTER — TELEPHONE (OUTPATIENT)
Dept: FAMILY MEDICINE | Facility: CLINIC | Age: 40
End: 2020-10-12

## 2020-10-12 NOTE — TELEPHONE ENCOUNTER
----- Message from Sandra Rios sent at 10/12/2020 10:56 AM CDT -----  Regarding: needing call back  Pt is saying that her insurance doesn't cover levemir needing another rx   [phone]

## 2020-10-20 NOTE — TELEPHONE ENCOUNTER
Dom - please call this patient and make sure he finally got the insulin and see how his blood dugar is doing.

## 2020-10-22 NOTE — TELEPHONE ENCOUNTER
Spoke with pt, still does not have levemir.  Per dr lidia harris samples given. Pt states she will  from office tomorrow -DN

## 2020-12-15 ENCOUNTER — HOSPITAL ENCOUNTER (EMERGENCY)
Facility: HOSPITAL | Age: 40
Discharge: HOME | End: 2020-12-15
Attending: EMERGENCY MEDICINE
Payer: COMMERCIAL

## 2020-12-15 ENCOUNTER — TELEPHONE (OUTPATIENT)
Dept: PULMONOLOGY | Facility: CLINIC | Age: 40
End: 2020-12-15

## 2020-12-15 ENCOUNTER — PATIENT MESSAGE (OUTPATIENT)
Dept: PULMONOLOGY | Facility: CLINIC | Age: 40
End: 2020-12-15

## 2020-12-15 VITALS
BODY MASS INDEX: 42.44 KG/M2 | RESPIRATION RATE: 18 BRPM | TEMPERATURE: 99 F | HEIGHT: 68 IN | SYSTOLIC BLOOD PRESSURE: 110 MMHG | OXYGEN SATURATION: 100 % | HEART RATE: 78 BPM | WEIGHT: 280 LBS | DIASTOLIC BLOOD PRESSURE: 57 MMHG

## 2020-12-15 DIAGNOSIS — M79.662 PAIN OF LEFT CALF: Primary | ICD-10-CM

## 2020-12-15 LAB
ALBUMIN SERPL BCP-MCNC: 3.7 G/DL (ref 3.5–5.2)
ALP SERPL-CCNC: 51 U/L (ref 55–135)
ALT SERPL W/O P-5'-P-CCNC: 17 U/L (ref 10–44)
ANION GAP SERPL CALC-SCNC: 6 MMOL/L (ref 8–16)
APTT BLDCRRT: 25.8 SEC (ref 21–32)
AST SERPL-CCNC: 24 U/L (ref 10–40)
B-HCG UR QL: NEGATIVE
BACTERIA #/AREA URNS AUTO: ABNORMAL /HPF
BASOPHILS # BLD AUTO: 0.03 K/UL (ref 0–0.2)
BASOPHILS NFR BLD: 0.6 % (ref 0–1.9)
BILIRUB SERPL-MCNC: 0.2 MG/DL (ref 0.1–1)
BILIRUB UR QL STRIP: NEGATIVE
BUN SERPL-MCNC: 10 MG/DL (ref 6–20)
CALCIUM SERPL-MCNC: 9.7 MG/DL (ref 8.7–10.5)
CHLORIDE SERPL-SCNC: 104 MMOL/L (ref 95–110)
CLARITY UR REFRACT.AUTO: ABNORMAL
CO2 SERPL-SCNC: 26 MMOL/L (ref 23–29)
COLOR UR AUTO: YELLOW
CREAT SERPL-MCNC: 1 MG/DL (ref 0.5–1.4)
CTP QC/QA: YES
CTP QC/QA: YES
DIFFERENTIAL METHOD: ABNORMAL
EOSINOPHIL # BLD AUTO: 0.1 K/UL (ref 0–0.5)
EOSINOPHIL NFR BLD: 1.9 % (ref 0–8)
ERYTHROCYTE [DISTWIDTH] IN BLOOD BY AUTOMATED COUNT: 14.3 % (ref 11.5–14.5)
EST. GFR  (AFRICAN AMERICAN): >60 ML/MIN/1.73 M^2
EST. GFR  (NON AFRICAN AMERICAN): >60 ML/MIN/1.73 M^2
GLUCOSE SERPL-MCNC: 182 MG/DL (ref 70–110)
GLUCOSE UR QL STRIP: NEGATIVE
HCT VFR BLD AUTO: 41.5 % (ref 37–48.5)
HGB BLD-MCNC: 12.5 G/DL (ref 12–16)
HGB UR QL STRIP: ABNORMAL
IMM GRANULOCYTES # BLD AUTO: 0.02 K/UL (ref 0–0.04)
IMM GRANULOCYTES NFR BLD AUTO: 0.4 % (ref 0–0.5)
INR PPP: 0.9 (ref 0.8–1.2)
KETONES UR QL STRIP: NEGATIVE
LEUKOCYTE ESTERASE UR QL STRIP: NEGATIVE
LYMPHOCYTES # BLD AUTO: 1.8 K/UL (ref 1–4.8)
LYMPHOCYTES NFR BLD: 34.5 % (ref 18–48)
MCH RBC QN AUTO: 26.1 PG (ref 27–31)
MCHC RBC AUTO-ENTMCNC: 30.1 G/DL (ref 32–36)
MCV RBC AUTO: 87 FL (ref 82–98)
MICROSCOPIC COMMENT: ABNORMAL
MONOCYTES # BLD AUTO: 0.5 K/UL (ref 0.3–1)
MONOCYTES NFR BLD: 8.7 % (ref 4–15)
NEUTROPHILS # BLD AUTO: 2.8 K/UL (ref 1.8–7.7)
NEUTROPHILS NFR BLD: 53.9 % (ref 38–73)
NITRITE UR QL STRIP: NEGATIVE
NRBC BLD-RTO: 0 /100 WBC
PH UR STRIP: 5 [PH] (ref 5–8)
PLATELET # BLD AUTO: 274 K/UL (ref 150–350)
PMV BLD AUTO: 10 FL (ref 9.2–12.9)
POTASSIUM SERPL-SCNC: 4.1 MMOL/L (ref 3.5–5.1)
PROT SERPL-MCNC: 8 G/DL (ref 6–8.4)
PROT UR QL STRIP: NEGATIVE
PROTHROMBIN TIME: 10.3 SEC (ref 9–12.5)
RBC # BLD AUTO: 4.79 M/UL (ref 4–5.4)
RBC #/AREA URNS AUTO: 66 /HPF (ref 0–4)
SARS-COV-2 RDRP RESP QL NAA+PROBE: NEGATIVE
SODIUM SERPL-SCNC: 136 MMOL/L (ref 136–145)
SP GR UR STRIP: 1.02 (ref 1–1.03)
SQUAMOUS #/AREA URNS AUTO: 1 /HPF
URN SPEC COLLECT METH UR: ABNORMAL
WBC # BLD AUTO: 5.27 K/UL (ref 3.9–12.7)
WBC #/AREA URNS AUTO: 2 /HPF (ref 0–5)

## 2020-12-15 PROCEDURE — 93010 EKG 12-LEAD: ICD-10-PCS | Mod: ,,, | Performed by: INTERNAL MEDICINE

## 2020-12-15 PROCEDURE — 93010 ELECTROCARDIOGRAM REPORT: CPT | Mod: ,,, | Performed by: INTERNAL MEDICINE

## 2020-12-15 PROCEDURE — 99285 EMERGENCY DEPT VISIT HI MDM: CPT | Mod: 25

## 2020-12-15 PROCEDURE — 99284 EMERGENCY DEPT VISIT MOD MDM: CPT | Mod: CS,,, | Performed by: EMERGENCY MEDICINE

## 2020-12-15 PROCEDURE — 85610 PROTHROMBIN TIME: CPT

## 2020-12-15 PROCEDURE — 99284 PR EMERGENCY DEPT VISIT,LEVEL IV: ICD-10-PCS | Mod: CS,,, | Performed by: EMERGENCY MEDICINE

## 2020-12-15 PROCEDURE — U0002 COVID-19 LAB TEST NON-CDC: HCPCS | Performed by: EMERGENCY MEDICINE

## 2020-12-15 PROCEDURE — 93005 ELECTROCARDIOGRAM TRACING: CPT

## 2020-12-15 PROCEDURE — 85025 COMPLETE CBC W/AUTO DIFF WBC: CPT

## 2020-12-15 PROCEDURE — 81025 URINE PREGNANCY TEST: CPT | Performed by: EMERGENCY MEDICINE

## 2020-12-15 PROCEDURE — 81001 URINALYSIS AUTO W/SCOPE: CPT

## 2020-12-15 PROCEDURE — 80053 COMPREHEN METABOLIC PANEL: CPT

## 2020-12-15 PROCEDURE — 85730 THROMBOPLASTIN TIME PARTIAL: CPT

## 2020-12-15 RX ORDER — ACETAMINOPHEN 500 MG
500 TABLET ORAL EVERY 6 HOURS PRN
Qty: 20 TABLET | Refills: 0 | Status: SHIPPED | OUTPATIENT
Start: 2020-12-15

## 2020-12-15 NOTE — TELEPHONE ENCOUNTER
I returned patient's call, patient stated that she  Has pain in left leg that started on 12/11/2020, pain is moving uo leg and she has a hard knot on leg.  Patient stated that she had a blood clot in right leg and in lungs in the past.  I will speak with Mrs. Arangoytheris Lopezjavan.

## 2020-12-15 NOTE — TELEPHONE ENCOUNTER
----- Message from Cathy Beltre MA sent at 12/15/2020  8:55 AM CST -----  Contact: @ [phone]    ----- Message -----  From: Jules Crandall  Sent: 12/15/2020   8:40 AM CST  To: Angelia Conley Staff    Patient requesting a return call about pains she's experiencing with the Blood Clots, pls call

## 2020-12-15 NOTE — ED PROVIDER NOTES
Encounter Date: 12/15/2020    SCRIBE #1 NOTE: I, Chirag Barraza, am scribing for, and in the presence of,  Janes Parrish MD. I have scribed the following portions of the note - Other sections scribed: HPI ROS PE.       History     Chief Complaint   Patient presents with    Leg Pain     both leg pain and swelling, hx blood clots , on eliquis,      Context: Pateint has a history of DM type II, NABILA, DVT/PE due to clotting disorder and is currently taking Eliquis. The pain first appeared lower in the left leg towards the ankle, but it has since moved to her calf. She states that her symptoms are similar to prior clots.   Onset: 4 days ago  Location: Left lower leg  Duration: constant  Quality: pressure-like; states that she feels a hardness that has moved up the back of her left calf   Associated Symptoms: Endorses left leg swelling. Denies chest pain or shortness of breath, numbness, right leg pain or swelling.     The history is provided by the patient and medical records. No  was used.          Review of patient's allergies indicates:   Allergen Reactions    Contrast media      Past Medical History:   Diagnosis Date    Diabetes     History of pulmonary embolus (PE)     March 2019     Past Surgical History:   Procedure Laterality Date    FRACTURE SURGERY      RIGHT HEART CATHETERIZATION N/A 3/22/2019    Procedure: INSERTION, CATHETER, RIGHT HEART;  Surgeon: Andres Cates MD;  Location: [facility];  Service: Cardiology;  Laterality: N/A;    TUBAL LIGATION       Family History   Problem Relation Age of Onset    Breast cancer Neg Hx     Colon cancer Neg Hx     Ovarian cancer Neg Hx      Social History     Tobacco Use    Smoking status: Never Smoker    Smokeless tobacco: Never Used   Substance Use Topics    Alcohol use: Yes     Frequency: Monthly or less     Drinks per session: 3 or 4     Binge frequency: Never     Comment: socially    Drug use: No     Review of Systems    Constitutional: Negative for fever.   HENT: Negative for facial swelling.    Eyes: Negative for redness.   Respiratory: Negative for shortness of breath.    Cardiovascular: Positive for leg swelling. Negative for chest pain.        Positive for leg pain.   Gastrointestinal: Negative for vomiting.   Skin: Negative for color change.   Allergic/Immunologic: Negative for food allergies.   Neurological: Negative for numbness.   Hematological: Does not bruise/bleed easily.       Physical Exam     Initial Vitals [12/15/20 1641]   BP Pulse Resp Temp SpO2   108/63 85 18 98.8 °F (37.1 °C) 100 %      MAP       --         Physical Exam    Nursing note and vitals reviewed.  Constitutional: She is not diaphoretic. No distress.   HENT:   Head: Normocephalic and atraumatic.   Eyes: Right eye exhibits no discharge. Left eye exhibits no discharge.   Neck: Neck supple. No tracheal deviation present.   Cardiovascular: Normal rate, regular rhythm and normal heart sounds.   Pulmonary/Chest: Breath sounds normal. No respiratory distress.   Abdominal: Soft. Bowel sounds are normal. There is no abdominal tenderness.   Musculoskeletal: No edema.      Comments: There is left calf tenderness. All compartments are soft. 2+ DP. No knee effusion.   No appreciable left leg edema    Neurological: She is alert and oriented to person, place, and time.   Skin: Skin is warm. No rash noted.   Psychiatric: She has a normal mood and affect. Thought content normal.         ED Course   Procedures  Labs Reviewed   CBC W/ AUTO DIFFERENTIAL - Abnormal; Notable for the following components:       Result Value    MCH 26.1 (*)     MCHC 30.1 (*)     All other components within normal limits   COMPREHENSIVE METABOLIC PANEL - Abnormal; Notable for the following components:    Glucose 182 (*)     Alkaline Phosphatase 51 (*)     Anion Gap 6 (*)     All other components within normal limits   PROTIME-INR   APTT   URINALYSIS, REFLEX TO URINE CULTURE   POCT URINE  PREGNANCY   SARS-COV-2 RDRP GENE    Narrative:     This test utilizes isothermal nucleic acid amplification   technology to detect the SARS-CoV-2 RdRp nucleic acid segment.   The analytical sensitivity (limit of detection) is 125 genome   equivalents/mL.   A POSITIVE result implies infection with the SARS-CoV-2 virus;   the patient is presumed to be contagious.     A NEGATIVE result means that SARS-CoV-2 nucleic acids are not   present above the limit of detection. A NEGATIVE result should be   treated as presumptive. It does not rule out the possibility of   COVID-19 and should not be the sole basis for treatment decisions.   If COVID-19 is strongly suspected based on clinical and exposure   history, re-testing using an alternate molecular assay should be   considered.   This test is only for use under the Food and Drug   Administration s Emergency Use Authorization (EUA).   Commercial kits are provided by [employer].   Performance characteristics of the EUA have been independently   verified by [facility] Department of   Pathology and Laboratory Medicine.   _________________________________________________________________   The authorized Fact Sheet for Healthcare Providers and the authorized Fact   Sheet for Patients of the ID NOW COVID-19 are available on the FDA   website:     https://www.fda.gov/media/914965/download  https://www.fda.gov/media/748223/download           EKG Readings: (Independently Interpreted)   Initial Reading: No STEMI. Rhythm: Normal Sinus Rhythm. Heart Rate: 61. Ectopy: No Ectopy. Conduction: Normal.       Imaging Results          US Lower Extremity Veins Bilateral (Final result)  Result time 12/15/20 21:22:52    Final result by Amor Wynn MD (12/15/20 21:22:52)                 Impression:      No evidence of deep venous thrombosis in either lower extremity.  Interval resolution of right popliteal and right posterior tibial vein thrombus.    Electronically  signed by resident: Chet Villalobos  Date:    12/15/2020  Time:    21:06    Electronically signed by: Amor Wynn MD  Date:    12/15/2020  Time:    21:22             Narrative:    EXAMINATION:  US LOWER EXTREMITY VEINS BILATERAL    CLINICAL HISTORY:  Pain in leg, unspecified    TECHNIQUE:  Duplex and color flow Doppler and dynamic compression was performed of the bilateral lower extremity veins was performed.    COMPARISON:  Ultrasound 03/06/2020, 09/12/2019    FINDINGS:  Right thigh veins: The common femoral, femoral, popliteal, upper greater saphenous, and deep femoral veins are patent and free of thrombus. The veins are normally compressible and have normal phasic flow and augmentation response.    Right calf veins: The visualized calf veins are patent.    Left thigh veins: The common femoral, femoral, popliteal, upper greater saphenous, and deep femoral veins are patent and free of thrombus. The veins are normally compressible and have normal phasic flow and augmentation response.    Left calf veins: The visualized calf veins are patent.    Miscellaneous: None                               X-Ray Chest PA And Lateral (Final result)  Result time 12/15/20 19:53:39    Final result by Ras Márquez MD (12/15/20 19:53:39)                 Impression:      No detrimental change or radiographic acute intrathoracic process seen.      Electronically signed by: Rsa Márquez MD  Date:    12/15/2020  Time:    19:53             Narrative:    EXAMINATION:  XR CHEST PA AND LATERAL    CLINICAL HISTORY:  Pain in leg, unspecified    TECHNIQUE:  PA and lateral views of the chest were performed.    COMPARISON:  Chest radiograph 03/22/2019 and CT thorax 03/06/2020    FINDINGS:  No detrimental change.The lungs are well expanded without focal consolidation, pleural effusion or pneumothorax.  The known subcentimeter nodule within the left upper lobe is not well demonstrated by conventional radiography.  No pleural effusion or  pneumothorax.  Pulmonary vasculature and hilar contours are within normal limits.    The cardiac silhouette is normal in size. The hilar and mediastinal contours are unremarkable.    Bones are intact.                                 Medical Decision Making:   History:   Old Medical Records: I decided to obtain old medical records.  Old Records Summarized: records from previous admission(s).  Initial Assessment:   41 yo female with a h/o DVT/PE compliant with Eliquis p/w atraumatic left calf pain.  Plan for labs, DVT US.   No chest pain/SOB/hypoxia/tachycardia to suggest PE at this time.   Differential Diagnosis:   Including, but not limited to:  DVT, muscle strain, ruptured Baker's cyst, cellulitis   Independently Interpreted Test(s):   I have ordered and independently interpreted EKG Reading(s) - see prior notes  Clinical Tests:   Lab Tests: Ordered and Reviewed  Radiological Study: Ordered and Reviewed  Medical Tests: Ordered and Reviewed  ED Management:  US negative for acute DVT.   Also stands for long periods of time at work and may have muscle pain.   Advised she call her PCP tomorrow to schedule a f/u US in one week.  Ok to continue OTC pain medication.  All questions answered prior to d/c.  Strict return precautions given.             Scribe Attestation:   Scribe #1: I performed the above scribed service and the documentation accurately describes the services I performed. I attest to the accuracy of the note.    Attending Attestation:           Physician Attestation for Scribe:  Physician Attestation Statement for Scribe #1: I, Janes Parrish , reviewed documentation, as scribed by Chirag Barraza  in my presence, and it is both accurate and complete.                           Clinical Impression:     ICD-10-CM ICD-9-CM   1. Pain of left calf  M79.662 729.5                          ED Disposition Condition    Discharge Stable        ED Prescriptions     Medication Sig Dispense Start Date End Date Auth. Provider     acetaminophen (TYLENOL) 500 MG tablet Take 1 tablet (500 mg total) by mouth every 6 (six) hours as needed. 20 tablet 12/15/2020  Janes Parrish MD        Follow-up Information     Follow up With Specialties Details Why Contact Info    Roel Hi MD Family Medicine In 1 week Please have repeat ultrasound in one week [address]  [address]  [facility] [address]  [phone]      [facility] Emergency Medicine  As needed, If symptoms worsen [facility] [facility] [address] [phone]                                       Janes Parrish MD  12/15/20 2627

## 2020-12-15 NOTE — TELEPHONE ENCOUNTER
----- Message from Cathy Beltre MA sent at 12/15/2020  1:33 PM CST -----    ----- Message -----  From: Inge Mello, Patient Care Assistant  Sent: 12/15/2020   1:28 PM CST  To: Angelia Conley Staff    Name of Who is Calling: FANNIE FRANKEL [[id number]]    What is the request in detail: Requesting a call back in regards of being seen for blood clots in leg. Please contact to further discuss and advise      Can the clinic reply by [facility]: No    [facility]   [phone]

## 2020-12-15 NOTE — Clinical Note
"Jose Sparks" Kolton was seen and treated in our emergency department on 12/15/2020.  She may return to work on 12/18/2020.       If you have any questions or concerns, please don't hesitate to call.       LPN    "

## 2020-12-15 NOTE — ED NOTES
The patient c/o lower left leg pain that began on Friday and felt like a pulled muscle. On Eliquis. Hx of blood disorder, has also had blood clots in her lungs

## 2020-12-15 NOTE — TELEPHONE ENCOUNTER
I called patient informed her that I spoke with          Mrs. Yael Galan in reference to pain and lump in her left leg and that Mrs. Galan wants her to go to the Emergency room now.  Patient stated that she is leaving now to go to the Emergency room,.

## 2020-12-15 NOTE — ED NOTES
LOC: The patient is awake, alert and aware of environment with an appropriate affect, the patient is oriented x 3 and speaking appropriately.  APPEARANCE: Patient resting comfortably and in no acute distress, patient is clean and well groomed.  SKIN: The skin is warm and dry, color consistent with ethnicity  MUSKULOSKELETAL: Patient c/o left lower leg pain  RESPIRATORY: Airway is open and patent, respirations are spontaneous, patient has a normal effort and rate, no accessory muscle use noted.   CARDIAC: Patient has a normal rate   NEUROLOGIC: Eyes open spontaneously, behavior appropriate to situation, follows commands

## 2020-12-23 ENCOUNTER — OFFICE VISIT (OUTPATIENT)
Dept: FAMILY MEDICINE | Facility: CLINIC | Age: 40
End: 2020-12-23
Payer: COMMERCIAL

## 2020-12-23 VITALS
DIASTOLIC BLOOD PRESSURE: 76 MMHG | BODY MASS INDEX: 41.07 KG/M2 | HEART RATE: 60 BPM | WEIGHT: 271 LBS | SYSTOLIC BLOOD PRESSURE: 114 MMHG | HEIGHT: 68 IN

## 2020-12-23 DIAGNOSIS — Z79.01 LONG TERM (CURRENT) USE OF ANTICOAGULANTS: ICD-10-CM

## 2020-12-23 DIAGNOSIS — Z86.718 HISTORY OF DVT (DEEP VEIN THROMBOSIS): ICD-10-CM

## 2020-12-23 DIAGNOSIS — M79.605 LEFT LEG PAIN: ICD-10-CM

## 2020-12-23 DIAGNOSIS — E11.65 TYPE 2 DIABETES MELLITUS WITH HYPERGLYCEMIA, WITH LONG-TERM CURRENT USE OF INSULIN: Primary | ICD-10-CM

## 2020-12-23 DIAGNOSIS — Z79.4 TYPE 2 DIABETES MELLITUS WITH HYPERGLYCEMIA, WITH LONG-TERM CURRENT USE OF INSULIN: Primary | ICD-10-CM

## 2020-12-23 LAB — HBA1C MFR BLD: 8.3 %

## 2020-12-23 PROCEDURE — 83036 HEMOGLOBIN GLYCOSYLATED A1C: CPT | Mod: QW,,, | Performed by: FAMILY MEDICINE

## 2020-12-23 PROCEDURE — 3008F PR BODY MASS INDEX (BMI) DOCUMENTED: ICD-10-PCS | Mod: S$GLB,,, | Performed by: FAMILY MEDICINE

## 2020-12-23 PROCEDURE — 83036 POCT HEMOGLOBIN A1C: ICD-10-PCS | Mod: QW,,, | Performed by: FAMILY MEDICINE

## 2020-12-23 PROCEDURE — 3052F HG A1C>EQUAL 8.0%<EQUAL 9.0%: CPT | Mod: S$GLB,,, | Performed by: FAMILY MEDICINE

## 2020-12-23 PROCEDURE — 99214 OFFICE O/P EST MOD 30 MIN: CPT | Mod: S$GLB,,, | Performed by: FAMILY MEDICINE

## 2020-12-23 PROCEDURE — 99214 PR OFFICE/OUTPT VISIT, EST, LEVL IV, 30-39 MIN: ICD-10-PCS | Mod: S$GLB,,, | Performed by: FAMILY MEDICINE

## 2020-12-23 PROCEDURE — 3052F PR MOST RECENT HEMOGLOBIN A1C LEVEL 8.0 - < 9.0%: ICD-10-PCS | Mod: S$GLB,,, | Performed by: FAMILY MEDICINE

## 2020-12-23 PROCEDURE — 3008F BODY MASS INDEX DOCD: CPT | Mod: S$GLB,,, | Performed by: FAMILY MEDICINE

## 2020-12-23 RX ORDER — BLOOD-GLUCOSE SENSOR
EACH MISCELLANEOUS
Qty: 10 EACH | Refills: 5 | Status: SHIPPED | OUTPATIENT
Start: 2020-12-23 | End: 2021-03-23

## 2020-12-23 RX ORDER — INSULIN GLARGINE 100 [IU]/ML
24 INJECTION, SOLUTION SUBCUTANEOUS EVERY 12 HOURS
Qty: 15 ML | Refills: 5 | Status: SHIPPED | OUTPATIENT
Start: 2020-12-23 | End: 2021-02-19 | Stop reason: SDUPTHER

## 2020-12-23 NOTE — PROGRESS NOTES
SUBJECTIVE:    Patient ID: Jose Hi is a 40 y.o. female.    Chief Complaint: Diabetes (no bottles, Eye exam pt will sched// SW)    Patient past medical history of type 2 diabetes and high coagulation history of DVT and pulmonary embolism presents for visit.  She remains on Eliquis therapy.  She was seen in the emergency room over the weekend pain swelling a that she reports way her leg today she had a DVT.   evaluation was negative for DVT.Patient reports leg pain and SOB still.  On insulin 4 times a day for diabetes and uses her values to adjust her sliding scale with meals.   A1c slightly increased since her last visit. Mild weight loss.  Patient still having some issues managing her nighttime and lunchtime blood sugars.  States does not feel well as blood sugars higher than 170.  No hypoglycemic episodes.      Office Visit on 12/23/2020   Component Date Value Ref Range Status    Hemoglobin A1C 12/23/2020 8.3  % Final   Admission on 12/15/2020, Discharged on 12/15/2020   Component Date Value Ref Range Status    WBC 12/15/2020 5.27  3.90 - 12.70 K/uL Final    RBC 12/15/2020 4.79  4.00 - 5.40 M/uL Final    Hemoglobin 12/15/2020 12.5  12.0 - 16.0 g/dL Final    Hematocrit 12/15/2020 41.5  37.0 - 48.5 % Final    MCV 12/15/2020 87  82 - 98 fL Final    MCH 12/15/2020 26.1* 27.0 - 31.0 pg Final    MCHC 12/15/2020 30.1* 32.0 - 36.0 g/dL Final    RDW 12/15/2020 14.3  11.5 - 14.5 % Final    Platelets 12/15/2020 274  150 - 350 K/uL Final    MPV 12/15/2020 10.0  9.2 - 12.9 fL Final    Immature Granulocytes 12/15/2020 0.4  0.0 - 0.5 % Final    Gran # (ANC) 12/15/2020 2.8  1.8 - 7.7 K/uL Final    Immature Grans (Abs) 12/15/2020 0.02  0.00 - 0.04 K/uL Final    Lymph # 12/15/2020 1.8  1.0 - 4.8 K/uL Final    Mono # 12/15/2020 0.5  0.3 - 1.0 K/uL Final    Eos # 12/15/2020 0.1  0.0 - 0.5 K/uL Final    Baso # 12/15/2020 0.03  0.00 - 0.20 K/uL Final    nRBC 12/15/2020 0  0 /100 WBC Final    Gran %  12/15/2020 53.9  38.0 - 73.0 % Final    Lymph % 12/15/2020 34.5  18.0 - 48.0 % Final    Mono % 12/15/2020 8.7  4.0 - 15.0 % Final    Eosinophil % 12/15/2020 1.9  0.0 - 8.0 % Final    Basophil % 12/15/2020 0.6  0.0 - 1.9 % Final    Differential Method 12/15/2020 Automated   Final    Sodium 12/15/2020 136  136 - 145 mmol/L Final    Potassium 12/15/2020 4.1  3.5 - 5.1 mmol/L Final    Chloride 12/15/2020 104  95 - 110 mmol/L Final    CO2 12/15/2020 26  23 - 29 mmol/L Final    Glucose 12/15/2020 182* 70 - 110 mg/dL Final    BUN 12/15/2020 10  6 - 20 mg/dL Final    Creatinine 12/15/2020 1.0  0.5 - 1.4 mg/dL Final    Calcium 12/15/2020 9.7  8.7 - 10.5 mg/dL Final    Total Protein 12/15/2020 8.0  6.0 - 8.4 g/dL Final    Albumin 12/15/2020 3.7  3.5 - 5.2 g/dL Final    Total Bilirubin 12/15/2020 0.2  0.1 - 1.0 mg/dL Final    Alkaline Phosphatase 12/15/2020 51* 55 - 135 U/L Final    AST 12/15/2020 24  10 - 40 U/L Final    ALT 12/15/2020 17  10 - 44 U/L Final    Anion Gap 12/15/2020 6* 8 - 16 mmol/L Final    eGFR if African American 12/15/2020 >60.0  >60 mL/min/1.73 m^2 Final    eGFR if non African American 12/15/2020 >60.0  >60 mL/min/1.73 m^2 Final    POC Preg Test, Ur 12/15/2020 Negative  Negative Final     Acceptable 12/15/2020 Yes   Final    POC Rapid COVID 12/15/2020 Negative  Negative Final     Acceptable 12/15/2020 Yes   Final    Prothrombin Time 12/15/2020 10.3  9.0 - 12.5 sec Final    INR 12/15/2020 0.9  0.8 - 1.2 Final    aPTT 12/15/2020 25.8  21.0 - 32.0 sec Final    Specimen UA 12/15/2020 Urine, Clean Catch   Final    Color, UA 12/15/2020 Yellow  Yellow, Straw, Adela Final    Appearance, UA 12/15/2020 Hazy* Clear Final    pH, UA 12/15/2020 5.0  5.0 - 8.0 Final    Specific Gravity, UA 12/15/2020 1.025  1.005 - 1.030 Final    Protein, UA 12/15/2020 Negative  Negative Final    Glucose, UA 12/15/2020 Negative  Negative Final    Ketones, UA 12/15/2020  Negative  Negative Final    Bilirubin (UA) 12/15/2020 Negative  Negative Final    Occult Blood UA 12/15/2020 3+* Negative Final    Nitrite, UA 12/15/2020 Negative  Negative Final    Leukocytes, UA 12/15/2020 Negative  Negative Final    RBC, UA 12/15/2020 66* 0 - 4 /hpf Final    WBC, UA 12/15/2020 2  0 - 5 /hpf Final    Bacteria 12/15/2020 Many* None-Occ /hpf Final    Squam Epithel, UA 12/15/2020 1  /hpf Final    Microscopic Comment 12/15/2020 SEE COMMENT   Final   Office Visit on 09/16/2020   Component Date Value Ref Range Status    Hemoglobin A1C 09/16/2020 8.1  % Final   Office Visit on 06/24/2020   Component Date Value Ref Range Status    HPV other High Risk types, PCR 06/24/2020 Negative  Negative Final    HPV High Risk type 16, PCR 06/24/2020 Negative  Negative Final    HPV High Risk type 18, PCR 06/24/2020 Negative  Negative Final     Past Medical History:   Diagnosis Date    Diabetes     History of pulmonary embolus (PE)     March 2019     Past Surgical History:   Procedure Laterality Date    FRACTURE SURGERY      RIGHT HEART CATHETERIZATION N/A 3/22/2019    Procedure: INSERTION, CATHETER, RIGHT HEART;  Surgeon: Andres Cates MD;  Location: [facility] CATH LAB;  Service: Cardiology;  Laterality: N/A;    TUBAL LIGATION       Family History   Problem Relation Age of Onset    Breast cancer Neg Hx     Colon cancer Neg Hx     Ovarian cancer Neg Hx        Marital Status: Legally   Alcohol History:  reports current alcohol use.  Tobacco History:  reports that she has never smoked. She has never used smokeless tobacco.  Drug History:  reports no history of drug use.    Review of patient's allergies indicates:   Allergen Reactions    Contrast media        Current Outpatient Medications:     acetaminophen (TYLENOL) 500 MG tablet, Take 1 tablet (500 mg total) by mouth every 6 (six) hours as needed., Disp: 20 tablet, Rfl: 0    blood sugar diagnostic (ACCU-CHEK GUIDE TEST STRIPS) Strp,  "Test blood sugar TID, Disp: 200 each, Rfl: 5    blood sugar diagnostic Strp, Test 4 (four) times daily., Disp: 150 strip, Rfl: 0    blood-glucose meter (ACCU-CHEK GUIDE GLUCOSE METER) Misc, Test blood sugar TID, Disp: 1 each, Rfl: 0    blood-glucose meter kit, Use as instructed, Disp: 1 each, Rfl: 0    DEXCOM G6  Misc, USE  D UTD, Disp: , Rfl: 0    DEXCOM G6 SENSOR Dory, USE 1 SENSOR EVERY 10 DAYS, Disp: 10 each, Rfl: 5    DEXCOM G6 TRANSMITTER Dory, USE 1 EVERY 3 MONTHS AS DIRECTED, Disp: 1 each, Rfl: 3    ELIQUIS 5 mg Tab, Take 1 tablet (5 mg total) by mouth 2 (two) times daily., Disp: 60 tablet, Rfl: 12    insulin (BASAGLAR KWIKPEN U-100 INSULIN) glargine 100 units/mL (3mL) SubQ pen, Inject 24 Units into the skin every 12 (twelve) hours., Disp: 15 mL, Rfl: 5    insulin lispro (HUMALOG KWIKPEN INSULIN) 100 unit/mL pen, Inject 19 Units into the skin 3 (three) times daily with meals., Disp: 18 mL, Rfl: 5    lancets (ACCU-CHEK MULTICLIX LANCET) Misc, 1 each by Misc.(Non-Drug; Combo Route) route 3 (three) times daily., Disp: 200 each, Rfl: 6    lancets (ACCU-CHEK SOFTCLIX LANCETS) Misc, Test blood sugar TID, Disp: 200 each, Rfl: 5    lisinopriL (PRINIVIL,ZESTRIL) 2.5 MG tablet, Take 1 tablet (2.5 mg total) by mouth once daily., Disp: 30 tablet, Rfl: 12    pen needle,diabetic dual safty (BD AUTOSHIELD DUO PEN NEEDLE) 30 gauge x 3/16" Ndle, 1 each by Misc.(Non-Drug; Combo Route) route 5 (five) times daily., Disp: 200 each, Rfl: 5    simvastatin (ZOCOR) 10 MG tablet, Take 1 tablet (10 mg total) by mouth every evening., Disp: 30 tablet, Rfl: 11    Review of Systems       Objective:      Vitals:    12/23/20 1345   BP: 114/76   Pulse: 60   Weight: 122.9 kg (271 lb)   Height: 5' 8" (1.727 m)     Physical Exam  Vitals signs reviewed.   Constitutional:       General: She is not in acute distress.     Appearance: Normal appearance. She is well-developed.   HENT:      Head: Normocephalic and atraumatic.     "  Right Ear: External ear normal.      Left Ear: External ear normal.      Nose: Nose normal.      Mouth/Throat:      Mouth: Mucous membranes are moist.   Eyes:      General: Lids are normal.      Conjunctiva/sclera: Conjunctivae normal.      Pupils: Pupils are equal, round, and reactive to light.   Neck:      Musculoskeletal: Full passive range of motion without pain and neck supple.      Thyroid: No thyromegaly.      Vascular: No JVD.      Trachea: No tracheal deviation.   Cardiovascular:      Rate and Rhythm: Normal rate and regular rhythm.      Chest Wall: PMI is not displaced.      Pulses: Normal pulses.      Heart sounds: Normal heart sounds.   Pulmonary:      Effort: Pulmonary effort is normal.      Breath sounds: Normal breath sounds.   Abdominal:      General: Bowel sounds are normal.      Palpations: Abdomen is soft.      Tenderness: There is no abdominal tenderness. There is no guarding or rebound.   Musculoskeletal: Normal range of motion.         General: No tenderness.   Skin:     General: Skin is warm and dry.      Findings: No rash.   Neurological:      General: No focal deficit present.      Mental Status: She is alert and oriented to person, place, and time.   Psychiatric:         Mood and Affect: Mood normal.         Behavior: Behavior normal.           Assessment:       1. Type 2 diabetes mellitus with hyperglycemia, with long-term current use of insulin    2. Left leg pain    3. Long term (current) use of anticoagulants    4. History of DVT (deep vein thrombosis)         Plan:       Type 2 diabetes mellitus with hyperglycemia, with long-term current use of insulin  -     Hemoglobin A1C, POCT  -     DEXCOM G6 SENSOR Dory; USE 1 SENSOR EVERY 10 DAYS  Dispense: 10 each; Refill: 5  -     insulin (BASAGLAR KWIKPEN U-100 INSULIN) glargine 100 units/mL (3mL) SubQ pen; Inject 24 Units into the skin every 12 (twelve) hours.  Dispense: 15 mL; Refill: 5    Left leg pain  -     CV Ultrasound doppler venous  DVT leg left; Future    Long term (current) use of anticoagulants    History of DVT (deep vein thrombosis)  -     CV Ultrasound doppler venous DVT leg left; Future      Follow up in about 3 months (around 3/23/2021) for Diabetic Check-Up.

## 2021-01-07 ENCOUNTER — TELEPHONE (OUTPATIENT)
Dept: FAMILY MEDICINE | Facility: CLINIC | Age: 41
End: 2021-01-07

## 2021-01-07 DIAGNOSIS — M79.605 LEFT LEG PAIN: Primary | ICD-10-CM

## 2021-01-07 DIAGNOSIS — Z86.718 HISTORY OF DVT (DEEP VEIN THROMBOSIS): ICD-10-CM

## 2021-01-15 ENCOUNTER — HOSPITAL ENCOUNTER (OUTPATIENT)
Dept: RADIOLOGY | Facility: HOSPITAL | Age: 41
Discharge: HOME | End: 2021-01-15
Attending: FAMILY MEDICINE
Payer: COMMERCIAL

## 2021-01-15 DIAGNOSIS — M79.605 LEFT LEG PAIN: ICD-10-CM

## 2021-01-15 PROCEDURE — 93971 EXTREMITY STUDY: CPT | Mod: TC,LT

## 2021-02-18 DIAGNOSIS — E11.65 TYPE 2 DIABETES MELLITUS WITH HYPERGLYCEMIA, WITH LONG-TERM CURRENT USE OF INSULIN: ICD-10-CM

## 2021-02-18 DIAGNOSIS — E11.9 NEW ONSET TYPE 2 DIABETES MELLITUS: ICD-10-CM

## 2021-02-18 DIAGNOSIS — Z79.4 TYPE 2 DIABETES MELLITUS WITH HYPERGLYCEMIA, WITH LONG-TERM CURRENT USE OF INSULIN: ICD-10-CM

## 2021-02-19 RX ORDER — INSULIN GLARGINE 100 [IU]/ML
24 INJECTION, SOLUTION SUBCUTANEOUS EVERY 12 HOURS
Qty: 15 ML | Refills: 0 | Status: SHIPPED | OUTPATIENT
Start: 2021-02-19 | End: 2021-02-19

## 2021-02-19 RX ORDER — INSULIN LISPRO 100 [IU]/ML
19 INJECTION, SOLUTION INTRAVENOUS; SUBCUTANEOUS
Qty: 18 ML | Refills: 0 | Status: SHIPPED | OUTPATIENT
Start: 2021-02-19 | End: 2021-03-23 | Stop reason: SDUPTHER

## 2021-02-21 RX ORDER — INSULIN GLARGINE 100 [IU]/ML
24 INJECTION, SOLUTION SUBCUTANEOUS 2 TIMES DAILY
Qty: 15 ML | Refills: 0
Start: 2021-02-21 | End: 2021-02-21 | Stop reason: SDUPTHER

## 2021-02-21 RX ORDER — INSULIN GLARGINE 100 [IU]/ML
24 INJECTION, SOLUTION SUBCUTANEOUS 2 TIMES DAILY
Qty: 15 ML | Refills: 3 | Status: SHIPPED | OUTPATIENT
Start: 2021-02-21 | End: 2021-06-17 | Stop reason: SDUPTHER

## 2021-03-03 ENCOUNTER — TELEPHONE (OUTPATIENT)
Dept: FAMILY MEDICINE | Facility: CLINIC | Age: 41
End: 2021-03-03

## 2021-03-17 ENCOUNTER — TELEPHONE (OUTPATIENT)
Dept: FAMILY MEDICINE | Facility: CLINIC | Age: 41
End: 2021-03-17

## 2021-03-17 DIAGNOSIS — E66.01 MORBIDLY OBESE: ICD-10-CM

## 2021-03-17 DIAGNOSIS — Z86.718 HISTORY OF DVT (DEEP VEIN THROMBOSIS): ICD-10-CM

## 2021-03-17 DIAGNOSIS — Z79.4 TYPE 2 DIABETES MELLITUS WITH HYPERGLYCEMIA, WITH LONG-TERM CURRENT USE OF INSULIN: Primary | ICD-10-CM

## 2021-03-17 DIAGNOSIS — I82.469 DEEP VEIN THROMBOSIS (DVT) OF CALF MUSCLE VEIN, UNSPECIFIED CHRONICITY, UNSPECIFIED LATERALITY: ICD-10-CM

## 2021-03-17 DIAGNOSIS — E11.65 TYPE 2 DIABETES MELLITUS WITH HYPERGLYCEMIA, WITH LONG-TERM CURRENT USE OF INSULIN: Primary | ICD-10-CM

## 2021-03-20 LAB
ALBUMIN SERPL-MCNC: 3.6 G/DL (ref 3.6–5.1)
ALBUMIN/CREAT UR: 2 MCG/MG CREAT
ALBUMIN/GLOB SERPL: 1.1 (CALC) (ref 1–2.5)
ALP SERPL-CCNC: 38 U/L (ref 31–125)
ALT SERPL-CCNC: 13 U/L (ref 6–29)
AST SERPL-CCNC: 12 U/L (ref 10–30)
BASOPHILS # BLD AUTO: 39 CELLS/UL (ref 0–200)
BASOPHILS NFR BLD AUTO: 0.7 %
BILIRUB SERPL-MCNC: 0.2 MG/DL (ref 0.2–1.2)
BUN SERPL-MCNC: 8 MG/DL (ref 7–25)
BUN/CREAT SERPL: ABNORMAL (CALC) (ref 6–22)
CALCIUM SERPL-MCNC: 9.1 MG/DL (ref 8.6–10.2)
CHLORIDE SERPL-SCNC: 101 MMOL/L (ref 98–110)
CHOLEST SERPL-MCNC: 155 MG/DL
CHOLEST/HDLC SERPL: 3.2 (CALC)
CO2 SERPL-SCNC: 32 MMOL/L (ref 20–32)
CREAT SERPL-MCNC: 0.78 MG/DL (ref 0.5–1.1)
CREAT UR-MCNC: 212 MG/DL (ref 20–275)
EOSINOPHIL # BLD AUTO: 90 CELLS/UL (ref 15–500)
EOSINOPHIL NFR BLD AUTO: 1.6 %
ERYTHROCYTE [DISTWIDTH] IN BLOOD BY AUTOMATED COUNT: 13 % (ref 11–15)
GFRSERPLBLD MDRD-ARVRAT: 96 ML/MIN/1.73M2
GLOBULIN SER CALC-MCNC: 3.3 G/DL (CALC) (ref 1.9–3.7)
GLUCOSE SERPL-MCNC: 204 MG/DL (ref 65–99)
HBA1C MFR BLD: 9 % OF TOTAL HGB
HCT VFR BLD AUTO: 39.7 % (ref 35–45)
HDLC SERPL-MCNC: 48 MG/DL
HGB BLD-MCNC: 12.4 G/DL (ref 11.7–15.5)
LDLC SERPL CALC-MCNC: 84 MG/DL (CALC)
LYMPHOCYTES # BLD AUTO: 2173 CELLS/UL (ref 850–3900)
LYMPHOCYTES NFR BLD AUTO: 38.8 %
MCH RBC QN AUTO: 26.2 PG (ref 27–33)
MCHC RBC AUTO-ENTMCNC: 31.2 G/DL (ref 32–36)
MCV RBC AUTO: 83.8 FL (ref 80–100)
MICROALBUMIN UR-MCNC: 0.5 MG/DL
MONOCYTES # BLD AUTO: 510 CELLS/UL (ref 200–950)
MONOCYTES NFR BLD AUTO: 9.1 %
NEUTROPHILS # BLD AUTO: 2789 CELLS/UL (ref 1500–7800)
NEUTROPHILS NFR BLD AUTO: 49.8 %
NONHDLC SERPL-MCNC: 107 MG/DL (CALC)
PLATELET # BLD AUTO: 288 THOUSAND/UL (ref 140–400)
PMV BLD REES-ECKER: 10.7 FL (ref 7.5–12.5)
POTASSIUM SERPL-SCNC: 4.4 MMOL/L (ref 3.5–5.3)
PROT SERPL-MCNC: 6.9 G/DL (ref 6.1–8.1)
RBC # BLD AUTO: 4.74 MILLION/UL (ref 3.8–5.1)
SODIUM SERPL-SCNC: 136 MMOL/L (ref 135–146)
TRIGL SERPL-MCNC: 133 MG/DL
TSH SERPL-ACNC: 0.96 MIU/L
WBC # BLD AUTO: 5.6 THOUSAND/UL (ref 3.8–10.8)

## 2021-03-22 ENCOUNTER — TELEPHONE (OUTPATIENT)
Dept: FAMILY MEDICINE | Facility: CLINIC | Age: 41
End: 2021-03-22

## 2021-03-23 ENCOUNTER — OFFICE VISIT (OUTPATIENT)
Dept: FAMILY MEDICINE | Facility: CLINIC | Age: 41
End: 2021-03-23
Payer: COMMERCIAL

## 2021-03-23 VITALS
BODY MASS INDEX: 42.28 KG/M2 | HEIGHT: 68 IN | SYSTOLIC BLOOD PRESSURE: 102 MMHG | DIASTOLIC BLOOD PRESSURE: 54 MMHG | WEIGHT: 279 LBS | HEART RATE: 65 BPM

## 2021-03-23 DIAGNOSIS — Z79.01 LONG TERM (CURRENT) USE OF ANTICOAGULANTS: ICD-10-CM

## 2021-03-23 DIAGNOSIS — Z79.4 TYPE 2 DIABETES MELLITUS WITH HYPERGLYCEMIA, WITH LONG-TERM CURRENT USE OF INSULIN: Primary | ICD-10-CM

## 2021-03-23 DIAGNOSIS — I82.411 DVT FEMORAL (DEEP VENOUS THROMBOSIS) WITH THROMBOPHLEBITIS, RIGHT: ICD-10-CM

## 2021-03-23 DIAGNOSIS — E11.65 TYPE 2 DIABETES MELLITUS WITH HYPERGLYCEMIA, WITH LONG-TERM CURRENT USE OF INSULIN: Primary | ICD-10-CM

## 2021-03-23 DIAGNOSIS — E11.9 ENCOUNTER FOR DIABETIC FOOT EXAM: ICD-10-CM

## 2021-03-23 PROCEDURE — 3052F HG A1C>EQUAL 8.0%<EQUAL 9.0%: CPT | Mod: S$GLB,,, | Performed by: FAMILY MEDICINE

## 2021-03-23 PROCEDURE — 99214 OFFICE O/P EST MOD 30 MIN: CPT | Mod: S$GLB,,, | Performed by: FAMILY MEDICINE

## 2021-03-23 PROCEDURE — 3008F PR BODY MASS INDEX (BMI) DOCUMENTED: ICD-10-PCS | Mod: S$GLB,,, | Performed by: FAMILY MEDICINE

## 2021-03-23 PROCEDURE — 2028F FOOT EXAM PERFORMED: CPT | Mod: S$GLB,,, | Performed by: FAMILY MEDICINE

## 2021-03-23 PROCEDURE — 3008F BODY MASS INDEX DOCD: CPT | Mod: S$GLB,,, | Performed by: FAMILY MEDICINE

## 2021-03-23 PROCEDURE — 99214 PR OFFICE/OUTPT VISIT, EST, LEVL IV, 30-39 MIN: ICD-10-PCS | Mod: S$GLB,,, | Performed by: FAMILY MEDICINE

## 2021-03-23 PROCEDURE — 2028F PR FOOT EXAM PERFORMED: ICD-10-PCS | Mod: S$GLB,,, | Performed by: FAMILY MEDICINE

## 2021-03-23 PROCEDURE — 3052F PR MOST RECENT HEMOGLOBIN A1C LEVEL 8.0 - < 9.0%: ICD-10-PCS | Mod: S$GLB,,, | Performed by: FAMILY MEDICINE

## 2021-03-23 RX ORDER — LISINOPRIL 2.5 MG/1
2.5 TABLET ORAL DAILY
Qty: 30 TABLET | Refills: 12 | Status: SHIPPED | OUTPATIENT
Start: 2021-03-23

## 2021-03-23 RX ORDER — SIMVASTATIN 10 MG/1
10 TABLET, FILM COATED ORAL NIGHTLY
Qty: 30 TABLET | Refills: 11 | Status: SHIPPED | OUTPATIENT
Start: 2021-03-23

## 2021-03-23 RX ORDER — METFORMIN HYDROCHLORIDE 750 MG/1
750 TABLET, EXTENDED RELEASE ORAL
Qty: 30 TABLET | Refills: 11 | Status: SHIPPED | OUTPATIENT
Start: 2021-03-23 | End: 2022-03-23

## 2021-03-23 RX ORDER — APIXABAN 5 MG/1
5 TABLET, FILM COATED ORAL 2 TIMES DAILY
Qty: 60 TABLET | Refills: 12 | Status: SHIPPED | OUTPATIENT
Start: 2021-03-23

## 2021-03-23 RX ORDER — INSULIN LISPRO 100 [IU]/ML
19 INJECTION, SOLUTION INTRAVENOUS; SUBCUTANEOUS
Qty: 18 ML | Refills: 5 | Status: SHIPPED | OUTPATIENT
Start: 2021-03-23 | End: 2021-06-17 | Stop reason: SDUPTHER

## 2021-03-23 RX ORDER — INSULIN PUMP SYRINGE, 3 ML
EACH MISCELLANEOUS
Qty: 1 EACH | Refills: 0 | Status: SHIPPED | OUTPATIENT
Start: 2021-03-23 | End: 2022-06-15

## 2021-04-12 ENCOUNTER — PATIENT MESSAGE (OUTPATIENT)
Dept: RESEARCH | Facility: HOSPITAL | Age: 41
End: 2021-04-12

## 2021-06-10 ENCOUNTER — TELEPHONE (OUTPATIENT)
Dept: FAMILY MEDICINE | Facility: CLINIC | Age: 41
End: 2021-06-10

## 2021-06-11 ENCOUNTER — TELEPHONE (OUTPATIENT)
Dept: FAMILY MEDICINE | Facility: CLINIC | Age: 41
End: 2021-06-11

## 2021-06-15 LAB — HBA1C MFR BLD: 8.4 % OF TOTAL HGB

## 2021-06-17 ENCOUNTER — OFFICE VISIT (OUTPATIENT)
Dept: FAMILY MEDICINE | Facility: CLINIC | Age: 41
End: 2021-06-17
Payer: COMMERCIAL

## 2021-06-17 VITALS
HEART RATE: 83 BPM | DIASTOLIC BLOOD PRESSURE: 80 MMHG | SYSTOLIC BLOOD PRESSURE: 110 MMHG | BODY MASS INDEX: 42.59 KG/M2 | WEIGHT: 281 LBS | HEIGHT: 68 IN

## 2021-06-17 DIAGNOSIS — Z79.01 LONG TERM (CURRENT) USE OF ANTICOAGULANTS: ICD-10-CM

## 2021-06-17 DIAGNOSIS — E11.65 TYPE 2 DIABETES MELLITUS WITH HYPERGLYCEMIA, WITH LONG-TERM CURRENT USE OF INSULIN: Primary | ICD-10-CM

## 2021-06-17 DIAGNOSIS — Z79.4 TYPE 2 DIABETES MELLITUS WITH HYPERGLYCEMIA, WITH LONG-TERM CURRENT USE OF INSULIN: Primary | ICD-10-CM

## 2021-06-17 DIAGNOSIS — Z86.718 HISTORY OF DVT (DEEP VEIN THROMBOSIS): ICD-10-CM

## 2021-06-17 DIAGNOSIS — G47.33 OSA (OBSTRUCTIVE SLEEP APNEA): ICD-10-CM

## 2021-06-17 PROCEDURE — 3052F PR MOST RECENT HEMOGLOBIN A1C LEVEL 8.0 - < 9.0%: ICD-10-PCS | Mod: S$GLB,,, | Performed by: FAMILY MEDICINE

## 2021-06-17 PROCEDURE — 3074F PR MOST RECENT SYSTOLIC BLOOD PRESSURE < 130 MM HG: ICD-10-PCS | Mod: S$GLB,,, | Performed by: FAMILY MEDICINE

## 2021-06-17 PROCEDURE — 3079F PR MOST RECENT DIASTOLIC BLOOD PRESSURE 80-89 MM HG: ICD-10-PCS | Mod: S$GLB,,, | Performed by: FAMILY MEDICINE

## 2021-06-17 PROCEDURE — 1160F RVW MEDS BY RX/DR IN RCRD: CPT | Mod: S$GLB,,, | Performed by: FAMILY MEDICINE

## 2021-06-17 PROCEDURE — 3079F DIAST BP 80-89 MM HG: CPT | Mod: S$GLB,,, | Performed by: FAMILY MEDICINE

## 2021-06-17 PROCEDURE — 3008F BODY MASS INDEX DOCD: CPT | Mod: S$GLB,,, | Performed by: FAMILY MEDICINE

## 2021-06-17 PROCEDURE — 99214 OFFICE O/P EST MOD 30 MIN: CPT | Mod: S$GLB,,, | Performed by: FAMILY MEDICINE

## 2021-06-17 PROCEDURE — 99214 PR OFFICE/OUTPT VISIT, EST, LEVL IV, 30-39 MIN: ICD-10-PCS | Mod: S$GLB,,, | Performed by: FAMILY MEDICINE

## 2021-06-17 PROCEDURE — 1160F PR REVIEW ALL MEDS BY PRESCRIBER/CLIN PHARMACIST DOCUMENTED: ICD-10-PCS | Mod: S$GLB,,, | Performed by: FAMILY MEDICINE

## 2021-06-17 PROCEDURE — 3008F PR BODY MASS INDEX (BMI) DOCUMENTED: ICD-10-PCS | Mod: S$GLB,,, | Performed by: FAMILY MEDICINE

## 2021-06-17 PROCEDURE — 3052F HG A1C>EQUAL 8.0%<EQUAL 9.0%: CPT | Mod: S$GLB,,, | Performed by: FAMILY MEDICINE

## 2021-06-17 PROCEDURE — 3074F SYST BP LT 130 MM HG: CPT | Mod: S$GLB,,, | Performed by: FAMILY MEDICINE

## 2021-06-17 RX ORDER — INSULIN LISPRO 100 [IU]/ML
19 INJECTION, SOLUTION INTRAVENOUS; SUBCUTANEOUS
Qty: 18 ML | Refills: 5 | Status: SHIPPED | OUTPATIENT
Start: 2021-06-17

## 2021-06-17 RX ORDER — INSULIN GLARGINE 100 [IU]/ML
46 INJECTION, SOLUTION SUBCUTANEOUS DAILY
Qty: 15 ML | Refills: 5 | Status: SHIPPED | OUTPATIENT
Start: 2021-06-17

## 2021-06-17 RX ORDER — DULAGLUTIDE 1.5 MG/.5ML
1.5 INJECTION, SOLUTION SUBCUTANEOUS
Qty: 4 PEN | Refills: 11 | Status: SHIPPED | OUTPATIENT
Start: 2021-06-17 | End: 2022-06-17

## 2021-07-14 ENCOUNTER — TELEPHONE (OUTPATIENT)
Dept: HEMATOLOGY/ONCOLOGY | Facility: CLINIC | Age: 41
End: 2021-07-14

## 2022-07-25 NOTE — LETTER
August 15, 2019     [facility]  ATTN: Appeals  [address], [address]  [place] [address]  [phone]               [facility]  [address], [facility] [address]  [place] [address]  Phone: [phone]  Fax: [phone] August 15, 2019     Patient: Jose Hi    YOB: 1980   Date of Visit: 6/11/2019       To Whom It May Concern:    It is my medical opinion that Jose Hi  at the time of my evaluation on 11 June 2019 was not able to return to work.  She is due for follow up in my clinic on 10 September 2019.  At this time we will evaluate her progress and discuss potential discontinuation of her anticoagulation.  She is scheduled to have a CT scan prior to this visit.   If you have any questions or concerns, please don't hesitate to call.    Sincerely,        Yael Galan, DNP      Valtrex Pregnancy And Lactation Text: this medication is Pregnancy Category B and is considered safe during pregnancy. This medication is not directly found in breast milk but it's metabolite acyclovir is present.

## 2022-10-26 ENCOUNTER — TELEPHONE (OUTPATIENT)
Dept: FAMILY MEDICINE | Facility: CLINIC | Age: 42
End: 2022-10-26

## 2023-03-15 DIAGNOSIS — Z12.31 OTHER SCREENING MAMMOGRAM: ICD-10-CM

## 2023-04-11 ENCOUNTER — PATIENT MESSAGE (OUTPATIENT)
Dept: ADMINISTRATIVE | Facility: HOSPITAL | Age: 43
End: 2023-04-11
Payer: COMMERCIAL

## 2025-07-03 NOTE — SUBJECTIVE & OBJECTIVE
"Interval HPI:   Overnight events:    NAEON. BG remains above goal. Will adjust SQ insulin regimen accordingly. Patient is scheduled for discharge tomorrow. Will most likely need insulin regimen adjustments tomorrow morning.      Reviewed topics related to DM including: the need for insulin, how insulin works, what makes it a high risk medication, the importance of immediate follow up with either PCP or endocrine provider, importance of and how to check BG, how to record BG on logs, how to administer insulin, appropriate insulin administration sites, importance of rotating injection sites, hyper/hypoglycemia, how and when to treat hypoglycemia, when to hold insulin, how the correction scale works, importance of storing unused insulin in the refrigerator, and when to seek medical attention.  Patient verbalized understanding, answered all questions to patient's satisfaction.      Eatin%  Nausea: No  Hypoglycemia and intervention: No  Fever: No  TPN and/or TF: No  If yes, type of TF/TPN and rate: None    /72 (BP Location: Left arm, Patient Position: Lying)   Pulse 86   Temp 98.9 °F (37.2 °C) (Oral)   Resp (!) 24   Ht 5' 9" (1.753 m)   Wt 125.2 kg (276 lb)   LMP 03/15/2019   SpO2 97%   Breastfeeding? No   BMI 40.76 kg/m²     Labs Reviewed and Include    Lab Results   Component Value Date    ALT 18 2019    AST 14 2019    ALKPHOS 68 2019    BILITOT 0.2 2019       Recent Labs   Lab 19  2324   *   CALCIUM 8.9   *   K 4.0   CO2 24      BUN 7   CREATININE 0.9     Lab Results   Component Value Date    WBC 5.26 2019    HGB 13.1 2019    HCT 42.3 2019    MCV 86 2019     2019     No results for input(s): TSH, FREET4 in the last 168 hours.  Lab Results   Component Value Date    HGBA1C 13.4 (H) 2019       Nutritional status:   Body mass index is 40.76 kg/m².  Lab Results   Component Value Date    ALBUMIN 3.5 2019 "     No results found for: PREALBUMIN    Estimated Creatinine Clearance: 121.3 mL/min (based on SCr of 0.9 mg/dL).    Accu-Checks  Recent Labs     03/22/19  2245 03/23/19  0107 03/23/19  0800 03/23/19  1150 03/23/19  1714 03/23/19  2201 03/24/19  0826   POCTGLUCOSE 310* 321* 329* 261* 275* 306* 311*       Current Medications and/or Treatments Impacting Glycemic Control  Immunotherapy:    Immunosuppressants     None        Steroids:   Hormones (From admission, onward)    None        Pressors:    Autonomic Drugs (From admission, onward)    None        Hyperglycemia/Diabetes Medications:   Antihyperglycemics (From admission, onward)    Start     Stop Route Frequency Ordered    03/23/19 1130  insulin aspart U-100 pen 5 Units      -- SubQ 3 times daily with meals 03/23/19 0833    03/23/19 0945  insulin detemir U-100 pen 15 Units      -- SubQ Daily 03/23/19 0833    03/23/19 0933  insulin aspart U-100 pen 0-5 Units      -- SubQ Before meals & nightly PRN 03/23/19 0833         warm and dry/color normal/normal/no rashes/no ulcers

## (undated) DEVICE — SHEATH INTRODUCER 6FR 11CM

## (undated) DEVICE — SPIKE CONTRAST CONTROLLER

## (undated) DEVICE — GUIDEWIRE AMPLATZ .035X260

## (undated) DEVICE — KIT CUSTOM MANIFOLD

## (undated) DEVICE — STOPCOCK 3-WAY

## (undated) DEVICE — CATH WEDGE 6FR X 110CM

## (undated) DEVICE — SEE MEDLINE ITEM 156894

## (undated) DEVICE — KIT PROBE COVER WITH GEL

## (undated) DEVICE — OMNIPAQUE 350 200ML

## (undated) DEVICE — COVER BAND BAG 40 X 40

## (undated) DEVICE — GUIDEWIRE EMERALD 150CM PTFE

## (undated) DEVICE — CATH EKOSONIC 5.4FR 12X106CM